# Patient Record
Sex: FEMALE | Race: WHITE | NOT HISPANIC OR LATINO | Employment: OTHER | URBAN - NONMETROPOLITAN AREA
[De-identification: names, ages, dates, MRNs, and addresses within clinical notes are randomized per-mention and may not be internally consistent; named-entity substitution may affect disease eponyms.]

---

## 2017-01-26 ENCOUNTER — APPOINTMENT (OUTPATIENT)
Dept: PREADMISSION TESTING | Facility: HOSPITAL | Age: 77
End: 2017-01-26

## 2017-01-26 VITALS
RESPIRATION RATE: 20 BRPM | HEIGHT: 66 IN | HEART RATE: 64 BPM | TEMPERATURE: 96.8 F | OXYGEN SATURATION: 97 % | WEIGHT: 158.38 LBS | SYSTOLIC BLOOD PRESSURE: 176 MMHG | BODY MASS INDEX: 25.45 KG/M2 | DIASTOLIC BLOOD PRESSURE: 81 MMHG

## 2017-01-26 LAB
ANION GAP SERPL CALCULATED.3IONS-SCNC: 10 MMOL/L (ref 4–13)
BUN BLD-MCNC: 35 MG/DL (ref 5–21)
BUN/CREAT SERPL: 23.6 (ref 7–25)
CALCIUM SPEC-SCNC: 8.9 MG/DL (ref 8.4–10.4)
CHLORIDE SERPL-SCNC: 105 MMOL/L (ref 98–110)
CO2 SERPL-SCNC: 31 MMOL/L (ref 24–31)
CREAT BLD-MCNC: 1.48 MG/DL (ref 0.5–1.4)
DEPRECATED RDW RBC AUTO: 43.5 FL (ref 40–54)
ERYTHROCYTE [DISTWIDTH] IN BLOOD BY AUTOMATED COUNT: 13.2 % (ref 12–15)
GFR SERPL CREATININE-BSD FRML MDRD: 34 ML/MIN/1.73
GLUCOSE BLD-MCNC: 97 MG/DL (ref 70–100)
HCT VFR BLD AUTO: 34.9 % (ref 37–47)
HGB BLD-MCNC: 11.5 G/DL (ref 12–16)
MCH RBC QN AUTO: 29.3 PG (ref 28–32)
MCHC RBC AUTO-ENTMCNC: 33 G/DL (ref 33–36)
MCV RBC AUTO: 88.8 FL (ref 82–98)
PLATELET # BLD AUTO: 164 10*3/MM3 (ref 130–400)
PMV BLD AUTO: 12.3 FL (ref 6–12)
POTASSIUM BLD-SCNC: 4 MMOL/L (ref 3.5–5.3)
RBC # BLD AUTO: 3.93 10*6/MM3 (ref 4.2–5.4)
SODIUM BLD-SCNC: 146 MMOL/L (ref 135–145)
WBC NRBC COR # BLD: 5.27 10*3/MM3 (ref 4.8–10.8)

## 2017-01-26 PROCEDURE — 36415 COLL VENOUS BLD VENIPUNCTURE: CPT

## 2017-01-26 PROCEDURE — 93010 ELECTROCARDIOGRAM REPORT: CPT | Performed by: INTERNAL MEDICINE

## 2017-01-26 PROCEDURE — 85027 COMPLETE CBC AUTOMATED: CPT

## 2017-01-26 PROCEDURE — 93005 ELECTROCARDIOGRAM TRACING: CPT

## 2017-01-26 PROCEDURE — 80048 BASIC METABOLIC PNL TOTAL CA: CPT

## 2017-01-26 RX ORDER — FEBUXOSTAT 40 MG/1
TABLET, FILM COATED ORAL
COMMUNITY
Start: 2016-11-22

## 2017-01-26 RX ORDER — LOSARTAN POTASSIUM 100 MG/1
TABLET ORAL
COMMUNITY
Start: 2016-09-14

## 2017-01-26 RX ORDER — DOXAZOSIN MESYLATE 1 MG/1
TABLET ORAL
COMMUNITY

## 2017-01-26 RX ORDER — AMLODIPINE BESYLATE 5 MG/1
TABLET ORAL
COMMUNITY
Start: 2016-06-06

## 2017-01-26 RX ORDER — ASPIRIN 81 MG/1
TABLET, CHEWABLE ORAL
COMMUNITY
Start: 2016-08-08

## 2017-01-26 RX ORDER — CALCITRIOL 0.25 UG/1
CAPSULE, LIQUID FILLED ORAL
COMMUNITY

## 2017-01-26 RX ORDER — METOPROLOL TARTRATE 100 MG/1
TABLET ORAL
COMMUNITY
Start: 2016-10-31

## 2017-01-26 RX ORDER — ESOMEPRAZOLE MAGNESIUM 40 MG/1
CAPSULE, DELAYED RELEASE ORAL
COMMUNITY
Start: 2016-09-14

## 2017-01-26 RX ORDER — ANTIARTHRITIC COMBINATION NO.2 900 MG
TABLET ORAL
COMMUNITY

## 2017-01-26 RX ORDER — LEVOTHYROXINE SODIUM 0.05 MG/1
TABLET ORAL
COMMUNITY

## 2017-01-26 RX ORDER — DORZOLAMIDE HYDROCHLORIDE AND TIMOLOL MALEATE 20; 5 MG/ML; MG/ML
SOLUTION/ DROPS OPHTHALMIC
COMMUNITY

## 2017-01-26 RX ORDER — ATORVASTATIN CALCIUM 20 MG/1
TABLET, FILM COATED ORAL
COMMUNITY
Start: 2016-11-14

## 2017-02-02 ENCOUNTER — HOSPITAL ENCOUNTER (OUTPATIENT)
Facility: HOSPITAL | Age: 77
Setting detail: HOSPITAL OUTPATIENT SURGERY
Discharge: HOME | End: 2017-02-02

## 2017-02-02 ENCOUNTER — ANESTHESIA (OUTPATIENT)
Dept: PERIOP | Facility: HOSPITAL | Age: 77
End: 2017-02-02

## 2017-02-02 ENCOUNTER — ANESTHESIA EVENT (OUTPATIENT)
Dept: PERIOP | Facility: HOSPITAL | Age: 77
End: 2017-02-02

## 2017-02-02 VITALS
TEMPERATURE: 98 F | HEART RATE: 71 BPM | SYSTOLIC BLOOD PRESSURE: 185 MMHG | RESPIRATION RATE: 18 BRPM | OXYGEN SATURATION: 97 % | DIASTOLIC BLOOD PRESSURE: 66 MMHG

## 2017-02-02 PROCEDURE — 25010000002 DEXAMETHASONE PER 1 MG

## 2017-02-02 PROCEDURE — 25010000002 FENTANYL CITRATE (PF) 100 MCG/2ML SOLUTION: Performed by: NURSE ANESTHETIST, CERTIFIED REGISTERED

## 2017-02-02 PROCEDURE — 25010000002 PROPOFOL 10 MG/ML EMULSION: Performed by: NURSE ANESTHETIST, CERTIFIED REGISTERED

## 2017-02-02 PROCEDURE — 25010000003 CEFAZOLIN PER 500 MG: Performed by: NURSE ANESTHETIST, CERTIFIED REGISTERED

## 2017-02-02 PROCEDURE — L8612 AQUEOUS SHUNT PROSTHESIS: HCPCS

## 2017-02-02 PROCEDURE — 25010000002 SUCCINYLCHOLINE PER 20 MG: Performed by: NURSE ANESTHETIST, CERTIFIED REGISTERED

## 2017-02-02 PROCEDURE — 25010000002 MIDAZOLAM PER 1 MG: Performed by: ANESTHESIOLOGY

## 2017-02-02 PROCEDURE — 25010000002 ONDANSETRON PER 1 MG: Performed by: NURSE ANESTHETIST, CERTIFIED REGISTERED

## 2017-02-02 PROCEDURE — 25010000002 ONDANSETRON PER 1 MG: Performed by: ANESTHESIOLOGY

## 2017-02-02 PROCEDURE — 25010000003 CEFAZOLIN PER 500 MG

## 2017-02-02 DEVICE — IMPLANTABLE DEVICE: Type: IMPLANTABLE DEVICE | Site: ANTERIOR CHAMBER | Status: FUNCTIONAL

## 2017-02-02 RX ORDER — SODIUM CHLORIDE, SODIUM LACTATE, POTASSIUM CHLORIDE, CALCIUM CHLORIDE 600; 310; 30; 20 MG/100ML; MG/100ML; MG/100ML; MG/100ML
100 INJECTION, SOLUTION INTRAVENOUS CONTINUOUS
Status: DISCONTINUED | OUTPATIENT
Start: 2017-02-02 | End: 2017-02-02 | Stop reason: HOSPADM

## 2017-02-02 RX ORDER — FENTANYL CITRATE 50 UG/ML
25 INJECTION, SOLUTION INTRAMUSCULAR; INTRAVENOUS AS NEEDED
Status: DISCONTINUED | OUTPATIENT
Start: 2017-02-02 | End: 2017-02-02 | Stop reason: HOSPADM

## 2017-02-02 RX ORDER — LABETALOL HYDROCHLORIDE 5 MG/ML
5 INJECTION, SOLUTION INTRAVENOUS
Status: DISCONTINUED | OUTPATIENT
Start: 2017-02-02 | End: 2017-02-02 | Stop reason: HOSPADM

## 2017-02-02 RX ORDER — SUCCINYLCHOLINE CHLORIDE 20 MG/ML
INJECTION INTRAMUSCULAR; INTRAVENOUS AS NEEDED
Status: DISCONTINUED | OUTPATIENT
Start: 2017-02-02 | End: 2017-02-02 | Stop reason: SURG

## 2017-02-02 RX ORDER — LIDOCAINE HYDROCHLORIDE 20 MG/ML
INJECTION, SOLUTION INFILTRATION; PERINEURAL AS NEEDED
Status: DISCONTINUED | OUTPATIENT
Start: 2017-02-02 | End: 2017-02-02 | Stop reason: SURG

## 2017-02-02 RX ORDER — MORPHINE SULFATE 2 MG/ML
2 INJECTION, SOLUTION INTRAMUSCULAR; INTRAVENOUS AS NEEDED
Status: DISCONTINUED | OUTPATIENT
Start: 2017-02-02 | End: 2017-02-02 | Stop reason: HOSPADM

## 2017-02-02 RX ORDER — HYDRALAZINE HYDROCHLORIDE 20 MG/ML
5 INJECTION INTRAMUSCULAR; INTRAVENOUS
Status: DISCONTINUED | OUTPATIENT
Start: 2017-02-02 | End: 2017-02-02 | Stop reason: HOSPADM

## 2017-02-02 RX ORDER — ROCURONIUM BROMIDE 10 MG/ML
INJECTION, SOLUTION INTRAVENOUS AS NEEDED
Status: DISCONTINUED | OUTPATIENT
Start: 2017-02-02 | End: 2017-02-02 | Stop reason: SURG

## 2017-02-02 RX ORDER — MIDAZOLAM HYDROCHLORIDE 1 MG/ML
2 INJECTION INTRAMUSCULAR; INTRAVENOUS
Status: DISCONTINUED | OUTPATIENT
Start: 2017-02-02 | End: 2017-02-02 | Stop reason: HOSPADM

## 2017-02-02 RX ORDER — SODIUM CHLORIDE 0.9 % (FLUSH) 0.9 %
1-10 SYRINGE (ML) INJECTION AS NEEDED
Status: DISCONTINUED | OUTPATIENT
Start: 2017-02-02 | End: 2017-02-02 | Stop reason: HOSPADM

## 2017-02-02 RX ORDER — MIDAZOLAM HYDROCHLORIDE 1 MG/ML
1 INJECTION INTRAMUSCULAR; INTRAVENOUS
Status: DISCONTINUED | OUTPATIENT
Start: 2017-02-02 | End: 2017-02-02 | Stop reason: HOSPADM

## 2017-02-02 RX ORDER — BUPIVACAINE HYDROCHLORIDE 7.5 MG/ML
INJECTION, SOLUTION EPIDURAL; RETROBULBAR AS NEEDED
Status: DISCONTINUED | OUTPATIENT
Start: 2017-02-02 | End: 2017-02-02 | Stop reason: HOSPADM

## 2017-02-02 RX ORDER — ONDANSETRON 2 MG/ML
INJECTION INTRAMUSCULAR; INTRAVENOUS AS NEEDED
Status: DISCONTINUED | OUTPATIENT
Start: 2017-02-02 | End: 2017-02-02 | Stop reason: SURG

## 2017-02-02 RX ORDER — FLUMAZENIL 0.1 MG/ML
0.2 INJECTION INTRAVENOUS AS NEEDED
Status: DISCONTINUED | OUTPATIENT
Start: 2017-02-02 | End: 2017-02-02 | Stop reason: HOSPADM

## 2017-02-02 RX ORDER — PILOCARPINE HYDROCHLORIDE 10 MG/ML
1 SOLUTION/ DROPS OPHTHALMIC
Status: COMPLETED | OUTPATIENT
Start: 2017-02-02 | End: 2017-02-02

## 2017-02-02 RX ORDER — IPRATROPIUM BROMIDE AND ALBUTEROL SULFATE 2.5; .5 MG/3ML; MG/3ML
3 SOLUTION RESPIRATORY (INHALATION) ONCE AS NEEDED
Status: DISCONTINUED | OUTPATIENT
Start: 2017-02-02 | End: 2017-02-02 | Stop reason: HOSPADM

## 2017-02-02 RX ORDER — OXYCODONE HYDROCHLORIDE AND ACETAMINOPHEN 5; 325 MG/1; MG/1
1 TABLET ORAL EVERY 4 HOURS PRN
Status: DISCONTINUED | OUTPATIENT
Start: 2017-02-02 | End: 2017-02-02 | Stop reason: HOSPADM

## 2017-02-02 RX ORDER — PILOCARPINE HYDROCHLORIDE 20 MG/ML
SOLUTION/ DROPS OPHTHALMIC AS NEEDED
Status: DISCONTINUED | OUTPATIENT
Start: 2017-02-02 | End: 2017-02-02 | Stop reason: HOSPADM

## 2017-02-02 RX ORDER — CEFAZOLIN SODIUM 1 G/3ML
INJECTION, POWDER, FOR SOLUTION INTRAMUSCULAR; INTRAVENOUS AS NEEDED
Status: DISCONTINUED | OUTPATIENT
Start: 2017-02-02 | End: 2017-02-02 | Stop reason: SURG

## 2017-02-02 RX ORDER — ONDANSETRON 2 MG/ML
4 INJECTION INTRAMUSCULAR; INTRAVENOUS AS NEEDED
Status: DISCONTINUED | OUTPATIENT
Start: 2017-02-02 | End: 2017-02-02 | Stop reason: HOSPADM

## 2017-02-02 RX ORDER — CELECOXIB 200 MG/1
200 CAPSULE ORAL ONCE AS NEEDED
Status: COMPLETED | OUTPATIENT
Start: 2017-02-02 | End: 2017-02-02

## 2017-02-02 RX ORDER — PROPOFOL 10 MG/ML
VIAL (ML) INTRAVENOUS AS NEEDED
Status: DISCONTINUED | OUTPATIENT
Start: 2017-02-02 | End: 2017-02-02 | Stop reason: SURG

## 2017-02-02 RX ORDER — DEXAMETHASONE SODIUM PHOSPHATE 4 MG/ML
INJECTION, SOLUTION INTRA-ARTICULAR; INTRALESIONAL; INTRAMUSCULAR; INTRAVENOUS; SOFT TISSUE AS NEEDED
Status: DISCONTINUED | OUTPATIENT
Start: 2017-02-02 | End: 2017-02-02 | Stop reason: HOSPADM

## 2017-02-02 RX ORDER — NALOXONE HCL 0.4 MG/ML
0.04 VIAL (ML) INJECTION AS NEEDED
Status: DISCONTINUED | OUTPATIENT
Start: 2017-02-02 | End: 2017-02-02 | Stop reason: HOSPADM

## 2017-02-02 RX ORDER — FENTANYL CITRATE 50 UG/ML
INJECTION, SOLUTION INTRAMUSCULAR; INTRAVENOUS AS NEEDED
Status: DISCONTINUED | OUTPATIENT
Start: 2017-02-02 | End: 2017-02-02 | Stop reason: SURG

## 2017-02-02 RX ORDER — MEPERIDINE HYDROCHLORIDE 25 MG/ML
12.5 INJECTION INTRAMUSCULAR; INTRAVENOUS; SUBCUTANEOUS
Status: DISCONTINUED | OUTPATIENT
Start: 2017-02-02 | End: 2017-02-02 | Stop reason: HOSPADM

## 2017-02-02 RX ORDER — PILOCARPINE HYDROCHLORIDE 10 MG/ML
1 SOLUTION/ DROPS OPHTHALMIC
Status: DISCONTINUED | OUTPATIENT
Start: 2017-02-02 | End: 2017-02-02 | Stop reason: HOSPADM

## 2017-02-02 RX ADMIN — ROCURONIUM BROMIDE 5 MG: 10 INJECTION INTRAVENOUS at 08:20

## 2017-02-02 RX ADMIN — CELECOXIB 200 MG: 200 CAPSULE ORAL at 12:02

## 2017-02-02 RX ADMIN — EPHEDRINE SULFATE 10 MG: 50 INJECTION INTRAMUSCULAR; INTRAVENOUS; SUBCUTANEOUS at 08:25

## 2017-02-02 RX ADMIN — PILOCARPINE HYDROCHLORIDE 1 DROP: 10 SOLUTION/ DROPS OPHTHALMIC at 07:32

## 2017-02-02 RX ADMIN — LIDOCAINE HYDROCHLORIDE 0.5 ML: 10 INJECTION, SOLUTION EPIDURAL; INFILTRATION; INTRACAUDAL; PERINEURAL at 07:13

## 2017-02-02 RX ADMIN — LIDOCAINE HYDROCHLORIDE 60 MG: 20 INJECTION, SOLUTION INFILTRATION; PERINEURAL at 08:20

## 2017-02-02 RX ADMIN — SODIUM CHLORIDE, POTASSIUM CHLORIDE, SODIUM LACTATE AND CALCIUM CHLORIDE 100 ML/HR: 600; 310; 30; 20 INJECTION, SOLUTION INTRAVENOUS at 07:13

## 2017-02-02 RX ADMIN — CEFAZOLIN 1 G: 1 INJECTION, POWDER, FOR SOLUTION INTRAVENOUS at 08:18

## 2017-02-02 RX ADMIN — ONDANSETRON HYDROCHLORIDE 4 MG: 2 SOLUTION INTRAMUSCULAR; INTRAVENOUS at 11:55

## 2017-02-02 RX ADMIN — SODIUM CHLORIDE, POTASSIUM CHLORIDE, SODIUM LACTATE AND CALCIUM CHLORIDE: 600; 310; 30; 20 INJECTION, SOLUTION INTRAVENOUS at 09:25

## 2017-02-02 RX ADMIN — SUCCINYLCHOLINE CHLORIDE 80 MG: 20 INJECTION, SOLUTION INTRAMUSCULAR; INTRAVENOUS at 08:20

## 2017-02-02 RX ADMIN — PILOCARPINE HYDROCHLORIDE 1 DROP: 10 SOLUTION/ DROPS OPHTHALMIC at 07:22

## 2017-02-02 RX ADMIN — PILOCARPINE HYDROCHLORIDE 1 DROP: 10 SOLUTION/ DROPS OPHTHALMIC at 07:27

## 2017-02-02 RX ADMIN — ONDANSETRON HYDROCHLORIDE 4 MG: 2 SOLUTION INTRAMUSCULAR; INTRAVENOUS at 09:35

## 2017-02-02 RX ADMIN — PROPOFOL 150 MG: 10 INJECTION, EMULSION INTRAVENOUS at 08:20

## 2017-02-02 RX ADMIN — MIDAZOLAM HYDROCHLORIDE 2 MG: 1 INJECTION, SOLUTION INTRAMUSCULAR; INTRAVENOUS at 07:13

## 2017-02-02 RX ADMIN — EPHEDRINE SULFATE 10 MG: 50 INJECTION INTRAMUSCULAR; INTRAVENOUS; SUBCUTANEOUS at 08:28

## 2017-02-02 RX ADMIN — FENTANYL CITRATE 100 MCG: 50 INJECTION, SOLUTION INTRAMUSCULAR; INTRAVENOUS at 08:20

## 2017-02-03 ENCOUNTER — OFFICE VISIT (OUTPATIENT)
Dept: FAMILY MEDICINE CLINIC | Age: 77
End: 2017-02-03
Payer: COMMERCIAL

## 2017-02-03 ENCOUNTER — HOSPITAL ENCOUNTER (OUTPATIENT)
Dept: GENERAL RADIOLOGY | Age: 77
Discharge: HOME | End: 2017-02-03
Payer: MEDICARE

## 2017-02-03 VITALS
TEMPERATURE: 98.5 F | DIASTOLIC BLOOD PRESSURE: 78 MMHG | HEART RATE: 72 BPM | SYSTOLIC BLOOD PRESSURE: 132 MMHG | HEIGHT: 68 IN | BODY MASS INDEX: 22.58 KG/M2 | RESPIRATION RATE: 16 BRPM | OXYGEN SATURATION: 98 % | WEIGHT: 149 LBS

## 2017-02-03 DIAGNOSIS — R13.10 DYSPHAGIA, UNSPECIFIED TYPE: ICD-10-CM

## 2017-02-03 DIAGNOSIS — R11.2 NAUSEA AND VOMITING, INTRACTABILITY OF VOMITING NOT SPECIFIED, UNSPECIFIED VOMITING TYPE: Primary | ICD-10-CM

## 2017-02-03 DIAGNOSIS — R11.2 NAUSEA AND VOMITING, INTRACTABILITY OF VOMITING NOT SPECIFIED, UNSPECIFIED VOMITING TYPE: ICD-10-CM

## 2017-02-03 DIAGNOSIS — M89.8X1 CHRONIC SCAPULAR PAIN: ICD-10-CM

## 2017-02-03 DIAGNOSIS — G89.29 CHRONIC SCAPULAR PAIN: ICD-10-CM

## 2017-02-03 LAB
ALBUMIN SERPL-MCNC: 4.4 G/DL (ref 3.5–5.2)
ALP BLD-CCNC: 74 U/L (ref 35–104)
ALT SERPL-CCNC: 16 U/L (ref 5–33)
AMYLASE: 170 U/L (ref 28–100)
ANION GAP SERPL CALCULATED.3IONS-SCNC: 17 MMOL/L (ref 7–19)
AST SERPL-CCNC: 22 U/L (ref 5–32)
BILIRUB SERPL-MCNC: 0.9 MG/DL (ref 0.2–1.2)
BUN BLDV-MCNC: 32 MG/DL (ref 8–23)
CALCIUM SERPL-MCNC: 9.6 MG/DL (ref 8.8–10.2)
CHLORIDE BLD-SCNC: 99 MMOL/L (ref 98–111)
CO2: 28 MMOL/L (ref 22–29)
CREAT SERPL-MCNC: 1.9 MG/DL (ref 0.5–0.9)
GFR NON-AFRICAN AMERICAN: 26
GLOBULIN: 3.3 G/DL
GLUCOSE BLD-MCNC: 97 MG/DL (ref 74–109)
HCT VFR BLD CALC: 38 % (ref 37–47)
HEMOGLOBIN: 12.7 G/DL (ref 12–16)
LIPASE: 37 U/L (ref 13–60)
MCH RBC QN AUTO: 30.1 PG (ref 27–31)
MCHC RBC AUTO-ENTMCNC: 33.4 G/DL (ref 33–37)
MCV RBC AUTO: 90 FL (ref 81–99)
PDW BLD-RTO: 13 % (ref 11.5–14.5)
PLATELET # BLD: 194 K/UL (ref 130–400)
PMV BLD AUTO: 12.8 FL (ref 7.4–10.4)
POTASSIUM SERPL-SCNC: 4.1 MMOL/L (ref 3.5–5)
RBC # BLD: 4.22 M/UL (ref 4.2–5.4)
SODIUM BLD-SCNC: 144 MMOL/L (ref 136–145)
TOTAL PROTEIN: 7.7 G/DL (ref 6.6–8.7)
WBC # BLD: 7.3 K/UL (ref 4.8–10.8)

## 2017-02-03 PROCEDURE — 99214 OFFICE O/P EST MOD 30 MIN: CPT | Performed by: FAMILY MEDICINE

## 2017-02-03 PROCEDURE — 76700 US EXAM ABDOM COMPLETE: CPT

## 2017-02-03 RX ORDER — ESOMEPRAZOLE MAGNESIUM 40 MG/1
40 CAPSULE, DELAYED RELEASE ORAL DAILY
Qty: 30 CAPSULE | Refills: 5 | Status: SHIPPED | OUTPATIENT
Start: 2017-02-03 | End: 2018-07-09 | Stop reason: ALTCHOICE

## 2017-02-03 RX ORDER — ONDANSETRON 4 MG/1
4 TABLET, ORALLY DISINTEGRATING ORAL EVERY 8 HOURS PRN
Qty: 30 TABLET | Refills: 1 | Status: SHIPPED | OUTPATIENT
Start: 2017-02-03 | End: 2017-12-06

## 2017-02-03 ASSESSMENT — ENCOUNTER SYMPTOMS
ALLERGIC/IMMUNOLOGIC NEGATIVE: 1
NAUSEA: 1
RESPIRATORY NEGATIVE: 1
VOMITING: 1

## 2017-02-07 DIAGNOSIS — N28.9 RENAL INSUFFICIENCY: ICD-10-CM

## 2017-02-07 DIAGNOSIS — E79.0 ELEVATED URIC ACID IN BLOOD: ICD-10-CM

## 2017-02-07 DIAGNOSIS — R74.8 ELEVATED AMYLASE: Primary | ICD-10-CM

## 2017-02-08 DIAGNOSIS — N28.9 RENAL INSUFFICIENCY: ICD-10-CM

## 2017-02-08 DIAGNOSIS — R74.8 ELEVATED AMYLASE: ICD-10-CM

## 2017-02-08 DIAGNOSIS — E79.0 ELEVATED URIC ACID IN BLOOD: ICD-10-CM

## 2017-02-08 LAB
ALBUMIN SERPL-MCNC: 4 G/DL (ref 3.5–5.2)
ALP BLD-CCNC: 63 U/L (ref 35–104)
ALT SERPL-CCNC: 18 U/L (ref 5–33)
AMYLASE: 124 U/L (ref 28–100)
ANION GAP SERPL CALCULATED.3IONS-SCNC: 13 MMOL/L (ref 7–19)
AST SERPL-CCNC: 21 U/L (ref 5–32)
BILIRUB SERPL-MCNC: 0.7 MG/DL (ref 0.2–1.2)
BUN BLDV-MCNC: 30 MG/DL (ref 8–23)
CALCIUM SERPL-MCNC: 8.7 MG/DL (ref 8.8–10.2)
CHLORIDE BLD-SCNC: 105 MMOL/L (ref 98–111)
CO2: 28 MMOL/L (ref 22–29)
CREAT SERPL-MCNC: 1.6 MG/DL (ref 0.5–0.9)
GFR NON-AFRICAN AMERICAN: 31
GLOBULIN: 2.8 G/DL
GLUCOSE BLD-MCNC: 88 MG/DL (ref 74–109)
POTASSIUM SERPL-SCNC: 4.8 MMOL/L (ref 3.5–5)
SODIUM BLD-SCNC: 146 MMOL/L (ref 136–145)
TOTAL PROTEIN: 6.8 G/DL (ref 6.6–8.7)
URIC ACID, SERUM: 6.1 MG/DL (ref 2.4–5.7)

## 2017-02-14 ENCOUNTER — OFFICE VISIT (OUTPATIENT)
Dept: GASTROENTEROLOGY | Age: 77
End: 2017-02-14
Payer: COMMERCIAL

## 2017-02-14 VITALS
DIASTOLIC BLOOD PRESSURE: 80 MMHG | WEIGHT: 157.8 LBS | OXYGEN SATURATION: 98 % | HEART RATE: 59 BPM | HEIGHT: 66 IN | SYSTOLIC BLOOD PRESSURE: 124 MMHG | BODY MASS INDEX: 25.36 KG/M2

## 2017-02-14 DIAGNOSIS — R11.2 NAUSEA AND VOMITING, INTRACTABILITY OF VOMITING NOT SPECIFIED, UNSPECIFIED VOMITING TYPE: Primary | ICD-10-CM

## 2017-02-14 DIAGNOSIS — R12 HEARTBURN: ICD-10-CM

## 2017-02-14 PROCEDURE — 99214 OFFICE O/P EST MOD 30 MIN: CPT | Performed by: NURSE PRACTITIONER

## 2017-02-14 ASSESSMENT — ENCOUNTER SYMPTOMS
ABDOMINAL PAIN: 0
VOICE CHANGE: 0
EYES NEGATIVE: 1
VOMITING: 1
CHEST TIGHTNESS: 0
BACK PAIN: 0
COUGH: 0
SHORTNESS OF BREATH: 0
NAUSEA: 1
SORE THROAT: 0
DIARRHEA: 0
RECTAL PAIN: 0
ALLERGIC/IMMUNOLOGIC NEGATIVE: 1
BLOOD IN STOOL: 0
CONSTIPATION: 0

## 2017-02-27 ENCOUNTER — ANESTHESIA EVENT (OUTPATIENT)
Dept: OPERATING ROOM | Age: 77
End: 2017-02-27

## 2017-03-07 ENCOUNTER — ANESTHESIA (OUTPATIENT)
Dept: OPERATING ROOM | Age: 77
End: 2017-03-07

## 2017-03-07 ENCOUNTER — HOSPITAL ENCOUNTER (OUTPATIENT)
Age: 77
Setting detail: SPECIMEN
Discharge: HOME | End: 2017-03-07
Payer: MEDICARE

## 2017-03-07 ENCOUNTER — SURGERY (OUTPATIENT)
Age: 77
End: 2017-03-07

## 2017-03-07 ENCOUNTER — HOSPITAL ENCOUNTER (OUTPATIENT)
Age: 77
Setting detail: OUTPATIENT SURGERY
Discharge: HOME | End: 2017-03-07
Attending: INTERNAL MEDICINE | Admitting: INTERNAL MEDICINE
Payer: COMMERCIAL

## 2017-03-07 VITALS
DIASTOLIC BLOOD PRESSURE: 54 MMHG | OXYGEN SATURATION: 100 % | HEART RATE: 60 BPM | HEIGHT: 66 IN | BODY MASS INDEX: 24.11 KG/M2 | TEMPERATURE: 98.4 F | RESPIRATION RATE: 16 BRPM | SYSTOLIC BLOOD PRESSURE: 138 MMHG | WEIGHT: 150 LBS

## 2017-03-07 VITALS — SYSTOLIC BLOOD PRESSURE: 114 MMHG | DIASTOLIC BLOOD PRESSURE: 54 MMHG | OXYGEN SATURATION: 96 %

## 2017-03-07 PROCEDURE — 43239 EGD BIOPSY SINGLE/MULTIPLE: CPT

## 2017-03-07 PROCEDURE — 88305 TISSUE EXAM BY PATHOLOGIST: CPT

## 2017-03-07 PROCEDURE — G8907 PT DOC NO EVENTS ON DISCHARG: HCPCS

## 2017-03-07 PROCEDURE — G8918 PT W/O PREOP ORDER IV AB PRO: HCPCS

## 2017-03-07 PROCEDURE — 43239 EGD BIOPSY SINGLE/MULTIPLE: CPT | Performed by: INTERNAL MEDICINE

## 2017-03-07 RX ORDER — SODIUM CHLORIDE, SODIUM LACTATE, POTASSIUM CHLORIDE, CALCIUM CHLORIDE 600; 310; 30; 20 MG/100ML; MG/100ML; MG/100ML; MG/100ML
INJECTION, SOLUTION INTRAVENOUS CONTINUOUS
Status: DISCONTINUED | OUTPATIENT
Start: 2017-03-07 | End: 2017-03-07 | Stop reason: HOSPADM

## 2017-03-07 RX ORDER — PROPOFOL 10 MG/ML
INJECTION, EMULSION INTRAVENOUS PRN
Status: DISCONTINUED | OUTPATIENT
Start: 2017-03-07 | End: 2017-03-07 | Stop reason: SDUPTHER

## 2017-03-07 RX ORDER — LIDOCAINE HYDROCHLORIDE 10 MG/ML
INJECTION, SOLUTION INFILTRATION; PERINEURAL PRN
Status: DISCONTINUED | OUTPATIENT
Start: 2017-03-07 | End: 2017-03-07 | Stop reason: SDUPTHER

## 2017-03-07 RX ORDER — LIDOCAINE HYDROCHLORIDE 10 MG/ML
1 INJECTION, SOLUTION EPIDURAL; INFILTRATION; INTRACAUDAL; PERINEURAL
Status: DISCONTINUED | OUTPATIENT
Start: 2017-03-07 | End: 2017-03-07 | Stop reason: HOSPADM

## 2017-03-07 RX ADMIN — SODIUM CHLORIDE, SODIUM LACTATE, POTASSIUM CHLORIDE, CALCIUM CHLORIDE: 600; 310; 30; 20 INJECTION, SOLUTION INTRAVENOUS at 10:01

## 2017-03-07 RX ADMIN — LIDOCAINE HYDROCHLORIDE 40 MG: 10 INJECTION, SOLUTION INFILTRATION; PERINEURAL at 10:08

## 2017-03-07 RX ADMIN — PROPOFOL 150 MG: 10 INJECTION, EMULSION INTRAVENOUS at 10:08

## 2017-03-07 ASSESSMENT — PAIN SCALES - GENERAL
PAINLEVEL_OUTOF10: 0
PAINLEVEL_OUTOF10: 0

## 2017-04-26 ENCOUNTER — OFFICE VISIT (OUTPATIENT)
Dept: GASTROENTEROLOGY | Age: 77
End: 2017-04-26
Payer: COMMERCIAL

## 2017-04-26 VITALS
SYSTOLIC BLOOD PRESSURE: 170 MMHG | OXYGEN SATURATION: 97 % | HEIGHT: 66 IN | WEIGHT: 155 LBS | BODY MASS INDEX: 24.91 KG/M2 | DIASTOLIC BLOOD PRESSURE: 72 MMHG | HEART RATE: 61 BPM

## 2017-04-26 DIAGNOSIS — K21.9 CHRONIC GERD: Primary | ICD-10-CM

## 2017-04-26 DIAGNOSIS — R11.2 NAUSEA AND VOMITING, INTRACTABILITY OF VOMITING NOT SPECIFIED, UNSPECIFIED VOMITING TYPE: ICD-10-CM

## 2017-04-26 PROCEDURE — 99213 OFFICE O/P EST LOW 20 MIN: CPT | Performed by: NURSE PRACTITIONER

## 2017-04-26 ASSESSMENT — ENCOUNTER SYMPTOMS
BACK PAIN: 0
EYES NEGATIVE: 1
SHORTNESS OF BREATH: 0
SORE THROAT: 0
VOICE CHANGE: 0
VOMITING: 1
COUGH: 0
ALLERGIC/IMMUNOLOGIC NEGATIVE: 1
RECTAL PAIN: 0
DIARRHEA: 0
ABDOMINAL PAIN: 0
NAUSEA: 1
BLOOD IN STOOL: 0
CONSTIPATION: 0
CHEST TIGHTNESS: 0

## 2017-05-12 RX ORDER — CHLORTHALIDONE 25 MG/1
TABLET ORAL
Qty: 30 TABLET | Refills: 5 | Status: SHIPPED | OUTPATIENT
Start: 2017-05-12 | End: 2019-01-14 | Stop reason: SDUPTHER

## 2017-05-24 ENCOUNTER — TELEPHONE (OUTPATIENT)
Dept: GASTROENTEROLOGY | Age: 77
End: 2017-05-24

## 2017-05-24 ENCOUNTER — TELEPHONE (OUTPATIENT)
Dept: FAMILY MEDICINE CLINIC | Age: 77
End: 2017-05-24

## 2017-06-29 ENCOUNTER — OFFICE VISIT (OUTPATIENT)
Dept: FAMILY MEDICINE CLINIC | Age: 77
End: 2017-06-29
Payer: COMMERCIAL

## 2017-06-29 VITALS
SYSTOLIC BLOOD PRESSURE: 134 MMHG | BODY MASS INDEX: 25.66 KG/M2 | WEIGHT: 159 LBS | OXYGEN SATURATION: 94 % | RESPIRATION RATE: 20 BRPM | HEART RATE: 63 BPM | DIASTOLIC BLOOD PRESSURE: 76 MMHG | TEMPERATURE: 98.3 F

## 2017-06-29 DIAGNOSIS — Z87.39 PERSONAL HISTORY OF RHEUMATOID ARTHRITIS: ICD-10-CM

## 2017-06-29 DIAGNOSIS — N18.3 CKD (CHRONIC KIDNEY DISEASE), STAGE 3 (MODERATE): ICD-10-CM

## 2017-06-29 DIAGNOSIS — M70.22 OLECRANON BURSITIS OF LEFT ELBOW: Primary | ICD-10-CM

## 2017-06-29 DIAGNOSIS — M70.22 OLECRANON BURSITIS OF LEFT ELBOW: ICD-10-CM

## 2017-06-29 DIAGNOSIS — D64.9 ANEMIA, UNSPECIFIED TYPE: Primary | ICD-10-CM

## 2017-06-29 LAB
ANION GAP SERPL CALCULATED.3IONS-SCNC: 14 MMOL/L (ref 7–19)
BASOPHILS ABSOLUTE: 0 K/UL (ref 0–0.2)
BASOPHILS RELATIVE PERCENT: 0.4 % (ref 0–1)
BUN BLDV-MCNC: 36 MG/DL (ref 8–23)
CALCIUM SERPL-MCNC: 9 MG/DL (ref 8.8–10.2)
CHLORIDE BLD-SCNC: 104 MMOL/L (ref 98–111)
CO2: 26 MMOL/L (ref 22–29)
CREAT SERPL-MCNC: 1.4 MG/DL (ref 0.5–0.9)
EOSINOPHILS ABSOLUTE: 0.2 K/UL (ref 0–0.6)
EOSINOPHILS RELATIVE PERCENT: 2.6 % (ref 0–5)
GFR NON-AFRICAN AMERICAN: 37
GLUCOSE BLD-MCNC: 118 MG/DL (ref 74–109)
HCT VFR BLD CALC: 33.5 % (ref 37–47)
HEMOGLOBIN: 10.9 G/DL (ref 12–16)
LYMPHOCYTES ABSOLUTE: 1.1 K/UL (ref 1.1–4.5)
LYMPHOCYTES RELATIVE PERCENT: 19.6 % (ref 20–40)
MCH RBC QN AUTO: 29.6 PG (ref 27–31)
MCHC RBC AUTO-ENTMCNC: 32.5 G/DL (ref 33–37)
MCV RBC AUTO: 91 FL (ref 81–99)
MONOCYTES ABSOLUTE: 0.5 K/UL (ref 0–0.9)
MONOCYTES RELATIVE PERCENT: 8.8 % (ref 0–10)
NEUTROPHILS ABSOLUTE: 3.9 K/UL (ref 1.5–7.5)
NEUTROPHILS RELATIVE PERCENT: 67.9 % (ref 50–65)
PDW BLD-RTO: 13.2 % (ref 11.5–14.5)
PLATELET # BLD: 173 K/UL (ref 130–400)
PMV BLD AUTO: 12.5 FL (ref 9.4–12.3)
POTASSIUM SERPL-SCNC: 4.4 MMOL/L (ref 3.5–5)
RBC # BLD: 3.68 M/UL (ref 4.2–5.4)
RHEUMATOID FACTOR: <10 IU/ML
SEDIMENTATION RATE, ERYTHROCYTE: 8 MM/HR (ref 0–25)
SODIUM BLD-SCNC: 144 MMOL/L (ref 136–145)
WBC # BLD: 5.7 K/UL (ref 4.8–10.8)

## 2017-06-29 PROCEDURE — 99213 OFFICE O/P EST LOW 20 MIN: CPT | Performed by: NURSE PRACTITIONER

## 2017-06-29 RX ORDER — CEPHALEXIN 500 MG/1
500 CAPSULE ORAL 3 TIMES DAILY
Qty: 30 CAPSULE | Refills: 0 | Status: SHIPPED | OUTPATIENT
Start: 2017-06-29 | End: 2017-08-07 | Stop reason: ALTCHOICE

## 2017-06-29 RX ORDER — METHYLPREDNISOLONE 4 MG/1
TABLET ORAL
Qty: 1 KIT | Refills: 0 | Status: SHIPPED | OUTPATIENT
Start: 2017-06-29 | End: 2017-07-05

## 2017-08-07 ENCOUNTER — OFFICE VISIT (OUTPATIENT)
Dept: FAMILY MEDICINE CLINIC | Age: 77
End: 2017-08-07
Payer: COMMERCIAL

## 2017-08-07 VITALS
SYSTOLIC BLOOD PRESSURE: 136 MMHG | WEIGHT: 160 LBS | DIASTOLIC BLOOD PRESSURE: 72 MMHG | OXYGEN SATURATION: 98 % | HEIGHT: 68 IN | HEART RATE: 69 BPM | TEMPERATURE: 97.9 F | RESPIRATION RATE: 16 BRPM | BODY MASS INDEX: 24.25 KG/M2

## 2017-08-07 DIAGNOSIS — F32.A DEPRESSION, UNSPECIFIED DEPRESSION TYPE: ICD-10-CM

## 2017-08-07 DIAGNOSIS — E03.9 HYPOTHYROIDISM, UNSPECIFIED TYPE: ICD-10-CM

## 2017-08-07 DIAGNOSIS — M70.22 OLECRANON BURSITIS OF LEFT ELBOW: ICD-10-CM

## 2017-08-07 DIAGNOSIS — Z23 NEED FOR VACCINATION WITH 13-POLYVALENT PNEUMOCOCCAL CONJUGATE VACCINE: ICD-10-CM

## 2017-08-07 DIAGNOSIS — E03.9 ACQUIRED HYPOTHYROIDISM: Primary | ICD-10-CM

## 2017-08-07 DIAGNOSIS — D64.9 ANEMIA, UNSPECIFIED TYPE: ICD-10-CM

## 2017-08-07 DIAGNOSIS — M70.22 OLECRANON BURSITIS OF LEFT ELBOW: Primary | ICD-10-CM

## 2017-08-07 LAB
ALBUMIN SERPL-MCNC: 3.8 G/DL (ref 3.5–5.2)
ALP BLD-CCNC: 73 U/L (ref 35–104)
ALT SERPL-CCNC: 15 U/L (ref 5–33)
ANION GAP SERPL CALCULATED.3IONS-SCNC: 15 MMOL/L (ref 7–19)
AST SERPL-CCNC: 19 U/L (ref 5–32)
BILIRUB SERPL-MCNC: 0.7 MG/DL (ref 0.2–1.2)
BUN BLDV-MCNC: 32 MG/DL (ref 8–23)
CALCIUM SERPL-MCNC: 9.6 MG/DL (ref 8.8–10.2)
CHLORIDE BLD-SCNC: 106 MMOL/L (ref 98–111)
CO2: 24 MMOL/L (ref 22–29)
CREAT SERPL-MCNC: 1.4 MG/DL (ref 0.5–0.9)
FERRITIN: 61.9 NG/ML (ref 13–150)
FOLATE: >20 NG/ML (ref 4.8–37.3)
GFR NON-AFRICAN AMERICAN: 37
GLUCOSE BLD-MCNC: 122 MG/DL (ref 74–109)
HCT VFR BLD CALC: 32.7 % (ref 37–47)
HEMOGLOBIN: 10.4 G/DL (ref 12–16)
IRON SATURATION: 26 % (ref 14–50)
IRON: 68 UG/DL (ref 37–145)
MCH RBC QN AUTO: 29.2 PG (ref 27–31)
MCHC RBC AUTO-ENTMCNC: 31.8 G/DL (ref 33–37)
MCV RBC AUTO: 91.9 FL (ref 81–99)
PDW BLD-RTO: 12.9 % (ref 11.5–14.5)
PLATELET # BLD: 151 K/UL (ref 130–400)
PMV BLD AUTO: 12.8 FL (ref 9.4–12.3)
POTASSIUM SERPL-SCNC: 4.3 MMOL/L (ref 3.5–5)
RBC # BLD: 3.56 M/UL (ref 4.2–5.4)
RETICULOCYTE ABSOLUTE COUNT: 0.07 M/UL (ref 0.03–0.12)
RETICULOCYTE COUNT PCT: 2.03 % (ref 0.5–1.5)
SODIUM BLD-SCNC: 145 MMOL/L (ref 136–145)
T4 FREE: 1.1 NG/ML (ref 0.9–1.7)
TOTAL IRON BINDING CAPACITY: 263 UG/DL (ref 250–400)
TOTAL PROTEIN: 7 G/DL (ref 6.6–8.7)
TSH SERPL DL<=0.05 MIU/L-ACNC: 6.67 UIU/ML (ref 0.27–4.2)
URIC ACID, SERUM: 5.2 MG/DL (ref 2.4–5.7)
VITAMIN B-12: 408 PG/ML (ref 211–946)
WBC # BLD: 5.1 K/UL (ref 4.8–10.8)

## 2017-08-07 PROCEDURE — 90670 PCV13 VACCINE IM: CPT | Performed by: FAMILY MEDICINE

## 2017-08-07 PROCEDURE — 99214 OFFICE O/P EST MOD 30 MIN: CPT | Performed by: FAMILY MEDICINE

## 2017-08-07 PROCEDURE — G0009 ADMIN PNEUMOCOCCAL VACCINE: HCPCS | Performed by: FAMILY MEDICINE

## 2017-08-07 RX ORDER — LEVOTHYROXINE SODIUM 0.05 MG/1
50 TABLET ORAL DAILY
Qty: 30 TABLET | Refills: 3 | Status: SHIPPED | OUTPATIENT
Start: 2017-08-07 | End: 2018-01-23 | Stop reason: SDUPTHER

## 2017-08-07 ASSESSMENT — ENCOUNTER SYMPTOMS
ALLERGIC/IMMUNOLOGIC NEGATIVE: 1
GASTROINTESTINAL NEGATIVE: 1
EYES NEGATIVE: 1
RESPIRATORY NEGATIVE: 1

## 2017-08-09 DIAGNOSIS — I10 ESSENTIAL HYPERTENSION: ICD-10-CM

## 2017-08-09 RX ORDER — AMLODIPINE BESYLATE 5 MG/1
TABLET ORAL
Qty: 90 TABLET | Refills: 3 | Status: SHIPPED | OUTPATIENT
Start: 2017-08-09 | End: 2018-08-21 | Stop reason: SDUPTHER

## 2017-08-14 ENCOUNTER — OFFICE VISIT (OUTPATIENT)
Dept: PSYCHIATRY | Age: 77
End: 2017-08-14
Payer: COMMERCIAL

## 2017-08-14 DIAGNOSIS — F43.21 ADJUSTMENT DISORDER WITH DEPRESSED MOOD: Primary | ICD-10-CM

## 2017-08-14 PROCEDURE — 90791 PSYCH DIAGNOSTIC EVALUATION: CPT | Performed by: SOCIAL WORKER

## 2017-09-06 ENCOUNTER — OFFICE VISIT (OUTPATIENT)
Dept: PSYCHIATRY | Age: 77
End: 2017-09-06
Payer: COMMERCIAL

## 2017-09-06 ENCOUNTER — OFFICE VISIT (OUTPATIENT)
Dept: FAMILY MEDICINE CLINIC | Age: 77
End: 2017-09-06
Payer: COMMERCIAL

## 2017-09-06 VITALS
TEMPERATURE: 98 F | OXYGEN SATURATION: 99 % | DIASTOLIC BLOOD PRESSURE: 70 MMHG | HEIGHT: 68 IN | HEART RATE: 61 BPM | SYSTOLIC BLOOD PRESSURE: 126 MMHG | WEIGHT: 157 LBS | RESPIRATION RATE: 18 BRPM | BODY MASS INDEX: 23.79 KG/M2

## 2017-09-06 DIAGNOSIS — F48.2 PSEUDOBULBAR AFFECT: ICD-10-CM

## 2017-09-06 DIAGNOSIS — N18.4 CKD (CHRONIC KIDNEY DISEASE), STAGE 4 (SEVERE): ICD-10-CM

## 2017-09-06 DIAGNOSIS — F43.21 ADJUSTMENT DISORDER WITH DEPRESSED MOOD: Primary | ICD-10-CM

## 2017-09-06 DIAGNOSIS — J40 BRONCHITIS: Primary | ICD-10-CM

## 2017-09-06 PROCEDURE — 90832 PSYTX W PT 30 MINUTES: CPT | Performed by: SOCIAL WORKER

## 2017-09-06 PROCEDURE — 96372 THER/PROPH/DIAG INJ SC/IM: CPT | Performed by: FAMILY MEDICINE

## 2017-09-06 PROCEDURE — 99214 OFFICE O/P EST MOD 30 MIN: CPT | Performed by: FAMILY MEDICINE

## 2017-09-06 RX ORDER — AMOXICILLIN 500 MG/1
500 CAPSULE ORAL 2 TIMES DAILY
Qty: 20 CAPSULE | Refills: 0 | Status: SHIPPED | OUTPATIENT
Start: 2017-09-06 | End: 2017-09-16

## 2017-09-06 RX ORDER — TRIAMCINOLONE ACETONIDE 40 MG/ML
40 INJECTION, SUSPENSION INTRA-ARTICULAR; INTRAMUSCULAR ONCE
Status: COMPLETED | OUTPATIENT
Start: 2017-09-06 | End: 2017-09-06

## 2017-09-06 RX ORDER — FEBUXOSTAT 40 MG/1
40 TABLET, FILM COATED ORAL DAILY
Qty: 90 TABLET | Refills: 3 | Status: SHIPPED | OUTPATIENT
Start: 2017-09-06 | End: 2018-10-16 | Stop reason: SDUPTHER

## 2017-09-06 RX ADMIN — TRIAMCINOLONE ACETONIDE 40 MG: 40 INJECTION, SUSPENSION INTRA-ARTICULAR; INTRAMUSCULAR at 11:32

## 2017-09-06 ASSESSMENT — ENCOUNTER SYMPTOMS
ALLERGIC/IMMUNOLOGIC NEGATIVE: 1
COUGH: 1
EYES NEGATIVE: 1
SHORTNESS OF BREATH: 1
GASTROINTESTINAL NEGATIVE: 1

## 2017-10-17 ENCOUNTER — OFFICE VISIT (OUTPATIENT)
Dept: FAMILY MEDICINE CLINIC | Age: 77
End: 2017-10-17
Payer: COMMERCIAL

## 2017-10-17 VITALS
HEIGHT: 68 IN | TEMPERATURE: 97.4 F | SYSTOLIC BLOOD PRESSURE: 130 MMHG | DIASTOLIC BLOOD PRESSURE: 78 MMHG | BODY MASS INDEX: 24.1 KG/M2 | WEIGHT: 159 LBS | RESPIRATION RATE: 16 BRPM | OXYGEN SATURATION: 98 % | HEART RATE: 65 BPM

## 2017-10-17 DIAGNOSIS — K52.9 ACUTE GASTROENTERITIS: ICD-10-CM

## 2017-10-17 DIAGNOSIS — F48.2 PSEUDOBULBAR AFFECT: Primary | ICD-10-CM

## 2017-10-17 LAB
ALBUMIN SERPL-MCNC: 3.8 G/DL (ref 3.5–5.2)
ALP BLD-CCNC: 64 U/L (ref 35–104)
ALT SERPL-CCNC: 23 U/L (ref 5–33)
ANION GAP SERPL CALCULATED.3IONS-SCNC: 14 MMOL/L (ref 7–19)
AST SERPL-CCNC: 23 U/L (ref 5–32)
BILIRUB SERPL-MCNC: 0.4 MG/DL (ref 0.2–1.2)
BUN BLDV-MCNC: 41 MG/DL (ref 8–23)
C DIFFICILE TOXIN, EIA: NORMAL
CALCIUM SERPL-MCNC: 8.8 MG/DL (ref 8.8–10.2)
CHLORIDE BLD-SCNC: 103 MMOL/L (ref 98–111)
CO2: 28 MMOL/L (ref 22–29)
CREAT SERPL-MCNC: 1.8 MG/DL (ref 0.5–0.9)
GFR NON-AFRICAN AMERICAN: 27
GLUCOSE BLD-MCNC: 89 MG/DL (ref 74–109)
HCT VFR BLD CALC: 34.5 % (ref 37–47)
HEMOGLOBIN: 11.1 G/DL (ref 12–16)
MCH RBC QN AUTO: 29.8 PG (ref 27–31)
MCHC RBC AUTO-ENTMCNC: 32.2 G/DL (ref 33–37)
MCV RBC AUTO: 92.5 FL (ref 81–99)
PDW BLD-RTO: 13.3 % (ref 11.5–14.5)
PLATELET # BLD: 167 K/UL (ref 130–400)
PMV BLD AUTO: 12.7 FL (ref 9.4–12.3)
POTASSIUM SERPL-SCNC: 4.7 MMOL/L (ref 3.5–5)
RBC # BLD: 3.73 M/UL (ref 4.2–5.4)
SODIUM BLD-SCNC: 145 MMOL/L (ref 136–145)
TOTAL PROTEIN: 6.8 G/DL (ref 6.6–8.7)
WBC # BLD: 4.7 K/UL (ref 4.8–10.8)

## 2017-10-17 PROCEDURE — 99214 OFFICE O/P EST MOD 30 MIN: CPT | Performed by: FAMILY MEDICINE

## 2017-10-17 RX ORDER — ONDANSETRON 4 MG/1
4 TABLET, FILM COATED ORAL DAILY PRN
Qty: 15 TABLET | Refills: 0 | Status: SHIPPED | OUTPATIENT
Start: 2017-10-17 | End: 2018-07-09 | Stop reason: ALTCHOICE

## 2017-10-17 ASSESSMENT — ENCOUNTER SYMPTOMS
DIARRHEA: 1
NAUSEA: 1
ALLERGIC/IMMUNOLOGIC NEGATIVE: 1
RESPIRATORY NEGATIVE: 1
VOMITING: 1
EYES NEGATIVE: 1

## 2017-10-17 NOTE — PROGRESS NOTES
EOM are normal. Pupils are equal, round, and reactive to light. Neck: Normal range of motion. Neck supple. Cardiovascular: Normal rate, regular rhythm, normal heart sounds and intact distal pulses. Pulmonary/Chest: Effort normal and breath sounds normal.   Abdominal: Soft. Bowel sounds are normal.   Musculoskeletal: Normal range of motion. Neurological: She is alert and oriented to person, place, and time. She has normal reflexes. Gait abnormal.   Mild left side weakness    Skin: Skin is warm and dry. Psychiatric: She has a normal mood and affect. Her behavior is normal. Judgment and thought content normal.   Vitals reviewed. Assessment:       1. Pseudobulbar affect - improving  dextromethorphan-quiNIDine (NUEDEXTA) 20-10 MG CAPS per capsule   2. Acute gastroenteritis  CBC    Comprehensive Metabolic Panel    Clostridium Difficile Toxin/Antigen    Culture Stool    ondansetron (ZOFRAN) 4 MG tablet           Plan: 1. Will increase Nuedexta to twice a day as recommended originally. Follow-up in one month. #2 we'll check for C. diff. We'll do blood work to assess fluid status. We'll use Zofran as needed for nausea. Encouraged fluids  Follow-up one month.

## 2017-10-19 DIAGNOSIS — N28.9 RENAL INSUFFICIENCY: Primary | ICD-10-CM

## 2017-10-20 DIAGNOSIS — N28.9 RENAL INSUFFICIENCY: ICD-10-CM

## 2017-10-20 LAB
ANION GAP SERPL CALCULATED.3IONS-SCNC: 12 MMOL/L (ref 7–19)
BUN BLDV-MCNC: 33 MG/DL (ref 8–23)
CALCIUM SERPL-MCNC: 8.8 MG/DL (ref 8.8–10.2)
CHLORIDE BLD-SCNC: 104 MMOL/L (ref 98–111)
CO2: 28 MMOL/L (ref 22–29)
CREAT SERPL-MCNC: 1.7 MG/DL (ref 0.5–0.9)
CULTURE, STOOL: NORMAL
E COLI SHIGA TOXIN ASSAY: NORMAL
GFR NON-AFRICAN AMERICAN: 29
GLUCOSE BLD-MCNC: 129 MG/DL (ref 74–109)
POTASSIUM SERPL-SCNC: 4.9 MMOL/L (ref 3.5–5)
SODIUM BLD-SCNC: 144 MMOL/L (ref 136–145)

## 2017-10-25 DIAGNOSIS — N28.9 RENAL INSUFFICIENCY: Primary | ICD-10-CM

## 2017-10-30 DIAGNOSIS — N28.9 RENAL INSUFFICIENCY: ICD-10-CM

## 2017-10-30 LAB
ANION GAP SERPL CALCULATED.3IONS-SCNC: 14 MMOL/L (ref 7–19)
BUN BLDV-MCNC: 43 MG/DL (ref 8–23)
CALCIUM SERPL-MCNC: 9.2 MG/DL (ref 8.8–10.2)
CHLORIDE BLD-SCNC: 102 MMOL/L (ref 98–111)
CO2: 27 MMOL/L (ref 22–29)
CREAT SERPL-MCNC: 1.6 MG/DL (ref 0.5–0.9)
GFR NON-AFRICAN AMERICAN: 31
GLUCOSE BLD-MCNC: 91 MG/DL (ref 74–109)
POTASSIUM SERPL-SCNC: 4.3 MMOL/L (ref 3.5–5)
SODIUM BLD-SCNC: 143 MMOL/L (ref 136–145)

## 2017-11-24 DIAGNOSIS — E78.5 HYPERLIPIDEMIA, UNSPECIFIED HYPERLIPIDEMIA TYPE: ICD-10-CM

## 2017-11-24 DIAGNOSIS — I10 ESSENTIAL HYPERTENSION: ICD-10-CM

## 2017-11-27 RX ORDER — ATORVASTATIN CALCIUM 20 MG/1
TABLET, FILM COATED ORAL
Qty: 90 TABLET | Refills: 3 | Status: SHIPPED | OUTPATIENT
Start: 2017-11-27 | End: 2019-01-15 | Stop reason: SDUPTHER

## 2017-11-27 RX ORDER — LOSARTAN POTASSIUM 100 MG/1
TABLET ORAL
Qty: 90 TABLET | Refills: 3 | Status: SHIPPED | OUTPATIENT
Start: 2017-11-27 | End: 2019-01-15 | Stop reason: SDUPTHER

## 2017-12-06 ENCOUNTER — OFFICE VISIT (OUTPATIENT)
Dept: FAMILY MEDICINE CLINIC | Age: 77
End: 2017-12-06
Payer: COMMERCIAL

## 2017-12-06 VITALS
WEIGHT: 160 LBS | HEART RATE: 90 BPM | HEIGHT: 68 IN | DIASTOLIC BLOOD PRESSURE: 88 MMHG | RESPIRATION RATE: 20 BRPM | TEMPERATURE: 100.3 F | OXYGEN SATURATION: 92 % | SYSTOLIC BLOOD PRESSURE: 138 MMHG | BODY MASS INDEX: 24.25 KG/M2

## 2017-12-06 DIAGNOSIS — R05.9 COUGH: ICD-10-CM

## 2017-12-06 DIAGNOSIS — J34.89 POSTERIOR RHINORRHEA: ICD-10-CM

## 2017-12-06 DIAGNOSIS — J18.9 PNEUMONIA SYMPTOMS: ICD-10-CM

## 2017-12-06 DIAGNOSIS — R50.9 FEVER, UNSPECIFIED FEVER CAUSE: ICD-10-CM

## 2017-12-06 DIAGNOSIS — B34.9 ACUTE VIRAL SYNDROME: Primary | ICD-10-CM

## 2017-12-06 DIAGNOSIS — R11.0 NAUSEA: ICD-10-CM

## 2017-12-06 LAB
INFLUENZA A ANTIBODY: NORMAL
INFLUENZA B ANTIBODY: NORMAL

## 2017-12-06 PROCEDURE — 87804 INFLUENZA ASSAY W/OPTIC: CPT | Performed by: FAMILY MEDICINE

## 2017-12-06 PROCEDURE — 99213 OFFICE O/P EST LOW 20 MIN: CPT | Performed by: FAMILY MEDICINE

## 2017-12-06 RX ORDER — BROMPHENIRAMINE MALEATE, PSEUDOEPHEDRINE HYDROCHLORIDE, AND DEXTROMETHORPHAN HYDROBROMIDE 2; 30; 10 MG/5ML; MG/5ML; MG/5ML
5 SYRUP ORAL 4 TIMES DAILY PRN
Qty: 200 ML | Refills: 0 | COMMUNITY
Start: 2017-12-06 | End: 2018-07-09 | Stop reason: ALTCHOICE

## 2017-12-06 ASSESSMENT — ENCOUNTER SYMPTOMS
ABDOMINAL PAIN: 0
DIARRHEA: 0
NAUSEA: 1
CONSTIPATION: 0
RHINORRHEA: 1
SHORTNESS OF BREATH: 0
SORE THROAT: 0
COUGH: 1
VOMITING: 0
EYE PAIN: 0
EYE DISCHARGE: 0
WHEEZING: 0
BACK PAIN: 0

## 2017-12-06 NOTE — PROGRESS NOTES
Rosa Tyler is a 78 y.o. female who presents today for   Chief Complaint   Patient presents with    Cough     has been going on for 2 to 3 days    Congestion    Fever    Headache     sore all over    Nausea       HPI   Patient reports that she has been having a cough and muscle aches/soreness that has been ongoing for the past couple of days and is reporting a fever and nausea and presents because she wants to make sure she doesn't have the flu. She denies taking any medicine to help her symptoms. She denies any aggravating or alleviating factors other than the Zofran she already has. She reports that she has not been using her Flonase. Review of Systems   Constitutional: Positive for fatigue and fever. Negative for activity change and appetite change. HENT: Positive for congestion and rhinorrhea. Negative for sore throat. Eyes: Negative for pain and discharge. Respiratory: Positive for cough. Negative for shortness of breath and wheezing. Cardiovascular: Negative for chest pain and palpitations. Gastrointestinal: Positive for nausea. Negative for abdominal pain, constipation, diarrhea and vomiting. Endocrine: Negative for cold intolerance and heat intolerance. Genitourinary: Negative for dysuria and hematuria. Musculoskeletal: Positive for myalgias. Negative for back pain, gait problem and neck pain. Skin: Negative for rash and wound. Neurological: Positive for weakness. Negative for syncope. Hematological: Negative for adenopathy. Does not bruise/bleed easily. Psychiatric/Behavioral: Negative for dysphoric mood and sleep disturbance. The patient is not nervous/anxious.         Past Medical History:   Diagnosis Date    Blindness 2014    left eye due to sroke in 2013- peter     Chronic GERD 4/26/2017    Chronic kidney disease     dr Mary Alice Coe pain     dr Juanito Lamb     Headache     Hypertension     Hypothyroidism     Osteoarthritis     BILATERAL FEET    Osteoporosis daily       No current facility-administered medications for this visit. Allergies   Allergen Reactions    Phenergan [Promethazine] Anxiety       Past Surgical History:   Procedure Laterality Date    BLADDER REPAIR  2011    bladder fell down. now feels like her Sphinctor muscle is very tight.  COLONOSCOPY  2005    CRANIAL LESION RESECTION  1981    blood clot    EYE SURGERY Left 02/02/2017    HIP SURGERY Right 2011    REPAIR, [address]    INNER EAR SURGERY Right 1968    EARDRUM REPAIR SURGERY, [facility] [address] [facility]    OTHER SURGICAL HISTORY      TUBES TIED AT AGE 38    [place] EGD TRANSORAL BIOPSY SINGLE/MULTIPLE N/A 3/7/2017    Dr CHEL Delgado-Gastritis   [facility] GASTROINTESTINAL ENDOSCOPY  03/07/2017       Social History   Substance Use Topics    Smoking status: Former Smoker     Quit date: 5/20/1960    Smokeless tobacco: Never Used    Alcohol use No       Family History   Problem Relation Age of Onset    Diabetes Mother     Stroke Mother     Diabetes Sister     Stroke Sister     Diabetes Brother     Stroke Brother     Colon Cancer Neg Hx     Colon Polyps Neg Hx     Esophageal Cancer Neg Hx     Liver Cancer Neg Hx     Liver Disease Neg Hx     Stomach Cancer Neg Hx     Rectal Cancer Neg Hx        /88 (Site: Right Arm, Position: Sitting, Cuff Size: Large Adult)   Pulse 90   Temp 100.3 °F (37.9 °C) (Temporal)   Resp 20   Ht 5' 8\" (1.727 m)   Wt 160 lb (72.6 kg)   SpO2 92%   BMI 24.33 kg/m²     Physical Exam   Constitutional: She is oriented to person, place, and time. She appears well-developed and well-nourished. Appears unwell. HENT:   Head: Normocephalic and atraumatic. Right Ear: Hearing, tympanic membrane, external ear and ear canal normal.   Left Ear: Hearing, tympanic membrane, external ear and ear canal normal.   Mouth/Throat: No oropharyngeal exudate.    Posterior drainage   Eyes: Conjunctivae are normal. Pupils are equal, prior meds, diet, and exercise plans as instructed. Patient agrees to follow up as instructed and sooner if needed. Patient agrees to go to ER if condition becomes emergent. Return if symptoms worsen or fail to improve, for next scheduled follow up with PCP.

## 2017-12-07 ENCOUNTER — HOSPITAL ENCOUNTER (OUTPATIENT)
Dept: GENERAL RADIOLOGY | Age: 77
Discharge: HOME | End: 2017-12-07
Payer: MEDICARE

## 2017-12-07 DIAGNOSIS — J18.9 PNEUMONIA SYMPTOMS: ICD-10-CM

## 2017-12-07 DIAGNOSIS — R05.9 COUGH: ICD-10-CM

## 2017-12-07 DIAGNOSIS — R50.9 FEVER, UNSPECIFIED FEVER CAUSE: ICD-10-CM

## 2017-12-07 PROCEDURE — 71020 XR CHEST STANDARD TWO VW: CPT

## 2017-12-13 ENCOUNTER — CARE COORDINATION (OUTPATIENT)
Dept: CARE COORDINATION | Age: 77
End: 2017-12-13

## 2017-12-13 ENCOUNTER — OFFICE VISIT (OUTPATIENT)
Dept: FAMILY MEDICINE CLINIC | Age: 77
End: 2017-12-13
Payer: COMMERCIAL

## 2017-12-13 VITALS
TEMPERATURE: 97.9 F | DIASTOLIC BLOOD PRESSURE: 82 MMHG | HEART RATE: 75 BPM | WEIGHT: 160 LBS | BODY MASS INDEX: 24.33 KG/M2 | OXYGEN SATURATION: 97 % | SYSTOLIC BLOOD PRESSURE: 122 MMHG

## 2017-12-13 DIAGNOSIS — J44.1 COPD WITH ACUTE EXACERBATION (HCC): Primary | ICD-10-CM

## 2017-12-13 DIAGNOSIS — J44.1 COPD WITH ACUTE EXACERBATION (HCC): ICD-10-CM

## 2017-12-13 LAB
ALBUMIN SERPL-MCNC: 3.9 G/DL (ref 3.5–5.2)
ALP BLD-CCNC: 59 U/L (ref 35–104)
ALT SERPL-CCNC: 18 U/L (ref 5–33)
ANION GAP SERPL CALCULATED.3IONS-SCNC: 14 MMOL/L (ref 7–19)
AST SERPL-CCNC: 22 U/L (ref 5–32)
BILIRUB SERPL-MCNC: 0.5 MG/DL (ref 0.2–1.2)
BUN BLDV-MCNC: 47 MG/DL (ref 8–23)
CALCIUM SERPL-MCNC: 8.6 MG/DL (ref 8.8–10.2)
CHLORIDE BLD-SCNC: 100 MMOL/L (ref 98–111)
CO2: 27 MMOL/L (ref 22–29)
CREAT SERPL-MCNC: 1.8 MG/DL (ref 0.5–0.9)
GFR NON-AFRICAN AMERICAN: 27
GLUCOSE BLD-MCNC: 167 MG/DL (ref 74–109)
HCT VFR BLD CALC: 34.7 % (ref 37–47)
HEMOGLOBIN: 11.1 G/DL (ref 12–16)
MCH RBC QN AUTO: 29.2 PG (ref 27–31)
MCHC RBC AUTO-ENTMCNC: 32 G/DL (ref 33–37)
MCV RBC AUTO: 91.3 FL (ref 81–99)
PDW BLD-RTO: 13 % (ref 11.5–14.5)
PLATELET # BLD: 109 K/UL (ref 130–400)
PMV BLD AUTO: 12.9 FL (ref 9.4–12.3)
POTASSIUM SERPL-SCNC: 4.3 MMOL/L (ref 3.5–5)
RBC # BLD: 3.8 M/UL (ref 4.2–5.4)
SODIUM BLD-SCNC: 141 MMOL/L (ref 136–145)
TOTAL PROTEIN: 6.8 G/DL (ref 6.6–8.7)
WBC # BLD: 4.3 K/UL (ref 4.8–10.8)

## 2017-12-13 PROCEDURE — 94640 AIRWAY INHALATION TREATMENT: CPT | Performed by: FAMILY MEDICINE

## 2017-12-13 PROCEDURE — 99214 OFFICE O/P EST MOD 30 MIN: CPT | Performed by: FAMILY MEDICINE

## 2017-12-13 PROCEDURE — 96372 THER/PROPH/DIAG INJ SC/IM: CPT | Performed by: FAMILY MEDICINE

## 2017-12-13 RX ORDER — ALBUTEROL SULFATE 2.5 MG/3ML
2.5 SOLUTION RESPIRATORY (INHALATION) EVERY 6 HOURS PRN
Qty: 120 EACH | Refills: 3 | Status: ON HOLD | OUTPATIENT
Start: 2017-12-13 | End: 2021-01-01 | Stop reason: HOSPADM

## 2017-12-13 RX ORDER — DEXAMETHASONE SODIUM PHOSPHATE 4 MG/ML
4 INJECTION, SOLUTION INTRA-ARTICULAR; INTRALESIONAL; INTRAMUSCULAR; INTRAVENOUS; SOFT TISSUE ONCE
Status: COMPLETED | OUTPATIENT
Start: 2017-12-13 | End: 2017-12-13

## 2017-12-13 RX ORDER — METHYLPREDNISOLONE 4 MG/1
TABLET ORAL
Qty: 1 KIT | Refills: 0 | Status: SHIPPED | OUTPATIENT
Start: 2017-12-13 | End: 2017-12-19

## 2017-12-13 RX ORDER — CEFDINIR 300 MG/1
300 CAPSULE ORAL 2 TIMES DAILY
Qty: 20 CAPSULE | Refills: 0 | Status: SHIPPED | OUTPATIENT
Start: 2017-12-13 | End: 2017-12-23

## 2017-12-13 RX ORDER — TRIAMCINOLONE ACETONIDE 40 MG/ML
40 INJECTION, SUSPENSION INTRA-ARTICULAR; INTRAMUSCULAR ONCE
Status: COMPLETED | OUTPATIENT
Start: 2017-12-13 | End: 2017-12-13

## 2017-12-13 RX ADMIN — TRIAMCINOLONE ACETONIDE 40 MG: 40 INJECTION, SUSPENSION INTRA-ARTICULAR; INTRAMUSCULAR at 15:12

## 2017-12-13 RX ADMIN — DEXAMETHASONE SODIUM PHOSPHATE 4 MG: 4 INJECTION, SOLUTION INTRA-ARTICULAR; INTRALESIONAL; INTRAMUSCULAR; INTRAVENOUS; SOFT TISSUE at 15:13

## 2017-12-13 ASSESSMENT — ENCOUNTER SYMPTOMS
COUGH: 1
EYES NEGATIVE: 1
NAUSEA: 1
VOMITING: 1
ALLERGIC/IMMUNOLOGIC NEGATIVE: 1
SHORTNESS OF BREATH: 1

## 2017-12-13 NOTE — PROGRESS NOTES
Subjective:      Patient ID: Dat Barrios is a 78 y.o. female. Chief Complaint   Patient presents with    Cough     congestion, has been hard to cough up anything    Anorexia     loss of appetite and vomiting what she does eat       Patient here for evaluation of a cough  Patient have a one week history of cough congestion and shortness of breath. She has some chills without fevers. She was seen by one of my partners recently. She was diagnosed with a viral illness and given decongestants. She still not improve she feel like she is getting worse. She can hear herself wheezing. She used to be a smoker. She never been officially diagnosed with COPD. Review of Systems   Constitutional: Positive for appetite change. HENT: Positive for congestion. Eyes: Negative. Respiratory: Positive for cough and shortness of breath. Cardiovascular: Negative. Gastrointestinal: Positive for nausea and vomiting. Endocrine: Negative. Genitourinary: Negative. Musculoskeletal: Negative. Skin: Negative. Allergic/Immunologic: Negative. Neurological: Negative. Hematological: Negative. Psychiatric/Behavioral: Negative. Objective:   Physical Exam   Constitutional: She is oriented to person, place, and time. She appears well-developed and well-nourished. HENT:   Head: Normocephalic and atraumatic. Right Ear: External ear normal.   Left Ear: External ear normal.   Nose: Nose normal.   Mouth/Throat: Oropharynx is clear and moist.   Eyes: Conjunctivae and EOM are normal. Pupils are equal, round, and reactive to light. Neck: Normal range of motion. Neck supple. Cardiovascular: Normal rate, regular rhythm, normal heart sounds and intact distal pulses. Pulmonary/Chest: Effort normal and breath sounds normal.   Abdominal: Soft. Bowel sounds are normal.   Musculoskeletal: Normal range of motion. Neurological: She is alert and oriented to person, place, and time. She has normal reflexes. Gait abnormal.   Mild left side weakness    Skin: Skin is warm and dry. Psychiatric: She has a normal mood and affect. Her behavior is normal. Judgment and thought content normal.   Vitals reviewed. Assessment:       1. COPD with acute exacerbation (HCC)  GA AEROSOL INHALATION TREATMENT    Nebulizers (AIRIAL COMPACT MINI NEBULIZER) MISC    albuterol (PROVENTIL) (2.5 MG/3ML) 0.083% nebulizer solution    triamcinolone acetonide (KENALOG-40) injection 40 mg    dexamethasone (DECADRON) injection 4 mg    cefTRIAXone (ROCEPHIN) 1,000 mg in lidocaine 1 % 2.86 mL IM Injection    methylPREDNISolone (MEDROL DOSEPACK) 4 MG tablet    cefdinir (OMNICEF) 300 MG capsule    CBC    Comprehensive Metabolic Panel             Plan:    1. Chest x-ray was done recently that was normal limits we will do some blood work. Steroids and antibiotics and albuterol. Follow-up next week.

## 2017-12-20 ENCOUNTER — OFFICE VISIT (OUTPATIENT)
Dept: FAMILY MEDICINE CLINIC | Age: 77
End: 2017-12-20
Payer: COMMERCIAL

## 2017-12-20 VITALS
SYSTOLIC BLOOD PRESSURE: 136 MMHG | BODY MASS INDEX: 23.64 KG/M2 | TEMPERATURE: 97.9 F | OXYGEN SATURATION: 97 % | DIASTOLIC BLOOD PRESSURE: 74 MMHG | WEIGHT: 155.5 LBS | HEART RATE: 69 BPM

## 2017-12-20 DIAGNOSIS — J40 BRONCHITIS: Primary | ICD-10-CM

## 2017-12-20 PROCEDURE — 99213 OFFICE O/P EST LOW 20 MIN: CPT | Performed by: FAMILY MEDICINE

## 2017-12-20 ASSESSMENT — ENCOUNTER SYMPTOMS
ALLERGIC/IMMUNOLOGIC NEGATIVE: 1
COUGH: 1
GASTROINTESTINAL NEGATIVE: 1
EYES NEGATIVE: 1

## 2017-12-20 NOTE — PROGRESS NOTES
Subjective:      Patient ID: Kina Martínez is a 78 y.o. female. Chief Complaint   Patient presents with    Follow-up     one week follow up, has gotten a little better but not completely       Patient here for follow-up of bronchitis. She was seen last week. At that time she was having cough congestion shortness of air. She was getting worse. She was started on antibiotics and steroids. She was also started inhaler. Medication have been effective. She feel much better. She still has some cough but is much improved. She said that some other medication is causing her to have indigestion. She should be out of her medication by tomorrow. Denies any other new symptoms. Review of Systems   Constitutional: Negative. HENT: Negative. Eyes: Negative. Respiratory: Positive for cough. Cardiovascular: Negative. Gastrointestinal: Negative. Endocrine: Negative. Genitourinary: Negative. Musculoskeletal: Negative. Skin: Negative. Allergic/Immunologic: Negative. Neurological: Negative. Hematological: Negative. Psychiatric/Behavioral: Negative. Objective:   Physical Exam   Constitutional: She is oriented to person, place, and time. She appears well-developed and well-nourished. HENT:   Head: Normocephalic and atraumatic. Right Ear: External ear normal.   Left Ear: External ear normal.   Nose: Nose normal.   Mouth/Throat: Oropharynx is clear and moist.   Eyes: Conjunctivae and EOM are normal. Pupils are equal, round, and reactive to light. Neck: Normal range of motion. Neck supple. Cardiovascular: Normal rate, regular rhythm, normal heart sounds and intact distal pulses. Pulmonary/Chest: Effort normal and breath sounds normal.   Abdominal: Soft. Bowel sounds are normal.   Musculoskeletal: Normal range of motion. Neurological: She is alert and oriented to person, place, and time. She has normal reflexes.  Gait abnormal.   Mild left side weakness    Skin: Skin is warm

## 2018-01-23 DIAGNOSIS — E03.9 ACQUIRED HYPOTHYROIDISM: ICD-10-CM

## 2018-01-23 RX ORDER — LEVOTHYROXINE SODIUM 0.05 MG/1
TABLET ORAL
Qty: 30 TABLET | Refills: 5 | Status: SHIPPED | OUTPATIENT
Start: 2018-01-23 | End: 2019-01-15 | Stop reason: SDUPTHER

## 2018-02-05 RX ORDER — METOPROLOL TARTRATE 100 MG/1
TABLET ORAL
Qty: 180 TABLET | Refills: 3 | Status: SHIPPED | OUTPATIENT
Start: 2018-02-05 | End: 2019-01-15 | Stop reason: SDUPTHER

## 2018-02-22 RX ORDER — OMEPRAZOLE 40 MG/1
CAPSULE, DELAYED RELEASE ORAL
Qty: 30 CAPSULE | Refills: 5 | Status: SHIPPED | OUTPATIENT
Start: 2018-02-22 | End: 2019-01-14 | Stop reason: SDUPTHER

## 2018-04-09 ENCOUNTER — TELEPHONE (OUTPATIENT)
Dept: FAMILY MEDICINE CLINIC | Age: 78
End: 2018-04-09

## 2018-04-09 ENCOUNTER — NURSE ONLY (OUTPATIENT)
Dept: FAMILY MEDICINE CLINIC | Age: 78
End: 2018-04-09
Payer: COMMERCIAL

## 2018-04-09 DIAGNOSIS — M15.9 PRIMARY OSTEOARTHRITIS INVOLVING MULTIPLE JOINTS: Primary | ICD-10-CM

## 2018-04-09 PROCEDURE — 96372 THER/PROPH/DIAG INJ SC/IM: CPT | Performed by: FAMILY MEDICINE

## 2018-04-09 RX ORDER — TRIAMCINOLONE ACETONIDE 40 MG/ML
40 INJECTION, SUSPENSION INTRA-ARTICULAR; INTRAMUSCULAR ONCE
Status: COMPLETED | OUTPATIENT
Start: 2018-04-09 | End: 2018-04-09

## 2018-04-09 RX ORDER — DEXAMETHASONE SODIUM PHOSPHATE 10 MG/ML
4 INJECTION INTRAMUSCULAR; INTRAVENOUS ONCE
Status: COMPLETED | OUTPATIENT
Start: 2018-04-09 | End: 2018-04-09

## 2018-04-09 RX ADMIN — TRIAMCINOLONE ACETONIDE 40 MG: 40 INJECTION, SUSPENSION INTRA-ARTICULAR; INTRAMUSCULAR at 14:02

## 2018-04-09 RX ADMIN — DEXAMETHASONE SODIUM PHOSPHATE 4 MG: 10 INJECTION INTRAMUSCULAR; INTRAVENOUS at 14:02

## 2018-06-05 ENCOUNTER — OFFICE VISIT (OUTPATIENT)
Dept: FAMILY MEDICINE CLINIC | Age: 78
End: 2018-06-05
Payer: COMMERCIAL

## 2018-06-05 ENCOUNTER — HOSPITAL ENCOUNTER (OUTPATIENT)
Dept: GENERAL RADIOLOGY | Age: 78
Discharge: HOME | End: 2018-06-05
Payer: MEDICARE

## 2018-06-05 VITALS
TEMPERATURE: 98.4 F | HEIGHT: 68 IN | SYSTOLIC BLOOD PRESSURE: 148 MMHG | OXYGEN SATURATION: 98 % | HEART RATE: 66 BPM | DIASTOLIC BLOOD PRESSURE: 70 MMHG | RESPIRATION RATE: 16 BRPM | BODY MASS INDEX: 23.95 KG/M2 | WEIGHT: 158 LBS

## 2018-06-05 DIAGNOSIS — M25.551 PAIN OF RIGHT HIP JOINT: ICD-10-CM
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
## 2018-06-05 PROCEDURE — 73502 X-RAY EXAM HIP UNI 2-3 VIEWS: CPT

## 2018-06-05 PROCEDURE — 99214 OFFICE O/P EST MOD 30 MIN: CPT | Performed by: FAMILY MEDICINE

## 2018-06-05 ASSESSMENT — ENCOUNTER SYMPTOMS
GASTROINTESTINAL NEGATIVE: 1
RESPIRATORY NEGATIVE: 1
EYES NEGATIVE: 1
ALLERGIC/IMMUNOLOGIC NEGATIVE: 1

## 2018-06-05 ASSESSMENT — PATIENT HEALTH QUESTIONNAIRE - PHQ9
1. LITTLE INTEREST OR PLEASURE IN DOING THINGS: 0
SUM OF ALL RESPONSES TO PHQ QUESTIONS 1-9: 0
2. FEELING DOWN, DEPRESSED OR HOPELESS: 0
SUM OF ALL RESPONSES TO PHQ9 QUESTIONS 1 & 2: 0

## 2018-06-14 ENCOUNTER — HOSPITAL ENCOUNTER (OUTPATIENT)
Dept: GENERAL RADIOLOGY | Age: 78
Discharge: HOME | End: 2018-06-14
Payer: MEDICARE

## 2018-06-14 ENCOUNTER — OFFICE VISIT (OUTPATIENT)
Dept: FAMILY MEDICINE CLINIC | Age: 78
End: 2018-06-14
Payer: COMMERCIAL

## 2018-06-14 VITALS
OXYGEN SATURATION: 96 % | WEIGHT: 153 LBS | SYSTOLIC BLOOD PRESSURE: 116 MMHG | TEMPERATURE: 98.3 F | BODY MASS INDEX: 23.26 KG/M2 | DIASTOLIC BLOOD PRESSURE: 70 MMHG | RESPIRATION RATE: 20 BRPM | HEART RATE: 70 BPM

## 2018-06-14 DIAGNOSIS — J20.9 ACUTE BRONCHITIS, UNSPECIFIED ORGANISM: Primary | ICD-10-CM

## 2018-06-14 DIAGNOSIS — J20.9 ACUTE BRONCHITIS, UNSPECIFIED ORGANISM: ICD-10-CM

## 2018-06-14 DIAGNOSIS — Z91.81 AT HIGH RISK FOR FALLS: ICD-10-CM

## 2018-06-14 PROCEDURE — 99214 OFFICE O/P EST MOD 30 MIN: CPT | Performed by: NURSE PRACTITIONER

## 2018-06-14 PROCEDURE — 71046 X-RAY EXAM CHEST 2 VIEWS: CPT

## 2018-06-14 RX ORDER — METHYLPREDNISOLONE 4 MG/1
TABLET ORAL
Qty: 1 KIT | Refills: 0 | Status: SHIPPED | OUTPATIENT
Start: 2018-06-14 | End: 2018-06-20

## 2018-06-14 RX ORDER — CEFDINIR 300 MG/1
300 CAPSULE ORAL 2 TIMES DAILY
Qty: 20 CAPSULE | Refills: 0 | Status: SHIPPED | OUTPATIENT
Start: 2018-06-14 | End: 2018-06-24

## 2018-06-14 ASSESSMENT — ENCOUNTER SYMPTOMS
SHORTNESS OF BREATH: 1
WHEEZING: 1
COUGH: 1

## 2018-07-09 ENCOUNTER — OFFICE VISIT (OUTPATIENT)
Dept: FAMILY MEDICINE CLINIC | Age: 78
End: 2018-07-09
Payer: COMMERCIAL

## 2018-07-09 VITALS
WEIGHT: 158 LBS | TEMPERATURE: 97.6 F | SYSTOLIC BLOOD PRESSURE: 120 MMHG | OXYGEN SATURATION: 96 % | BODY MASS INDEX: 24.02 KG/M2 | DIASTOLIC BLOOD PRESSURE: 76 MMHG | HEART RATE: 78 BPM

## 2018-07-09 DIAGNOSIS — D50.9 IRON DEFICIENCY ANEMIA, UNSPECIFIED IRON DEFICIENCY ANEMIA TYPE: ICD-10-CM

## 2018-07-09 DIAGNOSIS — R05.9 COUGH: ICD-10-CM

## 2018-07-09 DIAGNOSIS — M79.605 LEFT LEG PAIN: Primary | ICD-10-CM

## 2018-07-09 LAB
ALBUMIN SERPL-MCNC: 4 G/DL (ref 3.5–5.2)
ALP BLD-CCNC: 68 U/L (ref 35–104)
ALT SERPL-CCNC: 16 U/L (ref 5–33)
ANION GAP SERPL CALCULATED.3IONS-SCNC: 15 MMOL/L (ref 7–19)
AST SERPL-CCNC: 21 U/L (ref 5–32)
BILIRUB SERPL-MCNC: 0.6 MG/DL (ref 0.2–1.2)
BUN BLDV-MCNC: 45 MG/DL (ref 8–23)
CALCIUM SERPL-MCNC: 9.1 MG/DL (ref 8.8–10.2)
CHLORIDE BLD-SCNC: 104 MMOL/L (ref 98–111)
CO2: 25 MMOL/L (ref 22–29)
CREAT SERPL-MCNC: 2 MG/DL (ref 0.5–0.9)
GFR NON-AFRICAN AMERICAN: 24
GLUCOSE BLD-MCNC: 146 MG/DL (ref 74–109)
HCT VFR BLD CALC: 32.8 % (ref 37–47)
HEMOGLOBIN: 10.6 G/DL (ref 12–16)
IRON SATURATION: 25 % (ref 14–50)
IRON: 67 UG/DL (ref 37–145)
MCH RBC QN AUTO: 29.4 PG (ref 27–31)
MCHC RBC AUTO-ENTMCNC: 32.3 G/DL (ref 33–37)
MCV RBC AUTO: 91.1 FL (ref 81–99)
PDW BLD-RTO: 13.3 % (ref 11.5–14.5)
PLATELET # BLD: 166 K/UL (ref 130–400)
PMV BLD AUTO: 12.3 FL (ref 9.4–12.3)
POTASSIUM SERPL-SCNC: 3.9 MMOL/L (ref 3.5–5)
RBC # BLD: 3.6 M/UL (ref 4.2–5.4)
SODIUM BLD-SCNC: 144 MMOL/L (ref 136–145)
TOTAL IRON BINDING CAPACITY: 271 UG/DL (ref 250–400)
TOTAL PROTEIN: 6.8 G/DL (ref 6.6–8.7)
WBC # BLD: 5.7 K/UL (ref 4.8–10.8)

## 2018-07-09 PROCEDURE — 99214 OFFICE O/P EST MOD 30 MIN: CPT | Performed by: FAMILY MEDICINE

## 2018-07-09 ASSESSMENT — ENCOUNTER SYMPTOMS
COUGH: 1
GASTROINTESTINAL NEGATIVE: 1
ALLERGIC/IMMUNOLOGIC NEGATIVE: 1
EYES NEGATIVE: 1

## 2018-07-09 NOTE — PROGRESS NOTES
SUBJECTIVE:    Patient ID: Lyndsey Guy is a 78 y.o. female. HPI:   Patient here for follow-up of multiple medical problems  Left leg pain  Patient complaining of bilateral leg pain. Pain worse with increased activity. No pain in the knee or hip. She have problems with her feet. She walks trying to protect his feet. She also have chronic low back pain. Pain is mild to moderate. She been having this problem for the last couple of months since to be getting worse not recent past.  Iron deficiency  Patient have iron deficiency. She takes iron supplementation therapy. She wonder she she need to continue to do that. She denies any black stools or bloody stools. Cough  Patient history of smoking. She had no fever no chills. She have a recurrent cough. Albuterol help with the problem. She have a chest x-ray done 2 weeks ago. Her cough improved while on steroids. Doesn't happen very often. She wonders until she should be doing.          Past Medical History:   Diagnosis Date    Blindness 2014    left eye due to sroke in 2013- peter     Chronic GERD 4/26/2017    Chronic kidney disease     dr Kale Aguilera pain     dr Michelle Bailey Headache     Hypertension     Hypothyroidism     Osteoarthritis     BILATERAL FEET    Osteoporosis     refuse treatment     Stroke [facility]) 2013    eventually lost left eye sight due to this- dr Holly Luque Stroke [facility])       Current Outpatient Prescriptions   Medication Sig Dispense Refill    B Complex-C (SUPER B COMPLEX PO) Take by mouth      CALCIUM PO Take by mouth      omeprazole (PRILOSEC) 40 MG delayed release capsule TAKE ONE CAPSULE BY MOUTH DAILY -SWALLOW WHOLE. DO NOT CHEW OR CRUSH. 30 capsule 5    metoprolol (LOPRESSOR) 100 MG tablet TAKE 1 TABLET TWICE A  tablet 3    levothyroxine (SYNTHROID) 50 MCG tablet TAKE ONE TABLET BY MOUTH DAILY 30 tablet 5    Nebulizers (AIRIAL COMPACT MINI NEBULIZER) MISC As directed 1 each 0    albuterol (PROVENTIL) (2.5 MG/3ML) 0.083% nebulizer solution Take 3 mLs by nebulization every 6 hours as needed for Wheezing 120 each 3    losartan (COZAAR) 100 MG tablet TAKE 1 TABLET DAILY 90 tablet 3    atorvastatin (LIPITOR) 20 MG tablet TAKE 1 TABLET NIGHTLY 90 tablet 3    febuxostat (ULORIC) 40 MG TABS tablet Take 1 tablet by mouth daily 90 tablet 3    amLODIPine (NORVASC) 5 MG tablet TAKE 1 TABLET DAILY 90 tablet 3    Misc Natural Products (GLUCOSAMINE CHOND COMPLEX/MSM PO) Take by mouth      chlorthalidone (HYGROTON) 25 MG tablet TAKE ONE TABLET BY MOUTH DAILY 30 tablet 5    aspirin 81 MG chewable tablet Take 1 tablet by mouth daily 30 tablet 0    Multiple Vitamins-Minerals (WOMENS 50+ MULTI VITAMIN/MIN PO) Take by mouth      Probiotic Product (PROBIOTIC DAILY PO) Take by mouth      Biotin 5000 MCG TABS Take by mouth daily       No current facility-administered medications for this visit. Allergies   Allergen Reactions    Phenergan [Promethazine] Anxiety       Review of Systems   Constitutional: Negative. HENT: Negative. Eyes: Negative. Respiratory: Positive for cough. Cardiovascular: Negative. Gastrointestinal: Negative. Endocrine: Negative. Genitourinary: Negative. Musculoskeletal: Positive for myalgias. Skin: Negative. Allergic/Immunologic: Negative. Neurological: Negative. Hematological: Negative. Psychiatric/Behavioral: Negative. OBJECTIVE:    Physical Exam   Constitutional: She is oriented to person, place, and time. She appears well-developed and well-nourished. HENT:   Head: Normocephalic and atraumatic. Right Ear: External ear normal.   Left Ear: External ear normal.   Nose: Nose normal.   Mouth/Throat: Oropharynx is clear and moist.   Eyes: Conjunctivae and EOM are normal. Pupils are equal, round, and reactive to light. Neck: Normal range of motion. Neck supple. Cardiovascular: Normal rate, regular rhythm, normal heart sounds and intact distal pulses.

## 2018-07-17 LAB
ANION GAP SERPL CALCULATED.3IONS-SCNC: 17 MMOL/L (ref 7–19)
BASOPHILS ABSOLUTE: 0 K/UL (ref 0–0.2)
BASOPHILS RELATIVE PERCENT: 0.4 % (ref 0–1)
BUN BLDV-MCNC: 52 MG/DL (ref 8–23)
CALCIUM SERPL-MCNC: 9.4 MG/DL (ref 8.8–10.2)
CHLORIDE BLD-SCNC: 103 MMOL/L (ref 98–111)
CO2: 25 MMOL/L (ref 22–29)
CREAT SERPL-MCNC: 1.6 MG/DL (ref 0.5–0.9)
CREATININE URINE: 92.6 MG/DL (ref 4.2–622)
EOSINOPHILS ABSOLUTE: 0.1 K/UL (ref 0–0.6)
EOSINOPHILS RELATIVE PERCENT: 2.1 % (ref 0–5)
FERRITIN: 77.2 NG/ML (ref 13–150)
GFR NON-AFRICAN AMERICAN: 31
GLUCOSE BLD-MCNC: 96 MG/DL (ref 74–109)
HCT VFR BLD CALC: 32.9 % (ref 37–47)
HEMOGLOBIN: 10.5 G/DL (ref 12–16)
IRON SATURATION: 23 % (ref 14–50)
IRON: 65 UG/DL (ref 37–145)
LYMPHOCYTES ABSOLUTE: 1.1 K/UL (ref 1.1–4.5)
LYMPHOCYTES RELATIVE PERCENT: 19.4 % (ref 20–40)
MCH RBC QN AUTO: 29.4 PG (ref 27–31)
MCHC RBC AUTO-ENTMCNC: 31.9 G/DL (ref 33–37)
MCV RBC AUTO: 92.2 FL (ref 81–99)
MICROALBUMIN UR-MCNC: 18.6 MG/DL (ref 0–19)
MICROALBUMIN/CREAT UR-RTO: 200.9 MG/G
MONOCYTES ABSOLUTE: 0.5 K/UL (ref 0–0.9)
MONOCYTES RELATIVE PERCENT: 8.3 % (ref 0–10)
NEUTROPHILS ABSOLUTE: 3.9 K/UL (ref 1.5–7.5)
NEUTROPHILS RELATIVE PERCENT: 68.9 % (ref 50–65)
PARATHYROID HORMONE INTACT: 134.2 PG/ML (ref 15–65)
PDW BLD-RTO: 13.2 % (ref 11.5–14.5)
PLATELET # BLD: 172 K/UL (ref 130–400)
PMV BLD AUTO: 12.2 FL (ref 9.4–12.3)
POTASSIUM SERPL-SCNC: 4.8 MMOL/L (ref 3.5–5)
RBC # BLD: 3.57 M/UL (ref 4.2–5.4)
SODIUM BLD-SCNC: 145 MMOL/L (ref 136–145)
TOTAL IRON BINDING CAPACITY: 282 UG/DL (ref 250–400)
WBC # BLD: 5.7 K/UL (ref 4.8–10.8)

## 2018-07-30 ENCOUNTER — OFFICE VISIT (OUTPATIENT)
Dept: FAMILY MEDICINE CLINIC | Age: 78
End: 2018-07-30
Payer: COMMERCIAL

## 2018-07-30 VITALS
RESPIRATION RATE: 16 BRPM | DIASTOLIC BLOOD PRESSURE: 78 MMHG | BODY MASS INDEX: 23.95 KG/M2 | HEIGHT: 68 IN | HEART RATE: 64 BPM | SYSTOLIC BLOOD PRESSURE: 138 MMHG | OXYGEN SATURATION: 98 % | WEIGHT: 158 LBS | TEMPERATURE: 98.1 F

## 2018-07-30 DIAGNOSIS — R68.84 PAIN IN LOWER JAW: Primary | ICD-10-CM

## 2018-07-30 PROCEDURE — 99213 OFFICE O/P EST LOW 20 MIN: CPT | Performed by: FAMILY MEDICINE

## 2018-07-30 PROCEDURE — 96372 THER/PROPH/DIAG INJ SC/IM: CPT | Performed by: FAMILY MEDICINE

## 2018-07-30 RX ORDER — TRIAMCINOLONE ACETONIDE 40 MG/ML
40 INJECTION, SUSPENSION INTRA-ARTICULAR; INTRAMUSCULAR ONCE
Status: COMPLETED | OUTPATIENT
Start: 2018-07-30 | End: 2018-07-30

## 2018-07-30 RX ORDER — CYCLOBENZAPRINE HCL 5 MG
5 TABLET ORAL NIGHTLY PRN
Qty: 14 TABLET | Refills: 0 | Status: SHIPPED | OUTPATIENT
Start: 2018-07-30 | End: 2018-10-08 | Stop reason: SDUPTHER

## 2018-07-30 RX ADMIN — TRIAMCINOLONE ACETONIDE 40 MG: 40 INJECTION, SUSPENSION INTRA-ARTICULAR; INTRAMUSCULAR at 15:35

## 2018-07-30 NOTE — PROGRESS NOTES
SUBJECTIVE:    Patient ID: Gwendolyn Hunt is a 78 y.o. female. HPI:   Patient complaining of right jaw pain. Patient 61-year-old female. Chief complaint of right ear pain and jaw pain. She said that she have a hard time talking and she went because of the pain. He's actually seems to be getting better. No injuries. No fevers no chills. No nausea no vomiting no diarrhea.          Past Medical History:   Diagnosis Date    Blindness 2014    left eye due to sroke in 2013- peter     Chronic GERD 4/26/2017    Chronic kidney disease     dr Remi Lee pain     dr Palacios Form Headache     Hypertension     Hypothyroidism     Osteoarthritis     BILATERAL FEET    Osteoporosis     refuse treatment     Stroke [facility]) 2013    eventually lost left eye sight due to this- dr Hardik Landry Stroke [facility])       Current Outpatient Prescriptions   Medication Sig Dispense Refill    cyclobenzaprine (FLEXERIL) 5 MG tablet Take 1 tablet by mouth nightly as needed for Muscle spasms 14 tablet 0    B Complex-C (SUPER B COMPLEX PO) Take by mouth      CALCIUM PO Take by mouth      omeprazole (PRILOSEC) 40 MG delayed release capsule TAKE ONE CAPSULE BY MOUTH DAILY -SWALLOW WHOLE. DO NOT CHEW OR CRUSH. 30 capsule 5    metoprolol (LOPRESSOR) 100 MG tablet TAKE 1 TABLET TWICE A  tablet 3    levothyroxine (SYNTHROID) 50 MCG tablet TAKE ONE TABLET BY MOUTH DAILY 30 tablet 5    Nebulizers (AIRIAL COMPACT MINI NEBULIZER) MISC As directed 1 each 0    albuterol (PROVENTIL) (2.5 MG/3ML) 0.083% nebulizer solution Take 3 mLs by nebulization every 6 hours as needed for Wheezing 120 each 3    losartan (COZAAR) 100 MG tablet TAKE 1 TABLET DAILY 90 tablet 3    atorvastatin (LIPITOR) 20 MG tablet TAKE 1 TABLET NIGHTLY 90 tablet 3    febuxostat (ULORIC) 40 MG TABS tablet Take 1 tablet by mouth daily 90 tablet 3    amLODIPine (NORVASC) 5 MG tablet TAKE 1 TABLET DAILY 90 tablet 3    Misc Natural Products (GLUCOSAMINE CHOND COMPLEX/MSM PO) Take by mouth      chlorthalidone (HYGROTON) 25 MG tablet TAKE ONE TABLET BY MOUTH DAILY 30 tablet 5    aspirin 81 MG chewable tablet Take 1 tablet by mouth daily 30 tablet 0    Multiple Vitamins-Minerals (WOMENS 50+ MULTI VITAMIN/MIN PO) Take by mouth      Probiotic Product (PROBIOTIC DAILY PO) Take by mouth      Biotin 5000 MCG TABS Take by mouth daily       Current Facility-Administered Medications   Medication Dose Route Frequency Provider Last Rate Last Dose    triamcinolone acetonide (KENALOG-40) injection 40 mg  40 mg Intramuscular Once Fidelina Coleman MD          Allergies   Allergen Reactions    Phenergan [Promethazine] Anxiety       Review of Systems   Constitutional: Negative. HENT: Negative. Right ear pain and jaw pain   Eyes: Negative. Respiratory: Negative. Cardiovascular: Negative. Gastrointestinal: Negative. Endocrine: Negative. Genitourinary: Negative. Musculoskeletal: Negative. Skin: Negative. Allergic/Immunologic: Negative. Neurological: Negative. Hematological: Negative. Psychiatric/Behavioral: Negative. OBJECTIVE:    Physical Exam   Constitutional: She is oriented to person, place, and time. She appears well-developed and well-nourished. HENT:   Head: Normocephalic and atraumatic. Right Ear: External ear normal.   Left Ear: External ear normal.   Nose: Nose normal.   Mouth/Throat: Oropharynx is clear and moist.   Pain in the right jaw with opening. Eyes: Conjunctivae and EOM are normal. Pupils are equal, round, and reactive to light. Neck: Normal range of motion. Neck supple. Cardiovascular: Normal rate, regular rhythm, normal heart sounds and intact distal pulses. Pulmonary/Chest: Effort normal and breath sounds normal.   Abdominal: Soft. Bowel sounds are normal.   Musculoskeletal: Normal range of motion. Neurological: She is alert and oriented to person, place, and time. She has normal reflexes.    Skin: Skin is warm and dry.   Psychiatric: She has a normal mood and affect. Her behavior is normal. Judgment and thought content normal.   Vitals reviewed. BP (!) 158/80   Pulse 64   Temp 98.1 °F (36.7 °C) (Oral)   Resp 16   Ht 5' 8\" (1.727 m)   Wt 158 lb (71.7 kg)   SpO2 98%   BMI 24.02 kg/m²      ASSESSMENT:     Diagnosis Orders   1. Pain in lower jaw -TMJ versus osteoarthritis. triamcinolone acetonide (KENALOG-40) injection 40 mg    cyclobenzaprine (FLEXERIL) 5 MG tablet        PLAN:    1. Steroid shot. Trial of Flexeril at bedtime. ENT when necessary. Recheck blood pressure.  Follow-up as needed

## 2018-07-30 NOTE — PROGRESS NOTES
After obtaining consent, and per orders of Dr. Manny Peacock, injection of kenalog given in Right upper quad. gluteus by Nitish Carranza. Patient instructed to remain in clinic for 20 minutes afterwards, and to report any adverse reaction to me immediately.

## 2018-08-21 DIAGNOSIS — I10 ESSENTIAL HYPERTENSION: ICD-10-CM

## 2018-08-21 RX ORDER — AMLODIPINE BESYLATE 5 MG/1
TABLET ORAL
Qty: 90 TABLET | Refills: 3 | Status: SHIPPED | OUTPATIENT
Start: 2018-08-21 | End: 2019-12-12 | Stop reason: SDUPTHER

## 2018-10-03 ENCOUNTER — NURSE ONLY (OUTPATIENT)
Dept: FAMILY MEDICINE CLINIC | Age: 78
End: 2018-10-03
Payer: COMMERCIAL

## 2018-10-03 DIAGNOSIS — M25.552 PAIN OF LEFT HIP JOINT: Primary | ICD-10-CM

## 2018-10-03 PROCEDURE — 96372 THER/PROPH/DIAG INJ SC/IM: CPT | Performed by: FAMILY MEDICINE

## 2018-10-03 RX ORDER — TRIAMCINOLONE ACETONIDE 40 MG/ML
40 INJECTION, SUSPENSION INTRA-ARTICULAR; INTRAMUSCULAR ONCE
Status: COMPLETED | OUTPATIENT
Start: 2018-10-03 | End: 2018-10-03

## 2018-10-03 RX ADMIN — TRIAMCINOLONE ACETONIDE 40 MG: 40 INJECTION, SUSPENSION INTRA-ARTICULAR; INTRAMUSCULAR at 16:28

## 2018-10-08 DIAGNOSIS — R68.84 PAIN IN LOWER JAW: ICD-10-CM

## 2018-10-08 RX ORDER — CYCLOBENZAPRINE HCL 5 MG
TABLET ORAL
Qty: 14 TABLET | Refills: 0 | Status: SHIPPED | OUTPATIENT
Start: 2018-10-08 | End: 2018-10-16 | Stop reason: SDUPTHER

## 2018-10-16 ENCOUNTER — OFFICE VISIT (OUTPATIENT)
Dept: FAMILY MEDICINE CLINIC | Age: 78
End: 2018-10-16
Payer: COMMERCIAL

## 2018-10-16 VITALS
RESPIRATION RATE: 16 BRPM | DIASTOLIC BLOOD PRESSURE: 78 MMHG | SYSTOLIC BLOOD PRESSURE: 130 MMHG | OXYGEN SATURATION: 98 % | HEART RATE: 60 BPM | WEIGHT: 158 LBS | HEIGHT: 66 IN | BODY MASS INDEX: 25.39 KG/M2 | TEMPERATURE: 98.1 F

## 2018-10-16 DIAGNOSIS — N18.4 STAGE 4 CHRONIC KIDNEY DISEASE (HCC): ICD-10-CM

## 2018-10-16 DIAGNOSIS — Z23 NEED FOR INFLUENZA VACCINATION: ICD-10-CM

## 2018-10-16 DIAGNOSIS — M62.838 MUSCLE SPASM: ICD-10-CM

## 2018-10-16 DIAGNOSIS — M62.838 MUSCLE SPASM: Primary | ICD-10-CM

## 2018-10-16 DIAGNOSIS — R68.84 PAIN IN LOWER JAW: ICD-10-CM

## 2018-10-16 LAB
ALBUMIN SERPL-MCNC: 4.1 G/DL (ref 3.5–5.2)
ALP BLD-CCNC: 76 U/L (ref 35–104)
ALT SERPL-CCNC: 15 U/L (ref 5–33)
ANION GAP SERPL CALCULATED.3IONS-SCNC: 14 MMOL/L (ref 7–19)
AST SERPL-CCNC: 20 U/L (ref 5–32)
BILIRUB SERPL-MCNC: 0.5 MG/DL (ref 0.2–1.2)
BUN BLDV-MCNC: 46 MG/DL (ref 8–23)
CALCIUM SERPL-MCNC: 9.4 MG/DL (ref 8.8–10.2)
CHLORIDE BLD-SCNC: 104 MMOL/L (ref 98–111)
CO2: 27 MMOL/L (ref 22–29)
CREAT SERPL-MCNC: 1.8 MG/DL (ref 0.5–0.9)
GFR NON-AFRICAN AMERICAN: 27
GLUCOSE BLD-MCNC: 94 MG/DL (ref 74–109)
POTASSIUM SERPL-SCNC: 4.8 MMOL/L (ref 3.5–5)
SODIUM BLD-SCNC: 145 MMOL/L (ref 136–145)
TOTAL PROTEIN: 7.1 G/DL (ref 6.6–8.7)

## 2018-10-16 PROCEDURE — 99213 OFFICE O/P EST LOW 20 MIN: CPT | Performed by: FAMILY MEDICINE

## 2018-10-16 PROCEDURE — G0008 ADMIN INFLUENZA VIRUS VAC: HCPCS | Performed by: FAMILY MEDICINE

## 2018-10-16 PROCEDURE — 90662 IIV NO PRSV INCREASED AG IM: CPT | Performed by: FAMILY MEDICINE

## 2018-10-16 RX ORDER — CYCLOBENZAPRINE HCL 5 MG
5 TABLET ORAL 2 TIMES DAILY PRN
Qty: 60 TABLET | Refills: 3 | Status: SHIPPED | OUTPATIENT
Start: 2018-10-16 | End: 2019-12-12 | Stop reason: SDUPTHER

## 2018-10-16 RX ORDER — FEBUXOSTAT 40 MG/1
TABLET ORAL
Qty: 90 TABLET | Refills: 3 | Status: SHIPPED | OUTPATIENT
Start: 2018-10-16 | End: 2020-05-08

## 2018-10-16 ASSESSMENT — ENCOUNTER SYMPTOMS
RESPIRATORY NEGATIVE: 1
EYES NEGATIVE: 1
ALLERGIC/IMMUNOLOGIC NEGATIVE: 1
GASTROINTESTINAL NEGATIVE: 1

## 2018-10-16 NOTE — PROGRESS NOTES
DAILY 30 tablet 5    aspirin 81 MG chewable tablet Take 1 tablet by mouth daily 30 tablet 0    Multiple Vitamins-Minerals (WOMENS 50+ MULTI VITAMIN/MIN PO) Take by mouth      Biotin 5000 MCG TABS Take by mouth daily      omeprazole (PRILOSEC) 40 MG delayed release capsule TAKE ONE CAPSULE BY MOUTH DAILY -SWALLOW WHOLE. DO NOT CHEW OR CRUSH. 30 capsule 5    Nebulizers (AIRIAL COMPACT MINI NEBULIZER) MISC As directed 1 each 0    Misc Natural Products (GLUCOSAMINE CHOND COMPLEX/MSM PO) Take by mouth      Probiotic Product (PROBIOTIC DAILY PO) Take by mouth       No current facility-administered medications for this visit. Allergies   Allergen Reactions    Phenergan [Promethazine] Anxiety       Review of Systems   Constitutional: Negative. HENT: Negative. Eyes: Negative. Respiratory: Negative. Cardiovascular: Negative. Gastrointestinal: Negative. Endocrine: Negative. Genitourinary: Negative. Musculoskeletal: Positive for myalgias. Skin: Negative. Allergic/Immunologic: Negative. Neurological: Negative. Hematological: Negative. Psychiatric/Behavioral: Negative. OBJECTIVE:    Physical Exam   Constitutional: She is oriented to person, place, and time. She appears well-developed and well-nourished. HENT:   Head: Normocephalic and atraumatic. Right Ear: External ear normal.   Left Ear: External ear normal.   Nose: Nose normal.   Mouth/Throat: Oropharynx is clear and moist.   Eyes: Pupils are equal, round, and reactive to light. Conjunctivae and EOM are normal.   Neck: Normal range of motion. Neck supple. Cardiovascular: Normal rate, regular rhythm, normal heart sounds and intact distal pulses. Pulmonary/Chest: Effort normal and breath sounds normal.   Abdominal: Soft. Bowel sounds are normal.   Musculoskeletal: Normal range of motion. Neurological: She is alert and oriented to person, place, and time. She has normal reflexes.  Gait abnormal.   Mild left side weakness    Skin: Skin is warm and dry. Psychiatric: She has a normal mood and affect. Her behavior is normal. Judgment and thought content normal.   Vitals reviewed. /78   Pulse 60   Temp 98.1 °F (36.7 °C) (Oral)   Resp 16   Ht 5' 6\" (1.676 m)   Wt 158 lb (71.7 kg)   SpO2 98%   BMI 25.50 kg/m²      ASSESSMENT:     Diagnosis Orders   1. Muscle spasm -on going  cyclobenzaprine (FLEXERIL) 5 MG tablet    Comprehensive Metabolic Panel   2. Pain in lower jaw -More than likely TMJ responding to Flexeril  cyclobenzaprine (FLEXERIL) 5 MG tablet   3. Need for influenza vaccination  INFLUENZA, HIGH DOSE, 65 YRS +, IM, PF, PREFILL SYR, 0.5ML (FLUZONE HD)        PLAN:    1. Refill muscle relaxer. Check electrolytes. 2. Refill Flexeril. Use only as needed. Patient is aware that Flexeril can increase fall risk   3.  Influenza vaccine  Follow-up in 3 months

## 2018-12-13 ENCOUNTER — OFFICE VISIT (OUTPATIENT)
Dept: FAMILY MEDICINE CLINIC | Age: 78
End: 2018-12-13
Payer: COMMERCIAL

## 2018-12-13 ENCOUNTER — HOSPITAL ENCOUNTER (OUTPATIENT)
Dept: GENERAL RADIOLOGY | Age: 78
Discharge: HOME | End: 2018-12-13
Payer: COMMERCIAL

## 2018-12-13 VITALS
DIASTOLIC BLOOD PRESSURE: 80 MMHG | HEART RATE: 101 BPM | OXYGEN SATURATION: 97 % | WEIGHT: 158 LBS | SYSTOLIC BLOOD PRESSURE: 138 MMHG | BODY MASS INDEX: 25.5 KG/M2 | TEMPERATURE: 97.5 F

## 2018-12-13 DIAGNOSIS — M79.605 PAIN OF LEFT LOWER EXTREMITY: Primary | ICD-10-CM

## 2018-12-13 DIAGNOSIS — M79.605 PAIN OF LEFT LOWER EXTREMITY: ICD-10-CM

## 2018-12-13 PROCEDURE — 73502 X-RAY EXAM HIP UNI 2-3 VIEWS: CPT

## 2018-12-13 PROCEDURE — 99214 OFFICE O/P EST MOD 30 MIN: CPT | Performed by: FAMILY MEDICINE

## 2018-12-13 PROCEDURE — 73552 X-RAY EXAM OF FEMUR 2/>: CPT

## 2018-12-13 RX ORDER — GABAPENTIN 100 MG/1
100 CAPSULE ORAL 3 TIMES DAILY
Qty: 90 CAPSULE | Refills: 5 | Status: SHIPPED | OUTPATIENT
Start: 2018-12-13 | End: 2018-12-13 | Stop reason: CLARIF

## 2018-12-13 RX ORDER — TRAMADOL HYDROCHLORIDE 50 MG/1
50 TABLET ORAL EVERY 6 HOURS PRN
Qty: 28 TABLET | Refills: 0 | Status: SHIPPED | OUTPATIENT
Start: 2018-12-13 | End: 2019-01-15

## 2018-12-13 ASSESSMENT — ENCOUNTER SYMPTOMS
ALLERGIC/IMMUNOLOGIC NEGATIVE: 1
RESPIRATORY NEGATIVE: 1
GASTROINTESTINAL NEGATIVE: 1
EYES NEGATIVE: 1

## 2018-12-14 ENCOUNTER — TELEPHONE (OUTPATIENT)
Dept: NEUROLOGY | Age: 78
End: 2018-12-14

## 2018-12-18 ENCOUNTER — OFFICE VISIT (OUTPATIENT)
Dept: FAMILY MEDICINE CLINIC | Age: 78
End: 2018-12-18
Payer: COMMERCIAL

## 2018-12-18 ENCOUNTER — HOSPITAL ENCOUNTER (OUTPATIENT)
Dept: GENERAL RADIOLOGY | Age: 78
Discharge: HOME | End: 2018-12-18
Payer: COMMERCIAL

## 2018-12-18 VITALS
DIASTOLIC BLOOD PRESSURE: 78 MMHG | TEMPERATURE: 98.7 F | SYSTOLIC BLOOD PRESSURE: 122 MMHG | WEIGHT: 158 LBS | BODY MASS INDEX: 25.5 KG/M2 | HEART RATE: 71 BPM | OXYGEN SATURATION: 97 %

## 2018-12-18 DIAGNOSIS — J40 BRONCHITIS: Primary | ICD-10-CM

## 2018-12-18 DIAGNOSIS — J40 BRONCHITIS: ICD-10-CM

## 2018-12-18 LAB
ALBUMIN SERPL-MCNC: 4 G/DL (ref 3.5–5.2)
ALP BLD-CCNC: 73 U/L (ref 35–104)
ALT SERPL-CCNC: 16 U/L (ref 5–33)
ANION GAP SERPL CALCULATED.3IONS-SCNC: 14 MMOL/L (ref 7–19)
AST SERPL-CCNC: 20 U/L (ref 5–32)
BILIRUB SERPL-MCNC: 0.6 MG/DL (ref 0.2–1.2)
BUN BLDV-MCNC: 39 MG/DL (ref 8–23)
CALCIUM SERPL-MCNC: 9 MG/DL (ref 8.8–10.2)
CHLORIDE BLD-SCNC: 100 MMOL/L (ref 98–111)
CO2: 27 MMOL/L (ref 22–29)
CREAT SERPL-MCNC: 1.9 MG/DL (ref 0.5–0.9)
GFR NON-AFRICAN AMERICAN: 26
GLUCOSE BLD-MCNC: 91 MG/DL (ref 74–109)
HCT VFR BLD CALC: 35.4 % (ref 37–47)
HEMOGLOBIN: 11.1 G/DL (ref 12–16)
MCH RBC QN AUTO: 29.6 PG (ref 27–31)
MCHC RBC AUTO-ENTMCNC: 31.4 G/DL (ref 33–37)
MCV RBC AUTO: 94.4 FL (ref 81–99)
PDW BLD-RTO: 13.1 % (ref 11.5–14.5)
PLATELET # BLD: 164 K/UL (ref 130–400)
PMV BLD AUTO: 12.7 FL (ref 9.4–12.3)
POTASSIUM SERPL-SCNC: 4.6 MMOL/L (ref 3.5–5)
RBC # BLD: 3.75 M/UL (ref 4.2–5.4)
SODIUM BLD-SCNC: 141 MMOL/L (ref 136–145)
TOTAL PROTEIN: 7.1 G/DL (ref 6.6–8.7)
WBC # BLD: 6.1 K/UL (ref 4.8–10.8)

## 2018-12-18 PROCEDURE — 96372 THER/PROPH/DIAG INJ SC/IM: CPT | Performed by: FAMILY MEDICINE

## 2018-12-18 PROCEDURE — 71046 X-RAY EXAM CHEST 2 VIEWS: CPT

## 2018-12-18 PROCEDURE — 99214 OFFICE O/P EST MOD 30 MIN: CPT | Performed by: FAMILY MEDICINE

## 2018-12-18 RX ORDER — TRIAMCINOLONE ACETONIDE 40 MG/ML
40 INJECTION, SUSPENSION INTRA-ARTICULAR; INTRAMUSCULAR ONCE
Status: COMPLETED | OUTPATIENT
Start: 2018-12-18 | End: 2018-12-18

## 2018-12-18 RX ORDER — DEXAMETHASONE SODIUM PHOSPHATE 4 MG/ML
4 INJECTION, SOLUTION INTRA-ARTICULAR; INTRALESIONAL; INTRAMUSCULAR; INTRAVENOUS; SOFT TISSUE ONCE
Status: COMPLETED | OUTPATIENT
Start: 2018-12-18 | End: 2018-12-18

## 2018-12-18 RX ORDER — PREDNISONE 20 MG/1
20 TABLET ORAL DAILY
Qty: 5 TABLET | Refills: 0 | Status: SHIPPED | OUTPATIENT
Start: 2018-12-18 | End: 2018-12-23

## 2018-12-18 RX ORDER — CEFDINIR 300 MG/1
300 CAPSULE ORAL DAILY
Qty: 10 CAPSULE | Refills: 0 | Status: SHIPPED | OUTPATIENT
Start: 2018-12-18 | End: 2018-12-28

## 2018-12-18 RX ADMIN — DEXAMETHASONE SODIUM PHOSPHATE 4 MG: 4 INJECTION, SOLUTION INTRA-ARTICULAR; INTRALESIONAL; INTRAMUSCULAR; INTRAVENOUS; SOFT TISSUE at 10:51

## 2018-12-18 RX ADMIN — TRIAMCINOLONE ACETONIDE 40 MG: 40 INJECTION, SUSPENSION INTRA-ARTICULAR; INTRAMUSCULAR at 10:52

## 2018-12-18 ASSESSMENT — ENCOUNTER SYMPTOMS
ALLERGIC/IMMUNOLOGIC NEGATIVE: 1
GASTROINTESTINAL NEGATIVE: 1
COUGH: 1
WHEEZING: 1
EYES NEGATIVE: 1

## 2019-01-10 ENCOUNTER — TELEPHONE (OUTPATIENT)
Dept: FAMILY MEDICINE CLINIC | Age: 79
End: 2019-01-10

## 2019-01-10 ENCOUNTER — OFFICE VISIT (OUTPATIENT)
Dept: NEUROLOGY | Age: 79
End: 2019-01-10
Payer: COMMERCIAL

## 2019-01-10 VITALS
BODY MASS INDEX: 24.48 KG/M2 | HEIGHT: 67 IN | HEART RATE: 70 BPM | WEIGHT: 156 LBS | DIASTOLIC BLOOD PRESSURE: 93 MMHG | SYSTOLIC BLOOD PRESSURE: 176 MMHG | OXYGEN SATURATION: 98 %

## 2019-01-10 DIAGNOSIS — R09.89 DECREASED DORSALIS PEDIS PULSE: ICD-10-CM

## 2019-01-10 DIAGNOSIS — M79.605 LEFT LEG PAIN: Primary | ICD-10-CM

## 2019-01-10 PROCEDURE — 99213 OFFICE O/P EST LOW 20 MIN: CPT | Performed by: PHYSICIAN ASSISTANT

## 2019-01-14 RX ORDER — OMEPRAZOLE 40 MG/1
CAPSULE, DELAYED RELEASE ORAL
Qty: 90 CAPSULE | Refills: 3 | Status: SHIPPED | OUTPATIENT
Start: 2019-01-14 | End: 2020-05-08

## 2019-01-14 RX ORDER — CHLORTHALIDONE 25 MG/1
TABLET ORAL
Qty: 90 TABLET | Refills: 3 | Status: ON HOLD | OUTPATIENT
Start: 2019-01-14 | End: 2021-01-01 | Stop reason: HOSPADM

## 2019-01-15 ENCOUNTER — OFFICE VISIT (OUTPATIENT)
Dept: FAMILY MEDICINE CLINIC | Age: 79
End: 2019-01-15
Payer: COMMERCIAL

## 2019-01-15 VITALS
OXYGEN SATURATION: 93 % | SYSTOLIC BLOOD PRESSURE: 132 MMHG | BODY MASS INDEX: 24.43 KG/M2 | TEMPERATURE: 97.7 F | WEIGHT: 156 LBS | DIASTOLIC BLOOD PRESSURE: 78 MMHG | HEART RATE: 63 BPM

## 2019-01-15 DIAGNOSIS — E78.5 HYPERLIPIDEMIA, UNSPECIFIED HYPERLIPIDEMIA TYPE: ICD-10-CM

## 2019-01-15 DIAGNOSIS — I10 ESSENTIAL HYPERTENSION: ICD-10-CM

## 2019-01-15 DIAGNOSIS — Z23 NEED FOR TETANUS BOOSTER: ICD-10-CM

## 2019-01-15 DIAGNOSIS — S41.111A LACERATION OF RIGHT UPPER EXTREMITY, INITIAL ENCOUNTER: Primary | ICD-10-CM

## 2019-01-15 DIAGNOSIS — E03.9 ACQUIRED HYPOTHYROIDISM: ICD-10-CM

## 2019-01-15 LAB
ALBUMIN SERPL-MCNC: 4 G/DL (ref 3.5–5.2)
ALP BLD-CCNC: 72 U/L (ref 35–104)
ALT SERPL-CCNC: 14 U/L (ref 5–33)
ANION GAP SERPL CALCULATED.3IONS-SCNC: 16 MMOL/L (ref 7–19)
AST SERPL-CCNC: 18 U/L (ref 5–32)
BILIRUB SERPL-MCNC: 0.5 MG/DL (ref 0.2–1.2)
BUN BLDV-MCNC: 44 MG/DL (ref 8–23)
CALCIUM SERPL-MCNC: 9.1 MG/DL (ref 8.8–10.2)
CHLORIDE BLD-SCNC: 105 MMOL/L (ref 98–111)
CO2: 25 MMOL/L (ref 22–29)
CREAT SERPL-MCNC: 2 MG/DL (ref 0.5–0.9)
GFR NON-AFRICAN AMERICAN: 24
GLUCOSE BLD-MCNC: 136 MG/DL (ref 74–109)
HCT VFR BLD CALC: 36.5 % (ref 37–47)
HEMOGLOBIN: 11.3 G/DL (ref 12–16)
MCH RBC QN AUTO: 29.6 PG (ref 27–31)
MCHC RBC AUTO-ENTMCNC: 31 G/DL (ref 33–37)
MCV RBC AUTO: 95.5 FL (ref 81–99)
PDW BLD-RTO: 13.2 % (ref 11.5–14.5)
PLATELET # BLD: 176 K/UL (ref 130–400)
PMV BLD AUTO: 12.4 FL (ref 9.4–12.3)
POTASSIUM SERPL-SCNC: 4.6 MMOL/L (ref 3.5–5)
RBC # BLD: 3.82 M/UL (ref 4.2–5.4)
SODIUM BLD-SCNC: 146 MMOL/L (ref 136–145)
TOTAL PROTEIN: 6.7 G/DL (ref 6.6–8.7)
TSH SERPL DL<=0.05 MIU/L-ACNC: 4.72 UIU/ML (ref 0.27–4.2)
WBC # BLD: 7.2 K/UL (ref 4.8–10.8)

## 2019-01-15 PROCEDURE — 90714 TD VACC NO PRESV 7 YRS+ IM: CPT | Performed by: FAMILY MEDICINE

## 2019-01-15 PROCEDURE — 99214 OFFICE O/P EST MOD 30 MIN: CPT | Performed by: FAMILY MEDICINE

## 2019-01-15 PROCEDURE — 90471 IMMUNIZATION ADMIN: CPT | Performed by: FAMILY MEDICINE

## 2019-01-15 RX ORDER — LEVOTHYROXINE SODIUM 0.05 MG/1
TABLET ORAL
Qty: 30 TABLET | Refills: 5 | Status: SHIPPED | OUTPATIENT
Start: 2019-01-15 | End: 2019-01-18 | Stop reason: DRUGHIGH

## 2019-01-15 RX ORDER — METOPROLOL TARTRATE 100 MG/1
TABLET ORAL
Qty: 180 TABLET | Refills: 3 | Status: SHIPPED | OUTPATIENT
Start: 2019-01-15 | End: 2020-05-08

## 2019-01-15 RX ORDER — LOSARTAN POTASSIUM 100 MG/1
TABLET ORAL
Qty: 90 TABLET | Refills: 3 | Status: SHIPPED | OUTPATIENT
Start: 2019-01-15 | End: 2020-07-16

## 2019-01-15 RX ORDER — ATORVASTATIN CALCIUM 20 MG/1
TABLET, FILM COATED ORAL
Qty: 90 TABLET | Refills: 3 | Status: SHIPPED | OUTPATIENT
Start: 2019-01-15 | End: 2020-05-08

## 2019-01-15 ASSESSMENT — ENCOUNTER SYMPTOMS
RESPIRATORY NEGATIVE: 1
ALLERGIC/IMMUNOLOGIC NEGATIVE: 1
GASTROINTESTINAL NEGATIVE: 1
EYES NEGATIVE: 1

## 2019-01-18 DIAGNOSIS — E03.9 ACQUIRED HYPOTHYROIDISM: Primary | ICD-10-CM

## 2019-01-18 RX ORDER — LEVOTHYROXINE SODIUM 0.07 MG/1
75 TABLET ORAL DAILY
Qty: 90 TABLET | Refills: 0 | Status: SHIPPED | OUTPATIENT
Start: 2019-01-18 | End: 2019-04-18 | Stop reason: SDUPTHER

## 2019-01-22 LAB
ALBUMIN SERPL-MCNC: 4.2 G/DL (ref 3.5–5.2)
ALP BLD-CCNC: 69 U/L (ref 35–104)
ALT SERPL-CCNC: 13 U/L (ref 5–33)
ANION GAP SERPL CALCULATED.3IONS-SCNC: 20 MMOL/L (ref 7–19)
AST SERPL-CCNC: 18 U/L (ref 5–32)
BASOPHILS ABSOLUTE: 0 K/UL (ref 0–0.2)
BASOPHILS RELATIVE PERCENT: 0.4 % (ref 0–1)
BILIRUB SERPL-MCNC: 0.6 MG/DL (ref 0.2–1.2)
BUN BLDV-MCNC: 38 MG/DL (ref 8–23)
CALCIUM SERPL-MCNC: 9.7 MG/DL (ref 8.8–10.2)
CHLORIDE BLD-SCNC: 108 MMOL/L (ref 98–111)
CO2: 22 MMOL/L (ref 22–29)
CREAT SERPL-MCNC: 1.8 MG/DL (ref 0.5–0.9)
CREATININE URINE: 229.2 MG/DL (ref 4.2–622)
EOSINOPHILS ABSOLUTE: 0.1 K/UL (ref 0–0.6)
EOSINOPHILS RELATIVE PERCENT: 1.9 % (ref 0–5)
GFR NON-AFRICAN AMERICAN: 27
GLUCOSE BLD-MCNC: 108 MG/DL (ref 74–109)
HCT VFR BLD CALC: 37.8 % (ref 37–47)
HEMOGLOBIN: 12.1 G/DL (ref 12–16)
LYMPHOCYTES ABSOLUTE: 1.1 K/UL (ref 1.1–4.5)
LYMPHOCYTES RELATIVE PERCENT: 16.1 % (ref 20–40)
MCH RBC QN AUTO: 30.3 PG (ref 27–31)
MCHC RBC AUTO-ENTMCNC: 32 G/DL (ref 33–37)
MCV RBC AUTO: 94.7 FL (ref 81–99)
MICROALBUMIN UR-MCNC: 67.5 MG/DL (ref 0–19)
MICROALBUMIN/CREAT UR-RTO: 294.5 MG/G
MONOCYTES ABSOLUTE: 0.5 K/UL (ref 0–0.9)
MONOCYTES RELATIVE PERCENT: 6.7 % (ref 0–10)
NEUTROPHILS ABSOLUTE: 5.2 K/UL (ref 1.5–7.5)
NEUTROPHILS RELATIVE PERCENT: 74.2 % (ref 50–65)
PARATHYROID HORMONE INTACT: 128.2 PG/ML (ref 15–65)
PDW BLD-RTO: 12.8 % (ref 11.5–14.5)
PLATELET # BLD: 170 K/UL (ref 130–400)
PMV BLD AUTO: 12.1 FL (ref 9.4–12.3)
POTASSIUM SERPL-SCNC: 4.3 MMOL/L (ref 3.5–5)
RBC # BLD: 3.99 M/UL (ref 4.2–5.4)
SODIUM BLD-SCNC: 150 MMOL/L (ref 136–145)
TOTAL PROTEIN: 7.3 G/DL (ref 6.6–8.7)
WBC # BLD: 7 K/UL (ref 4.8–10.8)

## 2019-01-23 ENCOUNTER — HOSPITAL ENCOUNTER (OUTPATIENT)
Dept: VASCULAR LAB | Age: 79
Discharge: HOME | End: 2019-01-23
Payer: MEDICARE

## 2019-01-23 ENCOUNTER — HOSPITAL ENCOUNTER (OUTPATIENT)
Dept: NEUROLOGY | Age: 79
Discharge: HOME | End: 2019-01-23
Payer: MEDICARE

## 2019-01-23 DIAGNOSIS — M79.605 LEFT LEG PAIN: ICD-10-CM

## 2019-01-23 DIAGNOSIS — R09.89 DECREASED DORSALIS PEDIS PULSE: ICD-10-CM

## 2019-01-23 PROCEDURE — 93971 EXTREMITY STUDY: CPT

## 2019-01-23 PROCEDURE — 95886 MUSC TEST DONE W/N TEST COMP: CPT | Performed by: PSYCHIATRY & NEUROLOGY

## 2019-01-23 PROCEDURE — 95909 NRV CNDJ TST 5-6 STUDIES: CPT | Performed by: PSYCHIATRY & NEUROLOGY

## 2019-01-23 PROCEDURE — 95909 NRV CNDJ TST 5-6 STUDIES: CPT

## 2019-01-23 PROCEDURE — 95886 MUSC TEST DONE W/N TEST COMP: CPT

## 2019-01-31 ENCOUNTER — TELEPHONE (OUTPATIENT)
Dept: NEUROLOGY | Age: 79
End: 2019-01-31

## 2019-01-31 DIAGNOSIS — M79.605 LEFT LEG PAIN: Primary | ICD-10-CM

## 2019-01-31 DIAGNOSIS — I73.9 PVD (PERIPHERAL VASCULAR DISEASE) (HCC): ICD-10-CM

## 2019-02-06 ENCOUNTER — HOSPITAL ENCOUNTER (OUTPATIENT)
Dept: VASCULAR LAB | Age: 79
Discharge: HOME | End: 2019-02-06
Payer: COMMERCIAL

## 2019-02-06 DIAGNOSIS — I73.9 PVD (PERIPHERAL VASCULAR DISEASE) (HCC): ICD-10-CM

## 2019-02-06 PROCEDURE — 93923 UPR/LXTR ART STDY 3+ LVLS: CPT

## 2019-02-11 ENCOUNTER — TELEPHONE (OUTPATIENT)
Dept: NEUROLOGY | Age: 79
End: 2019-02-11

## 2019-02-18 ENCOUNTER — OFFICE VISIT (OUTPATIENT)
Dept: NEUROLOGY | Age: 79
End: 2019-02-18
Payer: COMMERCIAL

## 2019-02-18 VITALS
OXYGEN SATURATION: 96 % | BODY MASS INDEX: 25.07 KG/M2 | HEIGHT: 66 IN | HEART RATE: 89 BPM | WEIGHT: 156 LBS | DIASTOLIC BLOOD PRESSURE: 90 MMHG | SYSTOLIC BLOOD PRESSURE: 208 MMHG

## 2019-02-18 DIAGNOSIS — G60.9 IDIOPATHIC PERIPHERAL NEUROPATHY: ICD-10-CM

## 2019-02-18 DIAGNOSIS — M79.605 PAIN IN BOTH LOWER EXTREMITIES: Primary | ICD-10-CM

## 2019-02-18 DIAGNOSIS — G25.81 RESTLESS LEGS SYNDROME: ICD-10-CM

## 2019-02-18 DIAGNOSIS — M79.604 PAIN IN BOTH LOWER EXTREMITIES: Primary | ICD-10-CM

## 2019-02-18 PROCEDURE — 99213 OFFICE O/P EST LOW 20 MIN: CPT | Performed by: PSYCHIATRY & NEUROLOGY

## 2019-02-18 RX ORDER — GABAPENTIN 100 MG/1
100 CAPSULE ORAL 3 TIMES DAILY
Qty: 90 CAPSULE | Refills: 5 | Status: ON HOLD | OUTPATIENT
Start: 2019-02-18 | End: 2019-04-27 | Stop reason: SDUPTHER

## 2019-03-11 ENCOUNTER — NURSE ONLY (OUTPATIENT)
Dept: FAMILY MEDICINE CLINIC | Age: 79
End: 2019-03-11
Payer: COMMERCIAL

## 2019-03-11 DIAGNOSIS — M19.90 ARTHRITIS: Primary | ICD-10-CM

## 2019-03-11 PROCEDURE — 96372 THER/PROPH/DIAG INJ SC/IM: CPT | Performed by: FAMILY MEDICINE

## 2019-03-11 RX ORDER — DEXAMETHASONE SODIUM PHOSPHATE 4 MG/ML
4 INJECTION, SOLUTION INTRA-ARTICULAR; INTRALESIONAL; INTRAMUSCULAR; INTRAVENOUS; SOFT TISSUE ONCE
Status: COMPLETED | OUTPATIENT
Start: 2019-03-11 | End: 2019-03-11

## 2019-03-11 RX ORDER — TRIAMCINOLONE ACETONIDE 40 MG/ML
40 INJECTION, SUSPENSION INTRA-ARTICULAR; INTRAMUSCULAR ONCE
Status: COMPLETED | OUTPATIENT
Start: 2019-03-11 | End: 2019-03-11

## 2019-03-11 RX ADMIN — DEXAMETHASONE SODIUM PHOSPHATE 4 MG: 4 INJECTION, SOLUTION INTRA-ARTICULAR; INTRALESIONAL; INTRAMUSCULAR; INTRAVENOUS; SOFT TISSUE at 15:45

## 2019-03-11 RX ADMIN — TRIAMCINOLONE ACETONIDE 40 MG: 40 INJECTION, SUSPENSION INTRA-ARTICULAR; INTRAMUSCULAR at 15:46

## 2019-04-18 DIAGNOSIS — E03.9 ACQUIRED HYPOTHYROIDISM: ICD-10-CM

## 2019-04-18 RX ORDER — LEVOTHYROXINE SODIUM 0.07 MG/1
75 TABLET ORAL DAILY
Qty: 90 TABLET | Refills: 0 | Status: SHIPPED | OUTPATIENT
Start: 2019-04-18 | End: 2019-10-07 | Stop reason: DRUGHIGH

## 2019-04-24 ENCOUNTER — APPOINTMENT (OUTPATIENT)
Dept: GENERAL RADIOLOGY | Age: 79
DRG: 482 | End: 2019-04-24
Payer: MEDICARE

## 2019-04-24 ENCOUNTER — HOSPITAL ENCOUNTER (INPATIENT)
Age: 79
LOS: 3 days | Discharge: NURSING, REHAB OR LONG-TERM CARE | DRG: 482 | End: 2019-04-27
Attending: INTERNAL MEDICINE | Admitting: INTERNAL MEDICINE
Payer: MEDICARE

## 2019-04-24 DIAGNOSIS — S72.142A CLOSED FRACTURE OF FEMUR, INTERTROCHANTERIC, LEFT, INITIAL ENCOUNTER (HCC): Primary | ICD-10-CM

## 2019-04-24 DIAGNOSIS — G25.81 RESTLESS LEGS SYNDROME: ICD-10-CM

## 2019-04-24 LAB
ABO/RH: NORMAL
ALBUMIN SERPL-MCNC: 3.8 G/DL (ref 3.5–5.2)
ALP BLD-CCNC: 70 U/L (ref 35–104)
ALT SERPL-CCNC: 17 U/L (ref 5–33)
ANION GAP SERPL CALCULATED.3IONS-SCNC: 15 MMOL/L (ref 7–19)
ANTIBODY SCREEN: NORMAL
APTT: 26.6 SEC (ref 26–36.2)
AST SERPL-CCNC: 21 U/L (ref 5–32)
BACTERIA: NEGATIVE /HPF
BASOPHILS ABSOLUTE: 0 K/UL (ref 0–0.2)
BASOPHILS RELATIVE PERCENT: 0.5 % (ref 0–1)
BILIRUB SERPL-MCNC: 0.6 MG/DL (ref 0.2–1.2)
BILIRUBIN URINE: NEGATIVE
BLOOD, URINE: NEGATIVE
BUN BLDV-MCNC: 39 MG/DL (ref 8–23)
CALCIUM SERPL-MCNC: 8.7 MG/DL (ref 8.8–10.2)
CHLORIDE BLD-SCNC: 105 MMOL/L (ref 98–111)
CLARITY: CLEAR
CO2: 21 MMOL/L (ref 22–29)
COLOR: YELLOW
CREAT SERPL-MCNC: 1.6 MG/DL (ref 0.5–0.9)
EOSINOPHILS ABSOLUTE: 0.1 K/UL (ref 0–0.6)
EOSINOPHILS RELATIVE PERCENT: 1.4 % (ref 0–5)
EPITHELIAL CELLS, UA: 1 /HPF (ref 0–5)
GFR NON-AFRICAN AMERICAN: 31
GLUCOSE BLD-MCNC: 94 MG/DL (ref 74–109)
GLUCOSE URINE: NEGATIVE MG/DL
HCT VFR BLD CALC: 33.7 % (ref 37–47)
HEMOGLOBIN: 10.8 G/DL (ref 12–16)
HYALINE CASTS: 1 /HPF (ref 0–8)
INR BLD: 1.05 (ref 0.88–1.18)
KETONES, URINE: NEGATIVE MG/DL
LEUKOCYTE ESTERASE, URINE: NEGATIVE
LYMPHOCYTES ABSOLUTE: 0.9 K/UL (ref 1.1–4.5)
LYMPHOCYTES RELATIVE PERCENT: 16.3 % (ref 20–40)
MCH RBC QN AUTO: 29.8 PG (ref 27–31)
MCHC RBC AUTO-ENTMCNC: 32 G/DL (ref 33–37)
MCV RBC AUTO: 93.1 FL (ref 81–99)
MONOCYTES ABSOLUTE: 0.4 K/UL (ref 0–0.9)
MONOCYTES RELATIVE PERCENT: 6.6 % (ref 0–10)
NEUTROPHILS ABSOLUTE: 4.3 K/UL (ref 1.5–7.5)
NEUTROPHILS RELATIVE PERCENT: 74 % (ref 50–65)
NITRITE, URINE: NEGATIVE
PDW BLD-RTO: 12.8 % (ref 11.5–14.5)
PH UA: 6.5 (ref 5–8)
PLATELET # BLD: 175 K/UL (ref 130–400)
PMV BLD AUTO: 12.2 FL (ref 9.4–12.3)
POTASSIUM SERPL-SCNC: 4.4 MMOL/L (ref 3.5–5)
PROTEIN UA: 30 MG/DL
PROTHROMBIN TIME: 13.1 SEC (ref 12–14.6)
RBC # BLD: 3.62 M/UL (ref 4.2–5.4)
RBC UA: 0 /HPF (ref 0–4)
SODIUM BLD-SCNC: 141 MMOL/L (ref 136–145)
SPECIFIC GRAVITY UA: 1.01 (ref 1–1.03)
TOTAL PROTEIN: 6.9 G/DL (ref 6.6–8.7)
URINE REFLEX TO CULTURE: ABNORMAL
UROBILINOGEN, URINE: 0.2 E.U./DL
WBC # BLD: 5.8 K/UL (ref 4.8–10.8)
WBC UA: 0 /HPF (ref 0–5)

## 2019-04-24 PROCEDURE — 6360000002 HC RX W HCPCS: Performed by: NURSE PRACTITIONER

## 2019-04-24 PROCEDURE — 96375 TX/PRO/DX INJ NEW DRUG ADDON: CPT

## 2019-04-24 PROCEDURE — 85610 PROTHROMBIN TIME: CPT

## 2019-04-24 PROCEDURE — 99285 EMERGENCY DEPT VISIT HI MDM: CPT | Performed by: NURSE PRACTITIONER

## 2019-04-24 PROCEDURE — 85025 COMPLETE CBC W/AUTO DIFF WBC: CPT

## 2019-04-24 PROCEDURE — 36415 COLL VENOUS BLD VENIPUNCTURE: CPT

## 2019-04-24 PROCEDURE — 80053 COMPREHEN METABOLIC PANEL: CPT

## 2019-04-24 PROCEDURE — 81001 URINALYSIS AUTO W/SCOPE: CPT

## 2019-04-24 PROCEDURE — 86850 RBC ANTIBODY SCREEN: CPT

## 2019-04-24 PROCEDURE — 99285 EMERGENCY DEPT VISIT HI MDM: CPT

## 2019-04-24 PROCEDURE — 2580000003 HC RX 258: Performed by: INTERNAL MEDICINE

## 2019-04-24 PROCEDURE — 99222 1ST HOSP IP/OBS MODERATE 55: CPT | Performed by: INTERNAL MEDICINE

## 2019-04-24 PROCEDURE — 96376 TX/PRO/DX INJ SAME DRUG ADON: CPT

## 2019-04-24 PROCEDURE — 6360000002 HC RX W HCPCS: Performed by: INTERNAL MEDICINE

## 2019-04-24 PROCEDURE — 73502 X-RAY EXAM HIP UNI 2-3 VIEWS: CPT

## 2019-04-24 PROCEDURE — 93005 ELECTROCARDIOGRAM TRACING: CPT

## 2019-04-24 PROCEDURE — 96374 THER/PROPH/DIAG INJ IV PUSH: CPT

## 2019-04-24 PROCEDURE — 85730 THROMBOPLASTIN TIME PARTIAL: CPT

## 2019-04-24 PROCEDURE — 1210000000 HC MED SURG R&B

## 2019-04-24 PROCEDURE — 86901 BLOOD TYPING SEROLOGIC RH(D): CPT

## 2019-04-24 PROCEDURE — 71045 X-RAY EXAM CHEST 1 VIEW: CPT

## 2019-04-24 PROCEDURE — 86900 BLOOD TYPING SEROLOGIC ABO: CPT

## 2019-04-24 RX ORDER — ONDANSETRON 2 MG/ML
4 INJECTION INTRAMUSCULAR; INTRAVENOUS EVERY 6 HOURS PRN
Status: DISCONTINUED | OUTPATIENT
Start: 2019-04-24 | End: 2019-04-27 | Stop reason: HOSPADM

## 2019-04-24 RX ORDER — SODIUM CHLORIDE 9 MG/ML
INJECTION, SOLUTION INTRAVENOUS CONTINUOUS
Status: DISCONTINUED | OUTPATIENT
Start: 2019-04-24 | End: 2019-04-27

## 2019-04-24 RX ORDER — SENNA PLUS 8.6 MG/1
1 TABLET ORAL DAILY PRN
Status: DISCONTINUED | OUTPATIENT
Start: 2019-04-24 | End: 2019-04-27 | Stop reason: HOSPADM

## 2019-04-24 RX ORDER — ONDANSETRON 2 MG/ML
4 INJECTION INTRAMUSCULAR; INTRAVENOUS ONCE
Status: COMPLETED | OUTPATIENT
Start: 2019-04-24 | End: 2019-04-24

## 2019-04-24 RX ORDER — ACETAMINOPHEN 325 MG/1
650 TABLET ORAL EVERY 8 HOURS PRN
Status: DISCONTINUED | OUTPATIENT
Start: 2019-04-24 | End: 2019-04-27 | Stop reason: HOSPADM

## 2019-04-24 RX ORDER — MORPHINE SULFATE 4 MG/ML
4 INJECTION, SOLUTION INTRAMUSCULAR; INTRAVENOUS ONCE
Status: COMPLETED | OUTPATIENT
Start: 2019-04-24 | End: 2019-04-24

## 2019-04-24 RX ORDER — FENTANYL CITRATE 50 UG/ML
25 INJECTION, SOLUTION INTRAMUSCULAR; INTRAVENOUS EVERY 4 HOURS PRN
Status: DISPENSED | OUTPATIENT
Start: 2019-04-24 | End: 2019-04-25

## 2019-04-24 RX ADMIN — ONDANSETRON 4 MG: 2 INJECTION INTRAMUSCULAR; INTRAVENOUS at 19:40

## 2019-04-24 RX ADMIN — MORPHINE SULFATE 4 MG: 4 INJECTION INTRAVENOUS at 19:40

## 2019-04-24 RX ADMIN — MORPHINE SULFATE 4 MG: 4 INJECTION INTRAVENOUS at 21:00

## 2019-04-24 RX ADMIN — FENTANYL CITRATE 25 MCG: 50 INJECTION, SOLUTION INTRAMUSCULAR; INTRAVENOUS at 22:57

## 2019-04-24 RX ADMIN — SODIUM CHLORIDE: 9 INJECTION, SOLUTION INTRAVENOUS at 22:51

## 2019-04-24 ASSESSMENT — PAIN SCALES - GENERAL
PAINLEVEL_OUTOF10: 8
PAINLEVEL_OUTOF10: 7
PAINLEVEL_OUTOF10: 8
PAINLEVEL_OUTOF10: 0

## 2019-04-24 ASSESSMENT — ENCOUNTER SYMPTOMS
SINUS PRESSURE: 0
COUGH: 0
VOMITING: 0
NAUSEA: 0
DIARRHEA: 0
SORE THROAT: 0
CONSTIPATION: 0
SHORTNESS OF BREATH: 0
TROUBLE SWALLOWING: 0
RHINORRHEA: 0
ABDOMINAL PAIN: 0

## 2019-04-25 ENCOUNTER — ANESTHESIA EVENT (OUTPATIENT)
Dept: OPERATING ROOM | Age: 79
DRG: 482 | End: 2019-04-25
Payer: MEDICARE

## 2019-04-25 ENCOUNTER — ANESTHESIA (OUTPATIENT)
Dept: OPERATING ROOM | Age: 79
DRG: 482 | End: 2019-04-25
Payer: MEDICARE

## 2019-04-25 ENCOUNTER — APPOINTMENT (OUTPATIENT)
Dept: GENERAL RADIOLOGY | Age: 79
DRG: 482 | End: 2019-04-25
Payer: MEDICARE

## 2019-04-25 VITALS
OXYGEN SATURATION: 97 % | DIASTOLIC BLOOD PRESSURE: 70 MMHG | RESPIRATION RATE: 30 BRPM | SYSTOLIC BLOOD PRESSURE: 160 MMHG | TEMPERATURE: 98 F

## 2019-04-25 LAB
ANION GAP SERPL CALCULATED.3IONS-SCNC: 10 MMOL/L (ref 7–19)
BASOPHILS ABSOLUTE: 0 K/UL (ref 0–0.2)
BASOPHILS RELATIVE PERCENT: 0.2 % (ref 0–1)
BUN BLDV-MCNC: 35 MG/DL (ref 8–23)
CALCIUM SERPL-MCNC: 8.9 MG/DL (ref 8.8–10.2)
CHLORIDE BLD-SCNC: 108 MMOL/L (ref 98–111)
CO2: 26 MMOL/L (ref 22–29)
CREAT SERPL-MCNC: 1.5 MG/DL (ref 0.5–0.9)
EKG P AXIS: 70 DEGREES
EKG P-R INTERVAL: 184 MS
EKG Q-T INTERVAL: 408 MS
EKG QRS DURATION: 84 MS
EKG QTC CALCULATION (BAZETT): 434 MS
EKG T AXIS: 69 DEGREES
EOSINOPHILS ABSOLUTE: 0 K/UL (ref 0–0.6)
EOSINOPHILS RELATIVE PERCENT: 0.5 % (ref 0–5)
GFR NON-AFRICAN AMERICAN: 34
GLUCOSE BLD-MCNC: 146 MG/DL (ref 74–109)
HCT VFR BLD CALC: 32.4 % (ref 37–47)
HEMOGLOBIN: 10.2 G/DL (ref 12–16)
LYMPHOCYTES ABSOLUTE: 0.7 K/UL (ref 1.1–4.5)
LYMPHOCYTES RELATIVE PERCENT: 7.8 % (ref 20–40)
MCH RBC QN AUTO: 29.7 PG (ref 27–31)
MCHC RBC AUTO-ENTMCNC: 31.5 G/DL (ref 33–37)
MCV RBC AUTO: 94.5 FL (ref 81–99)
MONOCYTES ABSOLUTE: 0.6 K/UL (ref 0–0.9)
MONOCYTES RELATIVE PERCENT: 6.7 % (ref 0–10)
NEUTROPHILS ABSOLUTE: 7.4 K/UL (ref 1.5–7.5)
NEUTROPHILS RELATIVE PERCENT: 84 % (ref 50–65)
PDW BLD-RTO: 12.4 % (ref 11.5–14.5)
PLATELET # BLD: 144 K/UL (ref 130–400)
PMV BLD AUTO: 12.1 FL (ref 9.4–12.3)
POTASSIUM REFLEX MAGNESIUM: 5 MMOL/L (ref 3.5–5)
RBC # BLD: 3.43 M/UL (ref 4.2–5.4)
SODIUM BLD-SCNC: 144 MMOL/L (ref 136–145)
WBC # BLD: 8.8 K/UL (ref 4.8–10.8)

## 2019-04-25 PROCEDURE — 6370000000 HC RX 637 (ALT 250 FOR IP): Performed by: ORTHOPAEDIC SURGERY

## 2019-04-25 PROCEDURE — 6370000000 HC RX 637 (ALT 250 FOR IP): Performed by: INTERNAL MEDICINE

## 2019-04-25 PROCEDURE — 2720000010 HC SURG SUPPLY STERILE: Performed by: ORTHOPAEDIC SURGERY

## 2019-04-25 PROCEDURE — 7100000000 HC PACU RECOVERY - FIRST 15 MIN: Performed by: ORTHOPAEDIC SURGERY

## 2019-04-25 PROCEDURE — 6360000002 HC RX W HCPCS: Performed by: ORTHOPAEDIC SURGERY

## 2019-04-25 PROCEDURE — 1210000000 HC MED SURG R&B

## 2019-04-25 PROCEDURE — 2580000003 HC RX 258: Performed by: ORTHOPAEDIC SURGERY

## 2019-04-25 PROCEDURE — 2500000003 HC RX 250 WO HCPCS: Performed by: NURSE ANESTHETIST, CERTIFIED REGISTERED

## 2019-04-25 PROCEDURE — 3600000014 HC SURGERY LEVEL 4 ADDTL 15MIN: Performed by: ORTHOPAEDIC SURGERY

## 2019-04-25 PROCEDURE — 3700000000 HC ANESTHESIA ATTENDED CARE: Performed by: ORTHOPAEDIC SURGERY

## 2019-04-25 PROCEDURE — 3209999900 FLUORO FOR SURGICAL PROCEDURES

## 2019-04-25 PROCEDURE — 3600000004 HC SURGERY LEVEL 4 BASE: Performed by: ORTHOPAEDIC SURGERY

## 2019-04-25 PROCEDURE — 2780000010 HC IMPLANT OTHER: Performed by: ORTHOPAEDIC SURGERY

## 2019-04-25 PROCEDURE — 80048 BASIC METABOLIC PNL TOTAL CA: CPT

## 2019-04-25 PROCEDURE — 99232 SBSQ HOSP IP/OBS MODERATE 35: CPT | Performed by: HOSPITALIST

## 2019-04-25 PROCEDURE — 7100000001 HC PACU RECOVERY - ADDTL 15 MIN: Performed by: ORTHOPAEDIC SURGERY

## 2019-04-25 PROCEDURE — C1713 ANCHOR/SCREW BN/BN,TIS/BN: HCPCS | Performed by: ORTHOPAEDIC SURGERY

## 2019-04-25 PROCEDURE — 2709999900 HC NON-CHARGEABLE SUPPLY: Performed by: ORTHOPAEDIC SURGERY

## 2019-04-25 PROCEDURE — 73502 X-RAY EXAM HIP UNI 2-3 VIEWS: CPT

## 2019-04-25 PROCEDURE — 2580000003 HC RX 258: Performed by: NURSE ANESTHETIST, CERTIFIED REGISTERED

## 2019-04-25 PROCEDURE — 0QS704Z REPOSITION LEFT UPPER FEMUR WITH INTERNAL FIXATION DEVICE, OPEN APPROACH: ICD-10-PCS | Performed by: ORTHOPAEDIC SURGERY

## 2019-04-25 PROCEDURE — 3700000001 HC ADD 15 MINUTES (ANESTHESIA): Performed by: ORTHOPAEDIC SURGERY

## 2019-04-25 PROCEDURE — 36415 COLL VENOUS BLD VENIPUNCTURE: CPT

## 2019-04-25 PROCEDURE — 2500000003 HC RX 250 WO HCPCS: Performed by: INTERNAL MEDICINE

## 2019-04-25 PROCEDURE — C1769 GUIDE WIRE: HCPCS | Performed by: ORTHOPAEDIC SURGERY

## 2019-04-25 PROCEDURE — 6360000002 HC RX W HCPCS: Performed by: NURSE ANESTHETIST, CERTIFIED REGISTERED

## 2019-04-25 PROCEDURE — 85025 COMPLETE CBC W/AUTO DIFF WBC: CPT

## 2019-04-25 DEVICE — SCREW BNE L36MM DIA5MM TIB LT GRN TI ST CANN LOK FULL THRD: Type: IMPLANTABLE DEVICE | Site: FEMUR | Status: FUNCTIONAL

## 2019-04-25 DEVICE — IMPLANTABLE DEVICE: Type: IMPLANTABLE DEVICE | Site: FEMUR | Status: FUNCTIONAL

## 2019-04-25 RX ORDER — OXYCODONE HYDROCHLORIDE AND ACETAMINOPHEN 5; 325 MG/1; MG/1
2 TABLET ORAL EVERY 4 HOURS PRN
Status: DISCONTINUED | OUTPATIENT
Start: 2019-04-25 | End: 2019-04-27 | Stop reason: HOSPADM

## 2019-04-25 RX ORDER — HYDRALAZINE HYDROCHLORIDE 20 MG/ML
5 INJECTION INTRAMUSCULAR; INTRAVENOUS EVERY 10 MIN PRN
Status: DISCONTINUED | OUTPATIENT
Start: 2019-04-25 | End: 2019-04-25 | Stop reason: HOSPADM

## 2019-04-25 RX ORDER — MORPHINE SULFATE 2 MG/ML
2 INJECTION, SOLUTION INTRAMUSCULAR; INTRAVENOUS EVERY 5 MIN PRN
Status: DISCONTINUED | OUTPATIENT
Start: 2019-04-25 | End: 2019-04-25 | Stop reason: HOSPADM

## 2019-04-25 RX ORDER — GLYCOPYRROLATE 0.2 MG/ML
INJECTION INTRAMUSCULAR; INTRAVENOUS PRN
Status: DISCONTINUED | OUTPATIENT
Start: 2019-04-25 | End: 2019-04-25 | Stop reason: SDUPTHER

## 2019-04-25 RX ORDER — DIPHENHYDRAMINE HYDROCHLORIDE 50 MG/ML
12.5 INJECTION INTRAMUSCULAR; INTRAVENOUS
Status: DISCONTINUED | OUTPATIENT
Start: 2019-04-25 | End: 2019-04-25 | Stop reason: HOSPADM

## 2019-04-25 RX ORDER — POLYETHYLENE GLYCOL 3350 17 G/17G
17 POWDER, FOR SOLUTION ORAL DAILY PRN
Status: DISCONTINUED | OUTPATIENT
Start: 2019-04-25 | End: 2019-04-27 | Stop reason: HOSPADM

## 2019-04-25 RX ORDER — OXYCODONE HYDROCHLORIDE AND ACETAMINOPHEN 5; 325 MG/1; MG/1
1 TABLET ORAL EVERY 4 HOURS PRN
Status: DISCONTINUED | OUTPATIENT
Start: 2019-04-25 | End: 2019-04-27 | Stop reason: HOSPADM

## 2019-04-25 RX ORDER — METOCLOPRAMIDE HYDROCHLORIDE 5 MG/ML
10 INJECTION INTRAMUSCULAR; INTRAVENOUS
Status: DISCONTINUED | OUTPATIENT
Start: 2019-04-25 | End: 2019-04-25 | Stop reason: HOSPADM

## 2019-04-25 RX ORDER — CYCLOBENZAPRINE HCL 5 MG
5 TABLET ORAL 2 TIMES DAILY PRN
Status: DISCONTINUED | OUTPATIENT
Start: 2019-04-25 | End: 2019-04-27 | Stop reason: HOSPADM

## 2019-04-25 RX ORDER — SODIUM CHLORIDE 0.9 % (FLUSH) 0.9 %
10 SYRINGE (ML) INJECTION PRN
Status: DISCONTINUED | OUTPATIENT
Start: 2019-04-25 | End: 2019-04-27 | Stop reason: HOSPADM

## 2019-04-25 RX ORDER — ONDANSETRON 2 MG/ML
INJECTION INTRAMUSCULAR; INTRAVENOUS PRN
Status: DISCONTINUED | OUTPATIENT
Start: 2019-04-25 | End: 2019-04-25 | Stop reason: SDUPTHER

## 2019-04-25 RX ORDER — AMLODIPINE BESYLATE 5 MG/1
5 TABLET ORAL DAILY
Status: DISCONTINUED | OUTPATIENT
Start: 2019-04-25 | End: 2019-04-25

## 2019-04-25 RX ORDER — PANTOPRAZOLE SODIUM 40 MG/1
40 TABLET, DELAYED RELEASE ORAL
Status: DISCONTINUED | OUTPATIENT
Start: 2019-04-25 | End: 2019-04-27 | Stop reason: HOSPADM

## 2019-04-25 RX ORDER — SODIUM CHLORIDE, SODIUM LACTATE, POTASSIUM CHLORIDE, CALCIUM CHLORIDE 600; 310; 30; 20 MG/100ML; MG/100ML; MG/100ML; MG/100ML
INJECTION, SOLUTION INTRAVENOUS CONTINUOUS
Status: DISCONTINUED | OUTPATIENT
Start: 2019-04-25 | End: 2019-04-25

## 2019-04-25 RX ORDER — ENALAPRILAT 2.5 MG/2ML
1.25 INJECTION INTRAVENOUS
Status: DISCONTINUED | OUTPATIENT
Start: 2019-04-25 | End: 2019-04-25 | Stop reason: HOSPADM

## 2019-04-25 RX ORDER — ATORVASTATIN CALCIUM 20 MG/1
20 TABLET, FILM COATED ORAL NIGHTLY
Status: DISCONTINUED | OUTPATIENT
Start: 2019-04-25 | End: 2019-04-27 | Stop reason: HOSPADM

## 2019-04-25 RX ORDER — PROPOFOL 10 MG/ML
INJECTION, EMULSION INTRAVENOUS PRN
Status: DISCONTINUED | OUTPATIENT
Start: 2019-04-25 | End: 2019-04-25 | Stop reason: SDUPTHER

## 2019-04-25 RX ORDER — KETAMINE HYDROCHLORIDE 100 MG/ML
INJECTION, SOLUTION INTRAMUSCULAR; INTRAVENOUS PRN
Status: DISCONTINUED | OUTPATIENT
Start: 2019-04-25 | End: 2019-04-25 | Stop reason: SDUPTHER

## 2019-04-25 RX ORDER — DOCUSATE SODIUM 100 MG/1
100 CAPSULE, LIQUID FILLED ORAL 2 TIMES DAILY
Status: DISCONTINUED | OUTPATIENT
Start: 2019-04-25 | End: 2019-04-27 | Stop reason: HOSPADM

## 2019-04-25 RX ORDER — SODIUM CHLORIDE 0.9 % (FLUSH) 0.9 %
10 SYRINGE (ML) INJECTION EVERY 12 HOURS SCHEDULED
Status: DISCONTINUED | OUTPATIENT
Start: 2019-04-25 | End: 2019-04-27 | Stop reason: HOSPADM

## 2019-04-25 RX ORDER — LABETALOL HYDROCHLORIDE 5 MG/ML
5 INJECTION, SOLUTION INTRAVENOUS EVERY 10 MIN PRN
Status: DISCONTINUED | OUTPATIENT
Start: 2019-04-25 | End: 2019-04-25 | Stop reason: HOSPADM

## 2019-04-25 RX ORDER — LOSARTAN POTASSIUM 100 MG/1
100 TABLET ORAL DAILY
Status: DISCONTINUED | OUTPATIENT
Start: 2019-04-25 | End: 2019-04-27 | Stop reason: HOSPADM

## 2019-04-25 RX ORDER — MEPERIDINE HYDROCHLORIDE 50 MG/ML
12.5 INJECTION INTRAMUSCULAR; INTRAVENOUS; SUBCUTANEOUS EVERY 5 MIN PRN
Status: DISCONTINUED | OUTPATIENT
Start: 2019-04-25 | End: 2019-04-25 | Stop reason: HOSPADM

## 2019-04-25 RX ORDER — ALBUTEROL SULFATE 2.5 MG/3ML
2.5 SOLUTION RESPIRATORY (INHALATION) EVERY 6 HOURS PRN
Status: DISCONTINUED | OUTPATIENT
Start: 2019-04-25 | End: 2019-04-27 | Stop reason: HOSPADM

## 2019-04-25 RX ORDER — BISACODYL 10 MG
10 SUPPOSITORY, RECTAL RECTAL DAILY PRN
Status: DISCONTINUED | OUTPATIENT
Start: 2019-04-25 | End: 2019-04-27 | Stop reason: HOSPADM

## 2019-04-25 RX ORDER — ROCURONIUM BROMIDE 10 MG/ML
INJECTION, SOLUTION INTRAVENOUS PRN
Status: DISCONTINUED | OUTPATIENT
Start: 2019-04-25 | End: 2019-04-25 | Stop reason: SDUPTHER

## 2019-04-25 RX ORDER — DEXAMETHASONE SODIUM PHOSPHATE 10 MG/ML
INJECTION INTRAMUSCULAR; INTRAVENOUS PRN
Status: DISCONTINUED | OUTPATIENT
Start: 2019-04-25 | End: 2019-04-25 | Stop reason: SDUPTHER

## 2019-04-25 RX ORDER — EPHEDRINE SULFATE 50 MG/ML
INJECTION, SOLUTION INTRAVENOUS PRN
Status: DISCONTINUED | OUTPATIENT
Start: 2019-04-25 | End: 2019-04-25 | Stop reason: SDUPTHER

## 2019-04-25 RX ORDER — MORPHINE SULFATE 2 MG/ML
4 INJECTION, SOLUTION INTRAMUSCULAR; INTRAVENOUS EVERY 5 MIN PRN
Status: DISCONTINUED | OUTPATIENT
Start: 2019-04-25 | End: 2019-04-25 | Stop reason: HOSPADM

## 2019-04-25 RX ORDER — AMLODIPINE BESYLATE 10 MG/1
10 TABLET ORAL DAILY
Status: DISCONTINUED | OUTPATIENT
Start: 2019-04-25 | End: 2019-04-27 | Stop reason: HOSPADM

## 2019-04-25 RX ORDER — LEVOTHYROXINE SODIUM 0.07 MG/1
75 TABLET ORAL DAILY
Status: DISCONTINUED | OUTPATIENT
Start: 2019-04-25 | End: 2019-04-27 | Stop reason: HOSPADM

## 2019-04-25 RX ORDER — SODIUM CHLORIDE, SODIUM LACTATE, POTASSIUM CHLORIDE, CALCIUM CHLORIDE 600; 310; 30; 20 MG/100ML; MG/100ML; MG/100ML; MG/100ML
INJECTION, SOLUTION INTRAVENOUS CONTINUOUS PRN
Status: DISCONTINUED | OUTPATIENT
Start: 2019-04-25 | End: 2019-04-25 | Stop reason: SDUPTHER

## 2019-04-25 RX ORDER — METOPROLOL TARTRATE 5 MG/5ML
2.5 INJECTION INTRAVENOUS EVERY 6 HOURS
Status: DISCONTINUED | OUTPATIENT
Start: 2019-04-25 | End: 2019-04-25

## 2019-04-25 RX ORDER — FENTANYL CITRATE 50 UG/ML
INJECTION, SOLUTION INTRAMUSCULAR; INTRAVENOUS PRN
Status: DISCONTINUED | OUTPATIENT
Start: 2019-04-25 | End: 2019-04-25 | Stop reason: SDUPTHER

## 2019-04-25 RX ORDER — LIDOCAINE HYDROCHLORIDE 10 MG/ML
INJECTION, SOLUTION EPIDURAL; INFILTRATION; INTRACAUDAL; PERINEURAL PRN
Status: DISCONTINUED | OUTPATIENT
Start: 2019-04-25 | End: 2019-04-25 | Stop reason: SDUPTHER

## 2019-04-25 RX ORDER — MORPHINE SULFATE 2 MG/ML
2 INJECTION, SOLUTION INTRAMUSCULAR; INTRAVENOUS
Status: DISCONTINUED | OUTPATIENT
Start: 2019-04-25 | End: 2019-04-27 | Stop reason: HOSPADM

## 2019-04-25 RX ORDER — HYDRALAZINE HYDROCHLORIDE 20 MG/ML
5 INJECTION INTRAMUSCULAR; INTRAVENOUS EVERY 4 HOURS PRN
Status: DISCONTINUED | OUTPATIENT
Start: 2019-04-25 | End: 2019-04-27 | Stop reason: HOSPADM

## 2019-04-25 RX ORDER — METOPROLOL TARTRATE 50 MG/1
100 TABLET, FILM COATED ORAL 2 TIMES DAILY
Status: DISCONTINUED | OUTPATIENT
Start: 2019-04-25 | End: 2019-04-27 | Stop reason: HOSPADM

## 2019-04-25 RX ORDER — GABAPENTIN 100 MG/1
100 CAPSULE ORAL 3 TIMES DAILY
Status: DISCONTINUED | OUTPATIENT
Start: 2019-04-25 | End: 2019-04-27 | Stop reason: HOSPADM

## 2019-04-25 RX ADMIN — ROCURONIUM BROMIDE 40 MG: 10 INJECTION INTRAVENOUS at 12:21

## 2019-04-25 RX ADMIN — DEXAMETHASONE SODIUM PHOSPHATE 10 MG: 10 INJECTION INTRAMUSCULAR; INTRAVENOUS at 12:26

## 2019-04-25 RX ADMIN — MORPHINE SULFATE 2 MG: 2 INJECTION, SOLUTION INTRAMUSCULAR; INTRAVENOUS at 14:37

## 2019-04-25 RX ADMIN — METOPROLOL TARTRATE 100 MG: 50 TABLET ORAL at 20:35

## 2019-04-25 RX ADMIN — METOPROLOL TARTRATE 2.5 MG: 1 INJECTION, SOLUTION INTRAVENOUS at 07:44

## 2019-04-25 RX ADMIN — Medication 2 G: at 20:34

## 2019-04-25 RX ADMIN — WATER 1 G: 1 INJECTION INTRAMUSCULAR; INTRAVENOUS; SUBCUTANEOUS at 12:32

## 2019-04-25 RX ADMIN — ONDANSETRON HYDROCHLORIDE 4 MG: 2 INJECTION, SOLUTION INTRAMUSCULAR; INTRAVENOUS at 12:57

## 2019-04-25 RX ADMIN — ATORVASTATIN CALCIUM 20 MG: 20 TABLET, FILM COATED ORAL at 20:35

## 2019-04-25 RX ADMIN — METOPROLOL TARTRATE 100 MG: 50 TABLET ORAL at 10:27

## 2019-04-25 RX ADMIN — PROPOFOL 100 MG: 10 INJECTION, EMULSION INTRAVENOUS at 12:20

## 2019-04-25 RX ADMIN — CYCLOBENZAPRINE HYDROCHLORIDE 5 MG: 5 TABLET, FILM COATED ORAL at 16:17

## 2019-04-25 RX ADMIN — OXYCODONE HYDROCHLORIDE AND ACETAMINOPHEN 2 TABLET: 5; 325 TABLET ORAL at 16:17

## 2019-04-25 RX ADMIN — NEOSTIGMINE METHYLSULFATE 4 MG: 1 INJECTION, SOLUTION INTRAMUSCULAR; INTRAVENOUS; SUBCUTANEOUS at 13:06

## 2019-04-25 RX ADMIN — APIXABAN 2.5 MG: 2.5 TABLET, FILM COATED ORAL at 20:34

## 2019-04-25 RX ADMIN — EPHEDRINE SULFATE 10 MG: 50 INJECTION, SOLUTION INTRAMUSCULAR; INTRAVENOUS; SUBCUTANEOUS at 12:44

## 2019-04-25 RX ADMIN — EPHEDRINE SULFATE 10 MG: 50 INJECTION, SOLUTION INTRAMUSCULAR; INTRAVENOUS; SUBCUTANEOUS at 12:37

## 2019-04-25 RX ADMIN — Medication 30 MG: at 12:25

## 2019-04-25 RX ADMIN — GLYCOPYRROLATE 0.6 MG: 0.2 INJECTION INTRAMUSCULAR; INTRAVENOUS at 13:06

## 2019-04-25 RX ADMIN — FENTANYL CITRATE 100 MCG: 50 INJECTION INTRAMUSCULAR; INTRAVENOUS at 12:20

## 2019-04-25 RX ADMIN — SODIUM CHLORIDE, POTASSIUM CHLORIDE, SODIUM LACTATE AND CALCIUM CHLORIDE: 600; 310; 30; 20 INJECTION, SOLUTION INTRAVENOUS at 11:34

## 2019-04-25 RX ADMIN — LIDOCAINE HYDROCHLORIDE 50 MG: 10 INJECTION, SOLUTION EPIDURAL; INFILTRATION; INTRACAUDAL; PERINEURAL at 12:20

## 2019-04-25 RX ADMIN — DOCUSATE SODIUM 100 MG: 100 CAPSULE, LIQUID FILLED ORAL at 20:35

## 2019-04-25 RX ADMIN — SODIUM CHLORIDE, SODIUM LACTATE, POTASSIUM CHLORIDE, AND CALCIUM CHLORIDE: 600; 310; 30; 20 INJECTION, SOLUTION INTRAVENOUS at 12:14

## 2019-04-25 RX ADMIN — GABAPENTIN 100 MG: 100 CAPSULE ORAL at 20:35

## 2019-04-25 ASSESSMENT — PAIN SCALES - GENERAL
PAINLEVEL_OUTOF10: 10
PAINLEVEL_OUTOF10: 0
PAINLEVEL_OUTOF10: 10

## 2019-04-25 ASSESSMENT — LIFESTYLE VARIABLES: SMOKING_STATUS: 0

## 2019-04-25 NOTE — CONSULTS
Orthopaedic Inpatient Consultation    NAME:  Tania Mckeon   : 1940  MRN: [id number]    2019  7:04 PM    Requesting Physician: Eula Diaz:  left hip pain    HISTORY OF PRESENT ILLNESS:   The patient is a 70 y.o. female who was visiting a family member in the hospital and experienced a mechanical fall onto her left side resulting in an intertrochanteric left femur fracture. Pain is located in the left hip and rated a 3/5, constant, dull, worse with movement, better with rest and medication. There are no associated symptoms. 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 now feels like her Sphinctor muscle is very tight.  COLONOSCOPY      CRANIAL LESION RESECTION      blood clot    EYE SURGERY Left 2017    HIP SURGERY Right     REPAIR, [address]    INNER EAR SURGERY Right     EARDRUM REPAIR SURGERY, [facility] [address] [place] [facility]    OTHER SURGICAL HISTORY      TUBES TIED AT AGE 38    [place] EGD TRANSORAL BIOPSY SINGLE/MULTIPLE N/A 3/7/2017    Dr CHEL Delgado-Gastritis   Olinda Jenkins GASTROINTESTINAL ENDOSCOPY  2017       Current Medications:   Prior to Admission medications    Medication Sig Start Date End Date Taking?  Authorizing Provider   levothyroxine (SYNTHROID) 75 MCG tablet Take 1 tablet by mouth daily 19  Yes Kristal Lazar MD   metoprolol (LOPRESSOR) 100 MG tablet TAKE 1 TABLET TWICE A DAY 1/15/19  Yes Kristal Lazar MD   losartan (COZAAR) 100 MG tablet TAKE 1 TABLET DAILY 1/15/19  Yes Jose Lo MD   atorvastatin (LIPITOR) 20 MG tablet TAKE 1 TABLET NIGHTLY 1/15/19  Yes Jose Lo MD   ULORIC 40 MG TABS tablet TAKE 1 TABLET DAILY 10/16/18  Yes Jose Lo MD   amLODIPine (NORVASC) 5 MG tablet TAKE 1 TABLET DAILY 8/21/18  Yes Jose Lo MD   B Complex-C (SUPER B COMPLEX PO) Take by mouth   Yes Historical Provider, MD   CALCIUM PO Take by mouth   Yes Historical Provider, MD   aspirin 81 MG chewable tablet Take 1 tablet by mouth daily 8/8/16  Yes APRYL Chowdhury   Multiple Vitamins-Minerals (WOMENS 50+ [address] VITAMIN/MIN PO) Take by mouth   Yes Historical Provider, MD   gabapentin (NEURONTIN) 100 MG capsule Take 1 capsule by mouth 3 times daily for 180 days. Intended supply: 30 days. 2/18/19 8/17/19  Missy Flores MD   chlorthalidone (HYGROTON) 25 MG tablet TAKE ONE TABLET BY MOUTH DAILY  Patient taking differently: daily as needed TAKE ONE TABLET BY MOUTH DAILY 1/14/19   Jose Lo MD   omeprazole (PRILOSEC) 40 MG delayed release capsule TAKE ONE CAPSULE BY MOUTH DAILY -SWALLOW WHOLE. DO NOT CHEW OR CRUSH. 1/14/19   Jose Lo MD   cyclobenzaprine (FLEXERIL) 5 MG tablet Take 1 tablet by mouth 2 times daily as needed for Muscle spasms 10/16/18   Jose Lo MD   Nebulizers Clabe Kalyn COMPACT MINI NEBULIZER) [facility] As directed 12/13/17   Jose Lo MD   albuterol (PROVENTIL) (2.5 MG/3ML) 0.083% nebulizer solution Take 3 mLs by nebulization every 6 hours as needed for Wheezing 12/13/17   Jose Lo MD   Misc Natural Products ([place] COMPLEX/MSM PO) Take by mouth    Historical Provider, MD   Probiotic Product (PROBIOTIC DAILY PO) Take by mouth    Historical Provider, MD   Biotin 5000 MCG TABS Take by mouth daily as needed     Historical Provider, MD       Allergies:  Phenergan [promethazine]    Social History:   Social History     Socioeconomic History    Marital status:       Spouse name: Not on file    Number of children: Not on file    Years of education: Not on file    Highest education level: Not on file   Occupational History    Not on file   Social Needs    Financial resource strain: Not on file    Food insecurity:     Worry: Not on file     Inability: Not on file    Transportation needs:     Medical: Not on file     Non-medical: Not on file   Tobacco Use    Smoking status: Former Smoker     Packs/day: 1.00     Years: 12.00     Pack years: 12.00     Last attempt to quit: 1960     Years since quittin.9    Smokeless tobacco: Never Used   Substance and Sexual Activity    Alcohol use: No    Drug use: No    Sexual activity: Not on file   Lifestyle    Physical activity:     Days per week: Not on file     Minutes per session: Not on file    Stress: Not on file   Relationships    Social connections:     Talks on phone: Not on file     Gets together: Not on file     Attends Orthodox service: Not on file     Active member of club or organization: Not on file     Attends meetings of clubs or organizations: Not on file     Relationship status: Not on file    Intimate partner violence:     Fear of current or ex partner: Not on file     Emotionally abused: Not on file     Physically abused: Not on file     Forced sexual activity: Not on file   Other Topics Concern    Not on file   Social History Narrative    Not on file       Family History:   Family History   Problem Relation Age of Onset    Diabetes Mother     Stroke Mother     Diabetes Sister     Stroke Sister     Diabetes Brother     Stroke Brother     Colon Cancer Neg Hx     Colon Polyps Neg Hx     Esophageal Cancer Neg Hx     Liver Cancer Neg Hx     Liver Disease Neg Hx     Stomach Cancer Neg Hx     Rectal Cancer Neg Hx        REVIEW OF SYSTEMS:  14 point review of systems has been reviewed from the patient's emergency room visit, reviewed with the patient on today's date with no new changes.     PHYSICAL EXAM:      Physical Examination:  Vitals:   Vitals:    04/24/19 2132 04/24/19 2203 04/25/19 0313 04/25/19 0635   BP: (!) 184/81 (!) 176/76 (!) 178/86 (!) 162/78   Pulse: 72 77 84 79   Resp: 16 14 14 16   Temp: 97.2 °F (36.2 °C) 99.8 °F (37.7 °C) 99.2 °F (37.3 °C) 98.2 °F (36.8 °C)   TempSrc:  Temporal Temporal Temporal   SpO2: 95% 90% 100% 98%   Weight:  152 lb 3.2 oz (69 kg)     Height:  5' 6\" (1.676 m)       General:  Appears stated age, no distress. Orientation:  Alert and oriented to time, place, and person. Mood and Affect:  Cooperative and pleasant. Gait:  Resting comfortably in bed. Cardiovascular:  Symmetric 1-2 plus pulses in upper and lower extremities. Lymph:  No cervical or inguinal lymphadenopathy noted. Sensation:  Grossly intact to light touch. DTR:  Normal, no pathologic reflexes. Coordination/balance:  Normal    Musculoskeletal:  Right upper extremity exam:  There is no tenderness to palpation about the shoulder, elbow, wrist or hand. Unrestricted full function motion is present. Stability is normal with provocative tests, 5/5 strength, and skin is normal.      Left upper extremity exam:  There is no tenderness to palpation about the shoulder, elbow, wrist or hand. Unrestricted full function motion is present. Stability is normal with provocative tests, 5/5 strength, and skin is normal.     Right lower extremity exam:  There is no tenderness to palpation about the hip, knee, ankle or foot. Unrestricted full function motion is present. Stability is normal with provocative tests, 5/5 strength, and skin is normal.     Left lower extremity exam:  There is no tenderness to palpation about the knee, ankle or foot, but there is obvious pain with palpation about the left hip. There is pain with any attempted active or passive range of motion to the left hip or with supine log roll. The overlying skin is intact and the surrounding muscle compartments are soft and compressible.     DATA:    CBC with Differential: Lab Results   Component Value Date    WBC 8.8 04/25/2019    RBC 3.43 04/25/2019    HGB 10.2 04/25/2019    HCT 32.4 04/25/2019     04/25/2019    MCV 94.5 04/25/2019    MCH 29.7 04/25/2019    MCHC 31.5 04/25/2019    RDW 12.4 04/25/2019    LYMPHOPCT 7.8 04/25/2019    MONOPCT 6.7 04/25/2019    BASOPCT 0.2 04/25/2019    MONOSABS 0.60 04/25/2019    LYMPHSABS 0.7 04/25/2019    EOSABS 0.00 04/25/2019    BASOSABS 0.00 04/25/2019     CMP:    Lab Results   Component Value Date     04/25/2019    K 5.0 04/25/2019     04/25/2019    CO2 26 04/25/2019    BUN 35 04/25/2019    CREATININE 1.5 04/25/2019    LABGLOM 34 04/25/2019    GLUCOSE 146 04/25/2019    PROT 6.9 04/24/2019    CALCIUM 8.9 04/25/2019    BILITOT 0.6 04/24/2019    ALKPHOS 70 04/24/2019    AST 21 04/24/2019    ALT 17 04/24/2019     BMP:    Lab Results   Component Value Date     04/25/2019    K 5.0 04/25/2019     04/25/2019    CO2 26 04/25/2019    BUN 35 04/25/2019    CREATININE 1.5 04/25/2019    CALCIUM 8.9 04/25/2019    LABGLOM 34 04/25/2019    GLUCOSE 146 04/25/2019         Radiology: I have reviewed the radiology images listed below and agree with the findings of the interpreting radiologist(s). Xr Chest Portable    Result Date: 4/24/2019  EXAMINATION: XR CHEST PORTABLE 4/24/2019 8:23 PM HISTORY: Fall with left hip pain COMPARISON: 12/18/2018 FINDINGS: Heart appears normal in size. Aorta is mildly tortuous. The lungs appear clear without focal consolidation or effusion. No pneumothorax is appreciated. The pulmonary vasculature appears grossly normal. There has been prior granulomatous exposure. An old right-sided rib fracture is suspected. No evidence of acute cardiopulmonary process.  Signed by Dr Minerva Mckeon on 4/24/2019 8:27 PM    Xr Hip 2-3 Vw W Pelvis Left    Result Date: 4/24/2019  EXAMINATION: XR HIP 2-3 VW W PELVIS LEFT 4/24/2019 8:20 PM HISTORY: Left hip pain after fall COMPARISON: 12/13/2018 FINDINGS: The pelvic ring appears intact. The patient appears osteopenic. Femoral heads appear appropriately located in the acetabula bilaterally. There is an acute intertrochanteric fracture on the left. Crosstable lateral is essentially nondiagnostic. Degenerative changes are seen in the lower lumbar spine. Acute intertrochanteric fracture of the left hip.  Signed by Dr Bruce Trujillo on 4/24/2019 8:22 PM    --------------------------------------------------------------------------------------------------------------------    Assessment: Left intertrochanteric femur fracture     Plan:  1) to OR for trochanteric entry femoral nailing of the left femur - we discussed risks, benefits, and alternatives and patient wishes to proceed  2) Admit postop for pain control, PT/OT  3) nothing by mouth presently and toe-touch weightbearing ×6 weeks postoperatively     Electronically signed by Trinidad Mckee MD on 4/25/2019 at 7:51 AM

## 2019-04-25 NOTE — ED PROVIDER NOTES
[address] [facility] EMERGENCY DEPT  eMERGENCY dEPARTMENT eNCOUnter      Pt Name: Lamar Grant  MRN: [id number]  [place] 1940  Date of evaluation: 4/24/2019  Provider: APRYL Virgen    CHIEF COMPLAINT       Chief Complaint   Patient presents with    Fall    Hip Pain         HISTORY OF PRESENT ILLNESS   (Location/Symptom, Timing/Onset,Context/Setting, Quality, Duration, Modifying Factors, Severity)  Note limiting factors. Lamar Grant is a 70 y.o. female who presents to the emergency department with complaints of left hip pain s/p fall. She was here visiting family and tripped and fell. She denies any other injury. No head injury or neck pain. She is on no blood thinners. Pain is with any movements. She states she couldn't get up. HPI    NursingNotes were reviewed. REVIEW OF SYSTEMS    (2-9 systems for level 4, 10 or more for level 5)     Review of Systems   Constitutional: Negative for activity change, appetite change, fatigue, fever and unexpected weight change. HENT: Negative for congestion, hearing loss, rhinorrhea, sinus pressure, sore throat and trouble swallowing. Eyes: Negative for visual disturbance. Respiratory: Negative for cough and shortness of breath. Cardiovascular: Negative for chest pain, palpitations and leg swelling. Gastrointestinal: Negative for abdominal pain, constipation, diarrhea, nausea and vomiting. Endocrine: Negative for cold intolerance and heat intolerance. Genitourinary: Negative for flank pain, menstrual problem, pelvic pain, urgency and vaginal discharge. Musculoskeletal: Negative for arthralgias. Left hip pain   Skin: Negative for rash. Neurological: Negative for headaches. Psychiatric/Behavioral: Negative for dysphoric mood and sleep disturbance. The patient is not nervous/anxious. A complete review of systems was performed and is negative except as noted above in the HPI.        PAST MEDICAL HISTORY     Past Medical History: Diagnosis Date    Blindness 2014    left eye due to sroke in 2013- peter     Chronic GERD 4/26/2017    Chronic kidney disease     dr Diandra Yo pain     dr Ospina Yue Headache     Hypertension     Hypothyroidism     Osteoarthritis     BILATERAL FEET    Osteoporosis     refuse treatment     Stroke [facility]) 2013    eventually lost left eye sight due to this- dr Jauregui Given       Past Surgical History:   Procedure Laterality Date    BLADDER REPAIR  2011    bladder fell down. now feels like her Sphinctor muscle is very tight.  COLONOSCOPY  2005    CRANIAL LESION RESECTION  1981    blood clot    EYE SURGERY Left 02/02/2017    HIP SURGERY Right 2011    REPAIR, [address]    INNER EAR SURGERY Right 1968    [address]ARDRUM [facility] SURGERY, [address] [facility] [place] [facility]    OTHER SURGICAL HISTORY      TUBES TIED AT AGE 38    [place] EGD TRANSORAL BIOPSY SINGLE/MULTIPLE N/A 3/7/2017    Dr Taran Delgado-[address] GASTROINTESTINAL ENDOSCOPY  03/07/2017         CURRENT MEDICATIONS       Previous Medications    ALBUTEROL (PROVENTIL) (2.5 MG/3ML) 0.083% NEBULIZER SOLUTION    Take 3 mLs by nebulization every 6 hours as needed for Wheezing    AMLODIPINE (NORVASC) 5 MG TABLET    TAKE 1 TABLET DAILY    ASPIRIN 81 MG CHEWABLE TABLET    Take 1 tablet by mouth daily    ATORVASTATIN (LIPITOR) 20 MG TABLET    TAKE 1 TABLET NIGHTLY    B COMPLEX-C (SUPER B COMPLEX PO)    Take by mouth    BIOTIN 5000 MCG TABS    Take by mouth daily as needed     CALCIUM PO    Take by mouth    CHLORTHALIDONE (HYGROTON) 25 MG TABLET    TAKE ONE TABLET BY MOUTH DAILY    CYCLOBENZAPRINE (FLEXERIL) 5 MG TABLET    Take 1 tablet by mouth 2 times daily as needed for Muscle spasms    GABAPENTIN (NEURONTIN) 100 MG CAPSULE    Take 1 capsule by mouth 3 times daily for 180 days. Intended supply: 30 days.     LEVOTHYROXINE (SYNTHROID) 75 MCG TABLET    Take 1 tablet by mouth daily    LOSARTAN (COZAAR) 100 MG TABLET    TAKE 1 TABLET DAILY    METOPROLOL (LOPRESSOR) 100 MG TABLET    TAKE 1 TABLET TWICE A DAY    MISC NATURAL PRODUCTS (GLUCOSAMINE CHOND COMPLEX/MSM PO)    Take by mouth    MULTIPLE VITAMINS-MINERALS (WOMENS 50+ MULTI VITAMIN/MIN PO)    Take by mouth    NEBULIZERS ([place] COMPACT MINI NEBULIZER) MISC    As directed    OMEPRAZOLE (PRILOSEC) 40 MG DELAYED RELEASE CAPSULE    TAKE ONE CAPSULE BY MOUTH DAILY -SWALLOW WHOLE. DO NOT CHEW OR CRUSH. PROBIOTIC PRODUCT (PROBIOTIC DAILY PO)    Take by mouth    ULORIC 40 MG TABS TABLET    TAKE 1 TABLET DAILY       ALLERGIES     Phenergan [promethazine]    FAMILY HISTORY       Family History   Problem Relation Age of Onset    Diabetes Mother     Stroke Mother     Diabetes Sister     Stroke Sister     Diabetes Brother     Stroke Brother     Colon Cancer Neg Hx     Colon Polyps Neg Hx     Esophageal Cancer Neg Hx     Liver Cancer Neg Hx     Liver Disease Neg Hx     Stomach Cancer Neg Hx     Rectal Cancer Neg Hx           SOCIAL HISTORY       Social History     Socioeconomic History    Marital status:       Spouse name: None    Number of children: None    Years of education: None    Highest education level: None   Occupational History    None   Social Needs    Financial resource strain: None    Food insecurity:     Worry: None     Inability: None    Transportation needs:     Medical: None     Non-medical: None   Tobacco Use    Smoking status: Former Smoker     Packs/day: 1.00     Years: 12.00     Pack years: 12.00     Last attempt to quit: 1960     Years since quittin.9    Smokeless tobacco: Never Used   Substance and Sexual Activity    Alcohol use: No    Drug use: No    Sexual activity: None   Lifestyle    Physical activity:     Days per week: None     Minutes per session: None    Stress: None   Relationships    Social connections:     Talks on phone: None     Gets together: None     Attends Worship service: None     Active member of club or organization: None     Attends meetings of clubs or organizations: None     Relationship status: None    Intimate partner violence:     Fear of current or ex partner: None     Emotionally abused: None     Physically abused: None     Forced sexual activity: None   Other Topics Concern    None   Social History Narrative    None       SCREENINGS    [place] Coma Scale  Eye Opening: Spontaneous  Best Verbal Response: Oriented  Best Motor Response: Obeys commands  [place] Coma Scale Score: 15        PHYSICAL EXAM    (up to 7 for level 4, 8 or more for level 5)     ED Triage Vitals [04/24/19 1807]   BP Temp Temp Source Pulse Resp SpO2 Height Weight   (!) 189/96 97.2 °F (36.2 °C) Temporal 72 18 96 % 5' 6\" (1.676 m) 153 lb (69.4 kg)       Physical Exam   Constitutional: She is oriented to person, place, and time. She appears well-developed and well-nourished. HENT:   Head: Normocephalic and atraumatic. Neck: Normal range of motion. Neck supple. Cardiovascular: Normal rate, regular rhythm, normal heart sounds and intact distal pulses. Pulmonary/Chest: Effort normal and breath sounds normal.   Abdominal: Soft. Bowel sounds are normal. She exhibits no distension and no mass. There is no tenderness. There is no guarding. Musculoskeletal:        Left hip: She exhibits decreased range of motion, tenderness, bony tenderness and deformity (left foot externally rotated). Neurological: She is alert and oriented to person, place, and time. DIAGNOSTIC RESULTS     EKG: All EKG's are interpreted by the Emergency Department Physician who either signs or Co-signs this chart in the absence of a cardiologist.    2041 - sinus rhythm without ectopy, ST or T wave changes.  Rate is 75    RADIOLOGY:   Non-plain film images such as CT, Ultrasound and MRI are read by the radiologist. Plainradiographic images are visualized and preliminarily interpreted by the emergency physician with the below mis-transcribed.)    Purnima Almaraz, APRN (electronically signed)  [address] [facility], APRN  04/24/19 2122

## 2019-04-25 NOTE — PROGRESS NOTES
HOSPITAL MEDICINE  - PROGRESS NOTE    Admit Date: 4/24/2019         CC:  Left hip pain    Subjective: pain controlled     Objective: no distress    Diet: Dietary Nutrition Supplements: Standard High Calorie Oral Supplement  DIET CARB CONTROL;  Pain is:Mild  Nausea:None  Bowel Movement/Flatus no    Data:   Scheduled Meds: Reviewed  Current Facility-Administered Medications   Medication Dose Route Frequency Provider Last Rate Last Dose    atorvastatin (LIPITOR) tablet 20 mg  20 mg Oral Nightly Jace Vazquez MD        levothyroxine (SYNTHROID) tablet 75 mcg  75 mcg Oral Daily Jace Vazquez MD   Stopped at 04/25/19 0746    losartan (COZAAR) tablet 100 mg  100 mg Oral Daily Jace Vazquez MD   Stopped at 04/25/19 0746    metoprolol tartrate (LOPRESSOR) tablet 100 mg  100 mg Oral BID Jace Vazquez MD   100 mg at 04/25/19 1027    pantoprazole (PROTONIX) tablet 40 mg  40 mg Oral QAM AC Jace Vazquez MD        amLODIPine (NORVASC) tablet 10 mg  10 mg Oral Daily Jace Vazquez MD   Stopped at 04/25/19 6101    gabapentin (NEURONTIN) capsule 100 mg  100 mg Oral TID Jace Vazquez MD   Stopped at 04/25/19 1608    albuterol (PROVENTIL) nebulizer solution 2.5 mg  2.5 mg Nebulization Q6H PRN Jace Vazquez MD        cyclobenzaprine (FLEXERIL) tablet 5 mg  5 mg Oral BID PRN Jace Vazquez MD   5 mg at 04/25/19 1617    sodium chloride flush 0.9 % injection 10 mL  10 mL Intravenous 2 times per day Jace Vazquez MD        sodium chloride flush 0.9 % injection 10 mL  10 mL Intravenous PRN Jace Vazquez MD        docusate sodium (COLACE) capsule 100 mg  100 mg Oral BID Jace Vazquez MD        ceFAZolin (ANCEF) 2 g in 0.9% sodium chloride 50 mL IVPB  2 g Intravenous Q8H Jace Vazquez MD        polyethylene glycol [facility]) packet 17 g  17 g Oral Daily PRN Jace Vazquez MD        bisacodyl (DULCOLAX) suppository 10 mg  10 mg Rectal Daily PRN Jace Vazquez MD        apixaban Eliseo Musty) tablet 2.5 mg  2.5 mg Oral BID Karma Ochoa MD        morphine (PF) injection 2 mg  2 mg Intravenous Q3H PRN Karma Ochoa MD   2 mg at 04/25/19 1437    oxyCODONE-acetaminophen (PERCOCET) 5-325 MG per tablet 1 tablet  1 tablet Oral Q4H PRN Karma Ochoa MD        Or    oxyCODONE-acetaminophen (PERCOCET) 5-325 MG per tablet 2 tablet  2 tablet Oral Q4H PRN Karma Ochoa MD   2 tablet at 04/25/19 1617    hydrALAZINE (APRESOLINE) injection 5 mg  5 mg Intravenous Q4H PRN Gorge Pereira MD        0.9 % sodium chloride infusion   Intravenous Continuous Karma Ochoa MD 50 mL/hr at 04/25/19 1001      ondansetron (ZOFRAN) injection 4 mg  4 mg Intravenous Q6H PRN Karma Ochoa MD        [facility]) tablet 8.6 mg  1 tablet Oral Daily PRN Karma Ochoa MD        acetaminophen (TYLENOL) tablet 650 mg  650 mg Oral Q8H PRN Karma Ochoa MD         DVT Prophylaxis: Lovenox 40 mg sq daily    Continuous Infusions:   sodium chloride 50 mL/hr at 04/25/19 1001       Intake/Output Summary (Last 24 hours) at 4/25/2019 1855  Last data filed at 4/25/2019 1432  Gross per 24 hour   Intake 900 ml   Output 1230 ml   Net -330 ml     CBC:   Recent Labs     04/24/19 1931 04/25/19  0221   WBC 5.8 8.8   HGB 10.8* 10.2*    144     BMP:  Recent Labs     04/24/19 2051 04/25/19  0221    144   K 4.4 5.0    108   CO2 21* 26   BUN 39* 35*   CREATININE 1.6* 1.5*   GLUCOSE 94 146*     ABGs: No results found for: PHART, PO2ART, GBS8QVT  INR:   Recent Labs     04/24/19 1931   INR 1.05         Objective:   Vitals: BP (!) 170/80   Pulse 66   Temp 99 °F (37.2 °C) (Temporal)   Resp 16   Ht 5' 6\" (1.676 m)   Wt 152 lb 3.2 oz (69 kg)   SpO2 98%   BMI 24.57 kg/m²   General appearance: alert, appears stated age and cooperative  Skin: Skin color, texture, turgor normal.   HEENT: Head: Normocephalic, no lesions, without obvious abnormality.   Neck: no adenopathy, no carotid bruit, no JVD and supple, symmetrical, trachea midline  Lungs: clear to auscultation bilaterally  Heart: regular rate and rhythm, S1, S2 normal, no murmur, click, rub or gallop  Abdomen: soft, non-tender; bowel sounds normal; no masses,  no organomegaly  Extremities: extremities normal, atraumatic, no cyanosis or edema  Lymphatic: No significant lymph node enlargement papable  Neurologic: Mental status: Alert, oriented, thought content appropriate        Assessment & Plan:    · Left intertrochanteric femur fracture- sp repair- awaiting rehab  · Anemia - stable  · CKD- stable   · HTN- uncontrolled- adjust meds           Disposition: to rehab     [facility]

## 2019-04-25 NOTE — ANESTHESIA PRE PROCEDURE
Department of Anesthesiology  Preprocedure Note       Name:  Jazmine Laura   Age:  70 y.o.  :  1940                                          MRN:  [id number]         Date:  2019      Surgeon: Manisha Jin):  Brent Corona MD    Procedure: Dorsreinier Ambrose SHORT TFN (Left Leg Upper)    Medications prior to admission:   Prior to Admission medications    Medication Sig Start Date End Date Taking? Authorizing Provider   levothyroxine (SYNTHROID) 75 MCG tablet Take 1 tablet by mouth daily 19  Yes Jeni Reid MD   metoprolol (LOPRESSOR) 100 MG tablet TAKE 1 TABLET TWICE A DAY 1/15/19  Yes Jeni Reid MD   losartan (COZAAR) 100 MG tablet TAKE 1 TABLET DAILY 1/15/19  Yes Jeni Reid MD   atorvastatin (LIPITOR) 20 MG tablet TAKE 1 TABLET NIGHTLY 1/15/19  Yes Jeni Reid MD   ULORIC 40 MG TABS tablet TAKE 1 TABLET DAILY 10/16/18  Yes Jein Reid MD   amLODIPine (NORVASC) 5 MG tablet TAKE 1 TABLET DAILY 18  Yes Jeni Reid MD   B Complex-C (SUPER B COMPLEX PO) Take by mouth   Yes Historical Provider, MD   CALCIUM PO Take by mouth   Yes Historical Provider, MD   aspirin 81 MG chewable tablet Take 1 tablet by mouth daily 16  Yes APRYL Yoo   Multiple Vitamins-Minerals (WOMENS 50+  74 VITAMIN/MIN PO) Take by mouth   Yes Historical Provider, MD   gabapentin (NEURONTIN) 100 MG capsule Take 1 capsule by mouth 3 times daily for 180 days. Intended supply: 30 days.  19  Kobe Webber MD   chlorthalidone (HYGROTON) 25 MG tablet TAKE ONE TABLET BY MOUTH DAILY  Patient taking differently: daily as needed TAKE ONE TABLET BY MOUTH DAILY 19   Jeni Reid MD   omeprazole (PRILOSEC) 40 MG delayed release capsule TAKE ONE CAPSULE BY MOUTH DAILY -SWALLOW WHOLE. DO NOT CHEW OR CRUSH. 19   Jeni Reid MD   cyclobenzaprine (FLEXERIL) 5 MG tablet Take 1 tablet by mouth 2 times daily as needed for Muscle spasms 10/16/18   Jeni Reid MD   Nebulizers Donivan Helling COMPACT MINI NEBULIZER) [facility] As directed 12/13/17   Maria Mckeon MD   albuterol (PROVENTIL) (2.5 MG/3ML) 0.083% nebulizer solution Take 3 mLs by nebulization every 6 hours as needed for Wheezing 12/13/17   Maria Mckeon MD   Misc Natural Products ([place] COMPLEX/MSM PO) Take by mouth    Historical Provider, MD   Probiotic Product (PROBIOTIC DAILY PO) Take by mouth    Historical Provider, MD   Biotin 5000 MCG TABS Take by mouth daily as needed     Historical Provider, MD       Current medications:    Current Facility-Administered Medications   Medication Dose Route Frequency Provider Last Rate Last Dose    atorvastatin (LIPITOR) tablet 20 mg  20 mg Oral Nightly Avelino Alfonso MD        levothyroxine (SYNTHROID) tablet 75 mcg  75 mcg Oral Daily Avelino Alfonso MD   Stopped at 04/25/19 0746    losartan (COZAAR) tablet 100 mg  100 mg Oral Daily Avelino Alfonso MD   Stopped at 04/25/19 0746    metoprolol tartrate (LOPRESSOR) tablet 100 mg  100 mg Oral BID Riki Dsouza MD   100 mg at 04/25/19 1027    pantoprazole (PROTONIX) tablet 40 mg  40 mg Oral QAM AC Riki Dsouza MD        metoprolol (LOPRESSOR) injection 2.5 mg  2.5 mg Intravenous Q6H Avelino Alfonso MD   2.5 mg at 04/25/19 0744    amLODIPine (NORVASC) tablet 10 mg  10 mg Oral Daily Melissa Tran MD   Stopped at 04/25/19 7068    ceFAZolin (ANCEF) 1 g in sterile water 10 mL IV syringe  1 g Intravenous Once Rain Cantrell MD        0.9 % sodium chloride infusion   Intravenous Continuous Melissa Tran MD 50 mL/hr at 04/25/19 1001      ondansetron (ZOFRAN) injection 4 mg  4 mg Intravenous Q6H PRN Riki Dsouza MD        enoxaparin (LOVENOX) injection 40 mg  40 mg Subcutaneous Daily Riki Dsouza MD        senna (SENOKOT) tablet 8.6 mg  1 tablet Oral Daily PRN Avelino Alfonso MD        acetaminophen (TYLENOL) tablet 650 mg  650 mg Oral Q8H PRN Avelino Alfonso MD           Allergies:     Allergies   Allergen Reactions    Phenergan

## 2019-04-25 NOTE — H&P
Hospital Medicine    History & Physical      CC: fall left hip pain    History Obtained From:  patient, electronic medical record    HISTORY OF PRESENT ILLNESS:    The patient is a 70 y.o. female who presents to er c/o left hip pain, severe, localized not improved by any maneuver, , pain started after a mechanical fall, further details not given by the patient due to mild confusion due pain, medications and age. Imaging revealed Acute intertrochanteric fracture of the left hip. Ortho consulted and will admit for further treatment possible surgery, no cp, no dizziness, no fever. Past Medical History:        Diagnosis Date    Blindness 2014    left eye due to sroke in 2013- peter     Chronic GERD 4/26/2017    Chronic kidney disease     dr Lb Jennings pain     dr Germania Helms Headache     Hypertension     Hypothyroidism     Osteoarthritis     BILATERAL FEET    Osteoporosis     refuse treatment     Stroke [facility]) 2013    eventually lost left eye sight due to this- dr Shey Ochoa        Past Surgical History:        Procedure Laterality Date    BLADDER REPAIR  2011    bladder fell down. now feels like her Sphinctor muscle is very tight.  COLONOSCOPY  2005    CRANIAL LESION RESECTION  1981    blood clot    EYE SURGERY Left 02/02/2017    HIP SURGERY Right 2011    REPAIR, [facility]    INNER EAR SURGERY Right 1968    EARDRUM REPAIR SURGERY, [facility] [address] [place] [address]'S [facility].  IN [facility]    OTHER SURGICAL HISTORY      TUBES TIED AT AGE 38    OR EGD TRANSORAL BIOPSY SINGLE/MULTIPLE N/A 3/7/2017    Dr Saray Delgado-Gastritis    TUBAL LIGATION      UPPER GASTROINTESTINAL ENDOSCOPY  03/07/2017       Medications Prior to Admission:    Medications Prior to Admission: levothyroxine (SYNTHROID) 75 MCG tablet, Take 1 tablet by mouth daily  metoprolol (LOPRESSOR) 100 MG tablet, TAKE 1 TABLET TWICE A DAY  losartan (COZAAR) 100 MG tablet, TAKE 1 TABLET DAILY  atorvastatin (LIPITOR) 20 MG tablet, TAKE 1 TABLET affect    Left leg pain    Decreased dorsalis pedis pulse    Closed intertrochanteric fracture of hip, left, initial encounter ([facility] [address]         PLAN:     1. Admit to hospital  2. Pain control fentanyl for now  3. Ortho consult  4. Review home medications  5. Resume bb  6. Npo after MN  7. Fall precautions  8.  Further orders per clinical course      Tammi Alcaraz MD    Internal Medicine Hospitalist

## 2019-04-25 NOTE — OP NOTE
Patient Name: Sofía Marte  MRN: [id number]  : 1940    DATE of SURGERY: 2019    SURGEON: Dejon Da Silva MD    ASSISTANT: NONE     PREOPERATIVE DIAGNOSIS: Acute traumatic displaced intertrochanteric fracture of the Left femur, initial encounter for closed fracture    POSTOPERATIVE DIAGNOSIS: Acute traumatic displaced intertrochanteric fracture of the Left femur, initial encounter for closed fracture    PROCEDURE PERFORMED: Cephalomedullary Nailing Left closed displaced Intertrochanteric hip fracture      IMPLANTS: Synthes TFN size 11mm short    ANESTHESIA USED: General endotrachial anesthesia    OPERATIVE INDICATIONS: 70 y.o. female status post fall with the above named diagnosis. Surgical indications include fracture displacement, stabilization of fracture, and mobilization of the patient. Risks include, but are not limited to, anesthesia, bleeding, infection, pain, damage to local structures, need for further surgery, malunion, nonunion, fracture displacement, failure of hardware, intraoperative death. Risks, benefits, and alternative were discussed and the patient wishes to proceed with surgery. ESTIMATED BLOOD LOSS: 30 mL    DRAINS: none     COMPLICATIONS: none    SPECIMENS: none    FINDINGS: see op note    PROCEDURE in DETAIL:  The patient was seen in the preoperative holding room, the informed consent was reviewed and signed, and the correct operative extremity marked with the patients agreement. The patient was transported to the operating room, where a timeout was performed identifying the correct patient and operative site. Perioperative antibiotics were administered prior to incision. Once anesthetized, both feet were placed into well-padded boots and the patient was positioned on the fracture table. Traction and internal rotation were applied to the operative extremity and the fracture was noted to adequately align. A sterile prep and drape was then performed.     A guidepin was placed at the tip of the greater trochanter on the AP plane and in line with the intramedullary canal on the lateral view. The wire was advanced to the level of the lesser trochanter followed by introduction of the starting reamer. The nail of choice was then placed into the intramedullary canal.  Utilizing the attachment jig, a guidepin was then placed into the central aspect of the femoral head on both the AP and lateral views. Length was measured for the spiral blade and the path of the screw was drilled to the appropriate depth. The spiral blade was placed without complication and the set screw was placed proximally, loosened a 1/4 turn to allow for compression of the fracture. Again utilizing the attachment jig, a distal interlocking screw was placed into the static portion of the nail gaining excellent purchase. C-arm images were used in multiple planes showing adequate alignment of the fracture without hardware complication. Incisions were irrigated, closed in layers, and the skin was sealed with a Dermabond adhesive skin dressing. A sterile dressing was applied, the patient awakened, transported the recovery room in stable condition.     POSTOPERATIVE PLAN:  1) TTWB LLE  2) DVT prophylaxis x 6 weeks  3) PT/OT     Electronically signed by Lucas Lehman MD on 4/25/2019 at 12:16 PM

## 2019-04-25 NOTE — ANESTHESIA POSTPROCEDURE EVALUATION
Department of Anesthesiology  Postprocedure Note    Patient: Osvaldo Schmid  MRN: [id number]  YOB: 1940  Date of evaluation: 4/25/2019  Time:  1:28 PM     Procedure Summary     Date:  04/25/19 Room / Location:  [facility] OR  / [facility] OR    Anesthesia Start:  1214 Anesthesia Stop:  1328    Procedure:  INTRA TROCAR SHORT TFN (Left Leg Upper) Diagnosis:  (L FEMUR FRACTURE)    Surgeon:  Emily Hilton MD Responsible Provider:  APRYL Knutson CRNA    Anesthesia Type:  general ASA Status:  3          Anesthesia Type: general    Sav Phase I:      Sav Phase II:      Last vitals: Reviewed and per EMR flowsheets.        Anesthesia Post Evaluation    Patient location during evaluation: PACU  Patient participation: complete - patient participated  Level of consciousness: awake and alert  Pain score: 0  Airway patency: patent  Nausea & Vomiting: no nausea and no vomiting  Complications: no  Cardiovascular status: hemodynamically stable  Respiratory status: spontaneous ventilation and nasal cannula  Hydration status: stable

## 2019-04-25 NOTE — CARE COORDINATION
Pt has requested to be listed with [facility].  SW has notified the facility of the referral. Awaiting approval/denial.  [facility]  [phone]  Electronically signed by Nicolasa Livingston on 4/25/2019 at 9:03 AM

## 2019-04-25 NOTE — PLAN OF CARE
Nutrition Problem: Inadequate oral intake  Intervention: Food and/or Nutrient Delivery: Continue NPO  Nutritional Goals: Pt will progress to an oral diet

## 2019-04-26 LAB
ANION GAP SERPL CALCULATED.3IONS-SCNC: 10 MMOL/L (ref 7–19)
BUN BLDV-MCNC: 32 MG/DL (ref 8–23)
CALCIUM SERPL-MCNC: 8.2 MG/DL (ref 8.8–10.2)
CHLORIDE BLD-SCNC: 107 MMOL/L (ref 98–111)
CO2: 24 MMOL/L (ref 22–29)
CREAT SERPL-MCNC: 1.5 MG/DL (ref 0.5–0.9)
GFR NON-AFRICAN AMERICAN: 34
GLUCOSE BLD-MCNC: 196 MG/DL (ref 74–109)
HCT VFR BLD CALC: 27.1 % (ref 37–47)
HEMOGLOBIN: 8.8 G/DL (ref 12–16)
POTASSIUM SERPL-SCNC: 4 MMOL/L (ref 3.5–5)
SODIUM BLD-SCNC: 141 MMOL/L (ref 136–145)

## 2019-04-26 PROCEDURE — 6370000000 HC RX 637 (ALT 250 FOR IP): Performed by: ORTHOPAEDIC SURGERY

## 2019-04-26 PROCEDURE — 97535 SELF CARE MNGMENT TRAINING: CPT

## 2019-04-26 PROCEDURE — 85014 HEMATOCRIT: CPT

## 2019-04-26 PROCEDURE — 1210000000 HC MED SURG R&B

## 2019-04-26 PROCEDURE — 85018 HEMOGLOBIN: CPT

## 2019-04-26 PROCEDURE — 97530 THERAPEUTIC ACTIVITIES: CPT

## 2019-04-26 PROCEDURE — 36415 COLL VENOUS BLD VENIPUNCTURE: CPT

## 2019-04-26 PROCEDURE — 2580000003 HC RX 258: Performed by: ORTHOPAEDIC SURGERY

## 2019-04-26 PROCEDURE — 97161 PT EVAL LOW COMPLEX 20 MIN: CPT

## 2019-04-26 PROCEDURE — 6360000002 HC RX W HCPCS: Performed by: ORTHOPAEDIC SURGERY

## 2019-04-26 PROCEDURE — 99232 SBSQ HOSP IP/OBS MODERATE 35: CPT | Performed by: HOSPITALIST

## 2019-04-26 PROCEDURE — 80048 BASIC METABOLIC PNL TOTAL CA: CPT

## 2019-04-26 PROCEDURE — 6360000002 HC RX W HCPCS: Performed by: HOSPITALIST

## 2019-04-26 RX ADMIN — GABAPENTIN 100 MG: 100 CAPSULE ORAL at 15:09

## 2019-04-26 RX ADMIN — APIXABAN 2.5 MG: 2.5 TABLET, FILM COATED ORAL at 08:18

## 2019-04-26 RX ADMIN — METOPROLOL TARTRATE 100 MG: 50 TABLET ORAL at 21:39

## 2019-04-26 RX ADMIN — PANTOPRAZOLE SODIUM 40 MG: 40 TABLET, DELAYED RELEASE ORAL at 06:09

## 2019-04-26 RX ADMIN — LEVOTHYROXINE SODIUM 75 MCG: 75 TABLET ORAL at 08:18

## 2019-04-26 RX ADMIN — LOSARTAN POTASSIUM 100 MG: 100 TABLET ORAL at 08:18

## 2019-04-26 RX ADMIN — METOPROLOL TARTRATE 100 MG: 50 TABLET ORAL at 08:18

## 2019-04-26 RX ADMIN — AMLODIPINE BESYLATE 10 MG: 10 TABLET ORAL at 08:18

## 2019-04-26 RX ADMIN — DOCUSATE SODIUM 100 MG: 100 CAPSULE, LIQUID FILLED ORAL at 08:17

## 2019-04-26 RX ADMIN — DOCUSATE SODIUM 100 MG: 100 CAPSULE, LIQUID FILLED ORAL at 21:39

## 2019-04-26 RX ADMIN — GABAPENTIN 100 MG: 100 CAPSULE ORAL at 21:39

## 2019-04-26 RX ADMIN — Medication 2 G: at 04:02

## 2019-04-26 RX ADMIN — HYDRALAZINE HYDROCHLORIDE 5 MG: 20 INJECTION INTRAMUSCULAR; INTRAVENOUS at 03:04

## 2019-04-26 RX ADMIN — APIXABAN 2.5 MG: 2.5 TABLET, FILM COATED ORAL at 21:39

## 2019-04-26 RX ADMIN — Medication 10 ML: at 21:39

## 2019-04-26 RX ADMIN — OXYCODONE HYDROCHLORIDE AND ACETAMINOPHEN 2 TABLET: 5; 325 TABLET ORAL at 03:05

## 2019-04-26 RX ADMIN — GABAPENTIN 100 MG: 100 CAPSULE ORAL at 08:18

## 2019-04-26 RX ADMIN — ATORVASTATIN CALCIUM 20 MG: 20 TABLET, FILM COATED ORAL at 21:39

## 2019-04-26 ASSESSMENT — PAIN DESCRIPTION - ORIENTATION
ORIENTATION: LEFT
ORIENTATION: LEFT

## 2019-04-26 ASSESSMENT — PAIN DESCRIPTION - DESCRIPTORS: DESCRIPTORS: ACHING

## 2019-04-26 ASSESSMENT — PAIN SCALES - WONG BAKER
WONGBAKER_NUMERICALRESPONSE: 2
WONGBAKER_NUMERICALRESPONSE: 2

## 2019-04-26 ASSESSMENT — PAIN SCALES - GENERAL
PAINLEVEL_OUTOF10: 7
PAINLEVEL_OUTOF10: 0

## 2019-04-26 ASSESSMENT — PAIN DESCRIPTION - LOCATION
LOCATION: HIP
LOCATION: HIP

## 2019-04-26 NOTE — CARE COORDINATION
Pt has been accepted at [facility]. Pt may DC on Saturday April 27th.   [facility]   N   F  Electronically signed by Alejandro Lewis on 4/26/2019 at 7:54 AM

## 2019-04-26 NOTE — PROGRESS NOTES
Occupational Therapy  Facility/Department: [facility] SURG SERVICES  Daily Treatment Note  NAME: Olesya Bauman  : 1940  MRN: [id number]    Date of Service: 2019    Discharge Recommendations:          Assessment   Performance deficits / Impairments: Decreased functional mobility ; Decreased ADL status; Decreased endurance;Decreased balance  Assessment: Will progress as tolerated  Treatment Diagnosis: L femur ORIF  Prognosis: Good  REQUIRES OT FOLLOW UP: Yes  Activity Tolerance  Activity Tolerance: Patient Tolerated treatment well         Patient Diagnosis(es): The encounter diagnosis was Closed fracture of femur, intertrochanteric, left, initial encounter ([facility] [address] has a past medical history of Blindness, Chronic GERD, Chronic kidney disease, Foot pain, Headache, Hypertension, Hypothyroidism, Osteoarthritis, Osteoporosis, and Stroke ([facility] [address] has a past surgical history that includes cranial lesion resection (); hip surgery (Right, ); Inner ear surgery (Right, ); other surgical history; bladder repair (); Colonoscopy (); Tubal ligation; Eye surgery (Left, 2017); pr egd transoral biopsy single/multiple (N/A, 3/7/2017); Upper gastrointestinal endoscopy (2017); and Femur fracture surgery (Left, 2019). Restrictions  Restrictions/Precautions  Restrictions/Precautions: Fall Risk, Weight Bearing  Lower Extremity Weight Bearing Restrictions  Left Lower Extremity Weight Bearing: Toe Touch Weight Bearing  Subjective   General  Chart Reviewed: Yes  Patient assessed for rehabilitation services?: Yes  Response to previous treatment: Patient with no complaints from previous session  Family / Caregiver Present: Yes  Diagnosis: L femur fx. ORIF  Vital Signs  Patient Currently in Pain: No   Orientation     Objective    ADL  Toileting:  Moderate assistance        Toilet Transfers  Toilet - Technique: Stand pivot  Equipment Used: Standard bedside commode  Toilet Transfer: Minimal assistance;2 Person assistance     Transfers  Sit to stand: Minimal assistance;2 Person assistance  Stand to sit: Minimal assistance;2 Person assistance                                                                 Plan   Plan  Times per week: 3-5x/week  Times per day: Daily  G-Code     OutComes Score                                                  AM-PAC Score             Goals  Short term goals  Time Frame for Short term goals: 1 week  Short term goal 1: Supervision with toilet tfers  Short term goal 2: Supervision with seated LB dsg  Short term goal 3: CGA with clothing  mgmt.  in standing  Long term goals  Long term goal 1: Return to PLOF       Therapy Time   Individual Concurrent Group Co-treatment   Time In           Time Out           Minutes                   ANDREA Sheppard

## 2019-04-26 NOTE — PROGRESS NOTES
Orthopedic Surgery Progress Note    Ryleedinora Santana  4/26/2019      Subjective:     Systemic or Specific Complaints: No Complaints    Pain mild    Objective:     Patient Vitals for the past 24 hrs:   BP Temp Temp src Pulse Resp SpO2   04/26/19 0708 (!) 188/73 98 °F (36.7 °C) Temporal 73 18 96 %   04/26/19 0258 (!) 194/90 99.1 °F (37.3 °C) Temporal 70 14 94 %   04/25/19 2222 (!) 161/84 97.9 °F (36.6 °C) Temporal 76 14 97 %   04/25/19 2137 (!) 172/82 99.2 °F (37.3 °C) Temporal 84 14 97 %   04/25/19 1936 (!) 168/93 99.2 °F (37.3 °C) Temporal 87 14 99 %   04/25/19 1631 (!) 170/80 99 °F (37.2 °C) Temporal 66 16 98 %   04/25/19 1532 (!) 188/90 98.7 °F (37.1 °C) Temporal 70 16 98 %   04/25/19 1501 (!) 178/74 98.9 °F (37.2 °C) Temporal 54 16 97 %   04/25/19 1432 (!) 172/74 97.6 °F (36.4 °C) Temporal 62 16 94 %   04/25/19 1400 (!) 186/50 -- -- 63 13 97 %   04/25/19 1350 (!) 186/52 98.1 °F (36.7 °C) Temporal 68 15 96 %   04/25/19 1335 (!) 155/57 -- -- 68 17 97 %   04/25/19 1330 (!) 149/54 -- -- 72 17 100 %   04/25/19 1325 (!) 149/54 98.2 °F (36.8 °C) Temporal 75 12 100 %   04/25/19 1013 (!) 187/83 99.8 °F (37.7 °C) Temporal 78 16 100 %       left lower  General: alert, appears stated age and cooperative   Wound: clean, dry, intact             Dressing: clean, dry, and intact   Extremity: Distal NVI           DVT Exam: No evidence of DVT seen on physical exam.                   Data Review:  Recent Labs     04/25/19 0221 04/26/19 0159   HGB 10.2* 8.8*     Recent Labs     04/26/19 0159      K 4.0   CREATININE 1.5*       Assessment:     POD# 1 status post trochanteric entry femoral nailing due to a displaced left intertrochanteric femur fracture    Plan:      1:  DVT prophylaxis, ICE, elevate  2:  Pain control  3:  Physical therapy/Occupational therapy  4:  Anticipate discharge Saturday now that placement is arranged and if pain well controlled  5:  Toe touch weight bearing left lower extremity       Electronically signed by Brent Corona MD on 4/26/2019 at 9:00 AM

## 2019-04-26 NOTE — PROGRESS NOTES
Physical Therapy    Facility/Department: [facility] SURG SERVICES  Initial Assessment    NAME: Kiarra Rivas  : 1940  MRN: [id number]    Date of Service: 2019    Assessment   Body structures, Functions, Activity limitations: Decreased functional mobility   Assessment: pt is at an increased risk for falls and would benefit from skilled Pt services to address deficits listed in evaluation to improve pt safety with functional mobility. Treatment Diagnosis: difficulty with functional mobility  Prognosis: Good  Decision Making: Low Complexity  Patient Education: pt was educated on WB status   REQUIRES PT FOLLOW UP: Yes  Activity Tolerance  Activity Tolerance: Patient Tolerated treatment well       Patient Diagnosis(es): The encounter diagnosis was Closed fracture of femur, intertrochanteric, left, initial encounter ([facility] [address] has a past medical history of Blindness, Chronic GERD, Chronic kidney disease, Foot pain, Headache, Hypertension, Hypothyroidism, Osteoarthritis, Osteoporosis, and Stroke ([facility] [address] has a past surgical history that includes cranial lesion resection (); hip surgery (Right, ); Inner ear surgery (Right, ); other surgical history; bladder repair (); Colonoscopy (); Tubal ligation; Eye surgery (Left, 2017); pr egd transoral biopsy single/multiple (N/A, 3/7/2017); Upper gastrointestinal endoscopy (2017); and Femur fracture surgery (Left, 2019).     Restrictions  Restrictions/Precautions  Restrictions/Precautions: Fall Risk, Weight Bearing  Lower Extremity Weight Bearing Restrictions  Left Lower Extremity Weight Bearing: Toe Touch Weight Bearing  Vision/Hearing  Vision: Impaired  Vision Exceptions: (Pt blind in L eye from past stroke )  Hearing Exceptions: Hard of hearing/hearing concerns;Bilateral hearing aid(pt did not have hearing aids with her)     Subjective  General  Chart Reviewed: Yes  Patient assessed for rehabilitation services?: Yes  Additional Pertinent Hx: pt states past hx of stroke x2  Family / Caregiver Present: Yes  Diagnosis: L TFN  Follows Commands: Within Functional Limits  General Comment  Comments: pt blind in L eye  Subjective  Subjective: pt was agreeable and willing to participate  Pain Screening  Patient Currently in Pain: Yes  Pain Assessment  Pain Assessment: Faces  Hou-Baker Pain Rating: Hurts a little bit  Pain Location: Hip  Pain Orientation: Left  Vital Signs  Patient Currently in Pain: Yes       Orientation     Social/Functional History  Social/Functional History  Lives With: Significant other  Type of Home: House  Home Layout: Two level, Able to Live on Main level with bedroom/bathroom  Home Access: Stairs to enter with rails  Entrance Stairs - Number of Steps: 3  Bathroom Shower/Tub: Walk-in shower  Bathroom Equipment: Shower chair  Home Equipment: Rolling walker(pt has a RW that she states she shares with her SO)  Receives Help From: Family  ADL Assistance: Independent  Ambulation Assistance: Independent  Transfer Assistance: Independent  Active : Yes  Cognition   Cognition  Overall Cognitive Status: Exceptions  Memory: Decreased short term memory    Objective          AROM RLE (degrees)  RLE AROM: WFL  AROM LLE (degrees)  LLE General AROM: limited hip ROM, knee/ankle AROM WFL  Strength RLE  Strength RLE: WFL  Strength LLE  Comment: able to extend knee to near full extension against gravity, limited hip strength     Sensation  Overall Sensation Status: Impaired(pt stated L leg sensation differences)  Bed mobility  Supine to Sit: Minimal assistance  Comment: pt required Jeanne for LLE to EOB  Transfers  Sit to Stand: Contact guard assistance  Stand to sit: Contact guard assistance  Stand Pivot Transfers: Minimal Assistance  Comment: pt stand pivot transfer with Jeanne for walker mgmt and VC/TC for hand placements and foot pivoting. pt demonstrated awareness of TTWB and was able to maintain it during transfer.   Ambulation  Ambulation?: No Balance  Posture: Good  Sitting - Static: Fair;+  Standing - Static: Fair  Comments: pt static standing with RW fair due to WB status; good use of BUE        Plan   Plan  Times per week: 7  Times per day: Daily  Plan weeks: 2  Current Treatment Recommendations: Balance Training, Functional Mobility Training, Transfer Training, Safety Education & Training, Patient/Caregiver Education & Training, Stair training, Gait Training  Safety Devices  Type of devices: Nurse notified, Gait belt, Patient at risk for falls, Call light within reach, Left in chair    Goals  Short term goals  Time Frame for Short term goals: 14 days  Short term goal 1: pt will improve bed mobility to mod ind  Short term goal 2: pt will improve sit<>stands to SBA  Short term goal 3: pt will improve stand pivot transfers to SBA  Long term goals  Long term goal 1: pt will navigate up/down 3 stairs       Donaldo Luevano       Electronically signed by Donaldo Luevano on 4/26/2019 at 9:04 AM

## 2019-04-26 NOTE — PROGRESS NOTES
Occupational Therapy   Occupational Therapy Initial Assessment  Date: 2019   Patient Name: Sharmaine Junior  MRN: [id number]     : 1940    Date of Service: 2019    Discharge Recommendations:          Assessment   Performance deficits / Impairments: Decreased functional mobility ; Decreased ADL status; Decreased endurance;Decreased balance  Assessment: Will progress as tolerated  Treatment Diagnosis: L femur ORIF  Prognosis: Good  Decision Making: Low Complexity  REQUIRES OT FOLLOW UP: Yes  Activity Tolerance  Activity Tolerance: Patient Tolerated treatment well           Patient Diagnosis(es): The encounter diagnosis was Closed fracture of femur, intertrochanteric, left, initial encounter ([facility], [place] [address] has a past medical history of Blindness, Chronic GERD, Chronic kidney disease, Foot pain, Headache, Hypertension, Hypothyroidism, Osteoarthritis, Osteoporosis, and Stroke ([facility], [place] [address] has a past surgical history that includes cranial lesion resection (); hip surgery (Right, ); Inner ear surgery (Right, ); other surgical history; bladder repair (); Colonoscopy (); Tubal ligation; Eye surgery (Left, 2017); pr egd transoral biopsy single/multiple (N/A, 3/7/2017); Upper gastrointestinal endoscopy (2017); and Femur fracture surgery (Left, 2019). Treatment Diagnosis: L femur ORIF      Restrictions  Restrictions/Precautions  Restrictions/Precautions: Fall Risk, Weight Bearing  Lower Extremity Weight Bearing Restrictions  Left Lower Extremity Weight Bearing: Toe Touch Weight Bearing    Subjective   General  Chart Reviewed: Yes  Patient assessed for rehabilitation services?: Yes  Family / Caregiver Present: No  Diagnosis: L femur fx.  ORIF  Pain Assessment  Pain Assessment: Faces  Hou-Baker Pain Rating: Hurts a little bit  Pain Location: Hip  Pain Orientation: Left  Pain Descriptors: Aching  Social/Functional History  Social/Functional History  Lives With: Significant other  Type of Home: House  Home Layout: Two level, Able to Live on Main level with bedroom/bathroom  Home Access: Stairs to enter with rails  Entrance Stairs - Number of Steps: 3  Bathroom Shower/Tub: Walk-in shower  Bathroom Equipment: Shower chair  Home Equipment: Rolling walker  Receives Help From: Family  ADL Assistance: Independent  Ambulation Assistance: Independent  Transfer Assistance: Independent  Active : Yes       Objective   Vision: Impaired  Vision Exceptions: (Blind in R eye)  Hearing: Exceptions to [facility]  Hearing Exceptions: Hard of hearing/hearing concerns;Bilateral hearing aid    Orientation  Overall Orientation Status: Within Normal Limits        ADL  Feeding: Independent  Grooming: Independent  UE Bathing: Supervision  LE Bathing: Maximum assistance  UE Dressing: Supervision  LE Dressing: Maximum assistance  Toileting: Moderate assistance           Transfers  Stand Step Transfers: Minimal assistance  Sit to stand: Minimal assistance;Contact guard assistance  Transfer Comments: R.W.     Cognition  Overall Cognitive Status: Exceptions  Memory: Decreased short term memory        Sensation  Overall Sensation Status: Impaired(pt stated L leg sensation differences)        LUE AROM (degrees)  LUE AROM : WFL  RUE AROM (degrees)  RUE AROM : WFL  LUE Strength  Gross LUE Strength: WFL  RUE Strength  Gross RUE Strength: WFL                   Plan   Plan  Times per week: 3-5x/week  Times per day: Daily    G-Code     OutComes Score                                                  AM-PAC Score             Goals  Short term goals  Time Frame for Short term goals: 1 week  Short term goal 1: Supervision with toilet tfers  Short term goal 2: Supervision with seated LB dsg  Short term goal 3: CGA with clothing  mgmt.  in standing  Long term goals  Long term goal 1: Return to PLOF       Therapy Time   Individual Concurrent Group Co-treatment   Time In           Time Out           Minutes                   Savanna Archer

## 2019-04-27 VITALS
HEART RATE: 61 BPM | DIASTOLIC BLOOD PRESSURE: 63 MMHG | WEIGHT: 152.2 LBS | HEIGHT: 66 IN | BODY MASS INDEX: 24.46 KG/M2 | SYSTOLIC BLOOD PRESSURE: 147 MMHG | OXYGEN SATURATION: 90 % | RESPIRATION RATE: 16 BRPM | TEMPERATURE: 99.4 F

## 2019-04-27 LAB
HCT VFR BLD CALC: 27.9 % (ref 37–47)
HEMOGLOBIN: 8.9 G/DL (ref 12–16)
MCH RBC QN AUTO: 30.5 PG (ref 27–31)
MCHC RBC AUTO-ENTMCNC: 31.9 G/DL (ref 33–37)
MCV RBC AUTO: 95.5 FL (ref 81–99)
PDW BLD-RTO: 13 % (ref 11.5–14.5)
PLATELET # BLD: 150 K/UL (ref 130–400)
PMV BLD AUTO: 11.5 FL (ref 9.4–12.3)
RBC # BLD: 2.92 M/UL (ref 4.2–5.4)
WBC # BLD: 8.8 K/UL (ref 4.8–10.8)

## 2019-04-27 PROCEDURE — 6370000000 HC RX 637 (ALT 250 FOR IP): Performed by: ORTHOPAEDIC SURGERY

## 2019-04-27 PROCEDURE — 85027 COMPLETE CBC AUTOMATED: CPT

## 2019-04-27 PROCEDURE — 36415 COLL VENOUS BLD VENIPUNCTURE: CPT

## 2019-04-27 PROCEDURE — 2580000003 HC RX 258: Performed by: ORTHOPAEDIC SURGERY

## 2019-04-27 PROCEDURE — 99239 HOSP IP/OBS DSCHRG MGMT >30: CPT | Performed by: HOSPITALIST

## 2019-04-27 RX ORDER — POLYETHYLENE GLYCOL 3350 17 G/17G
17 POWDER, FOR SOLUTION ORAL DAILY
Qty: 10 EACH | Refills: 0
Start: 2019-04-27 | End: 2019-05-07

## 2019-04-27 RX ORDER — GABAPENTIN 100 MG/1
100 CAPSULE ORAL 3 TIMES DAILY
Qty: 9 CAPSULE | Refills: 0 | Status: SHIPPED | OUTPATIENT
Start: 2019-04-27 | End: 2019-10-07

## 2019-04-27 RX ORDER — OXYCODONE HYDROCHLORIDE AND ACETAMINOPHEN 5; 325 MG/1; MG/1
1 TABLET ORAL EVERY 6 HOURS PRN
Qty: 12 TABLET | Refills: 0 | Status: SHIPPED | OUTPATIENT
Start: 2019-04-27 | End: 2019-04-30

## 2019-04-27 RX ADMIN — DOCUSATE SODIUM 100 MG: 100 CAPSULE, LIQUID FILLED ORAL at 09:01

## 2019-04-27 RX ADMIN — METOPROLOL TARTRATE 100 MG: 50 TABLET ORAL at 09:00

## 2019-04-27 RX ADMIN — LOSARTAN POTASSIUM 100 MG: 100 TABLET ORAL at 09:00

## 2019-04-27 RX ADMIN — APIXABAN 2.5 MG: 2.5 TABLET, FILM COATED ORAL at 09:00

## 2019-04-27 RX ADMIN — OXYCODONE HYDROCHLORIDE AND ACETAMINOPHEN 2 TABLET: 5; 325 TABLET ORAL at 04:59

## 2019-04-27 RX ADMIN — PANTOPRAZOLE SODIUM 40 MG: 40 TABLET, DELAYED RELEASE ORAL at 04:59

## 2019-04-27 RX ADMIN — LEVOTHYROXINE SODIUM 75 MCG: 75 TABLET ORAL at 09:00

## 2019-04-27 RX ADMIN — GABAPENTIN 100 MG: 100 CAPSULE ORAL at 09:01

## 2019-04-27 RX ADMIN — AMLODIPINE BESYLATE 10 MG: 10 TABLET ORAL at 09:01

## 2019-04-27 RX ADMIN — Medication 10 ML: at 09:02

## 2019-04-27 ASSESSMENT — PAIN SCALES - GENERAL
PAINLEVEL_OUTOF10: 7
PAINLEVEL_OUTOF10: 0
PAINLEVEL_OUTOF10: 2

## 2019-04-27 ASSESSMENT — PAIN DESCRIPTION - LOCATION: LOCATION: HIP

## 2019-04-27 ASSESSMENT — PAIN DESCRIPTION - ORIENTATION: ORIENTATION: LEFT

## 2019-04-27 NOTE — PROGRESS NOTES
Physical Therapy  Collette Jose G  [id number]     04/27/19 1119   General   Missed reason Patient declined   Subjective   Subjective Pt states she has been up to [facility] twice today already and wants to stay in bed for now, states she may be leaving today   Electronically signed by Haris Castro PTA on 4/27/2019 at 11:20 AM

## 2019-04-27 NOTE — PROGRESS NOTES
HOSPITAL MEDICINE  - PROGRESS NOTE    Admit Date: 4/24/2019         CC:  Left hip pain    Subjective: pain controlled     Objective: no distress    Diet: Dietary Nutrition Supplements: Standard High Calorie Oral Supplement  DIET CARB CONTROL;  Pain is:Mild  Nausea:None  Bowel Movement/Flatus no    Data:   Scheduled Meds: Reviewed  Current Facility-Administered Medications   Medication Dose Route Frequency Provider Last Rate Last Dose    atorvastatin (LIPITOR) tablet 20 mg  20 mg Oral Nightly Kuldip Britt MD   20 mg at 04/25/19 2035    levothyroxine (SYNTHROID) tablet 75 mcg  75 mcg Oral Daily Kuldip Britt MD   75 mcg at 04/26/19 0818    losartan (COZAAR) tablet 100 mg  100 mg Oral Daily Kuldip Britt MD   100 mg at 04/26/19 0818    metoprolol tartrate (LOPRESSOR) tablet 100 mg  100 mg Oral BID Kuldip Britt MD   100 mg at 04/26/19 0818    pantoprazole (PROTONIX) tablet 40 mg  40 mg Oral QAM AC Kuldip Britt MD   40 mg at 04/26/19 0609    amLODIPine (NORVASC) tablet 10 mg  10 mg Oral Daily Kuldip Britt MD   10 mg at 04/26/19 0818    gabapentin (NEURONTIN) capsule 100 mg  100 mg Oral TID Kuldip Britt MD   100 mg at 04/26/19 1509    albuterol (PROVENTIL) nebulizer solution 2.5 mg  2.5 mg Nebulization Q6H PRN Kuldip Britt MD        cyclobenzaprine (FLEXERIL) tablet 5 mg  5 mg Oral BID PRN Kuldip Britt MD   5 mg at 04/25/19 1617    sodium chloride flush 0.9 % injection 10 mL  10 mL Intravenous 2 times per day Kuldip Britt MD        sodium chloride flush 0.9 % injection 10 mL  10 mL Intravenous PRN Kuldip Britt MD        docusate sodium (COLACE) capsule 100 mg  100 mg Oral BID Kuldip Britt MD   100 mg at 04/26/19 0817    polyethylene glycol (GLYCOLAX) packet 17 g  17 g Oral Daily PRN Kuldip Britt MD        bisacodyl (DULCOLAX) suppository 10 mg  10 mg Rectal Daily PRN Kuldip Britt MD        apixaban Catherene Cancer) tablet 2.5 mg  2.5 mg Oral BID Kuldip Britt MD 2.5 mg at 04/26/19 0818    morphine (PF) injection 2 mg  2 mg Intravenous Q3H PRN Dejon Da Silva MD   2 mg at 04/25/19 1437    oxyCODONE-acetaminophen (PERCOCET) 5-325 MG per tablet 1 tablet  1 tablet Oral Q4H PRN Dejon Da Silva MD        Or    oxyCODONE-acetaminophen (PERCOCET) 5-325 MG per tablet 2 tablet  2 tablet Oral Q4H PRN Dejon Da Silva MD   2 tablet at 04/26/19 0305    hydrALAZINE (APRESOLINE) injection 5 mg  5 mg Intravenous Q4H PRN Xenia Easton MD   5 mg at 04/26/19 0304    0.9 % sodium chloride infusion   Intravenous Continuous Dejon Da Silva MD 50 mL/hr at 04/25/19 1001      ondansetron (ZOFRAN) injection 4 mg  4 mg Intravenous Q6H PRN Dejon Da Silva MD        [facility]) tablet 8.6 mg  1 tablet Oral Daily PRN Dejon Da Silva MD        acetaminophen (TYLENOL) tablet 650 mg  650 mg Oral Q8H PRN Dejon Da Silva MD         DVT Prophylaxis: Lovenox 40 mg sq daily    Continuous Infusions:   sodium chloride 50 mL/hr at 04/25/19 1001       Intake/Output Summary (Last 24 hours) at 4/26/2019 1939  Last data filed at 4/26/2019 1832  Gross per 24 hour   Intake 430 ml   Output 600 ml   Net -170 ml     CBC:   Recent Labs     04/24/19 1931 04/25/19 0221 04/26/19  0159   WBC 5.8 8.8  --    HGB 10.8* 10.2* 8.8*    144  --      BMP:  Recent Labs     04/24/19 2051 04/25/19 0221 04/26/19  0159    144 141   K 4.4 5.0 4.0    108 107   CO2 21* 26 24   BUN 39* 35* 32*   CREATININE 1.6* 1.5* 1.5*   GLUCOSE 94 146* 196*     ABGs: No results found for: PHART, PO2ART, JKO4TEC  INR:   Recent Labs     04/24/19 1931   INR 1.05         Objective:   Vitals: BP (!) 148/68   Pulse 72   Temp 99.8 °F (37.7 °C) (Temporal)   Resp 14   Ht 5' 6\" (1.676 m)   Wt 152 lb 3.2 oz (69 kg)   SpO2 95%   BMI 24.57 kg/m²   General appearance: alert, appears stated age and cooperative  Skin: Skin color, texture, turgor normal.   HEENT: Head: Normocephalic, no lesions, without obvious abnormality.   Neck: no adenopathy, no carotid bruit, no JVD and supple, symmetrical, trachea midline  Lungs: clear to auscultation bilaterally  Heart: regular rate and rhythm, S1, S2 normal, no murmur, click, rub or gallop  Abdomen: soft, non-tender; bowel sounds normal; no masses,  no organomegaly  Extremities: extremities normal, atraumatic, no cyanosis or edema  Lymphatic: No significant lymph node enlargement papable  Neurologic: Mental status: Alert, oriented, thought content appropriate        Assessment & Plan:    · Left intertrochanteric femur fracture- sp repair- awaiting rehab  · Anemia - stable  · CKD- stable   · HTN- controlled           Disposition: to rehab [facility]

## 2019-04-27 NOTE — DISCHARGE SUMMARY
Physician Discharge Summary     Patient ID:  Collette Araiza  [id number]  6 y.o.  1940    Admit date: 4/24/2019    Discharge date and time: 4/27/2019     Admitting Physician: Ozzy Chaparro MD     Discharge Physician: Ozzy Chaparro MD    Discharge Diagnoses:     · Left intertrochanteric femur fracture- sp repair  · Anemia   · CKD  · HTN      Discharged Condition: good    Indication for Admission: hip fracture     Hospital Course:     70 y.o. female presented to er c/o left hip pain after a mechanical fall. Imaging revealed acute intertrochanteric fracture of the left hip. Ortho was consulted and patient underewent trochanteric entry femoral nailing due to a displaced left intertrochanteric femur fracture. She is doing well post op and is accepted to SNF today    Consults: orthopedic surgery    Significant Diagnostic Studies:     XR HIP 2-3 VW W PELVIS LEFT [[id number]] Resulted: 04/25/19 1332      Order Status: Completed Updated: 04/25/19 1334     Narrative:       EXAMINATION: XR HIP 2-3 VW W PELVIS LEFT 4/25/2019 1:31 PM  HISTORY: Left hip fixation. Dose: 2.44870 mGym2  Report: 2 intraoperative fluoroscopic spot views were obtained  following open reduction and internal fixation of the  supratrochanteric left hip fracture, with satisfactory hardware  positioning and alignment. Images are stored in PACS for review. Signed by Dr Sergio Ambrocio. Ni on 4/25/2019 1:32 PM     FLUORO FOR SURGICAL PROCEDURES [[id number]] Resulted: 04/25/19 1316     Order Status: Sent Updated: 04/25/19 1323     XR CHEST PORTABLE [[id number]] Resulted: 04/24/19 2027     Order Status: Completed Updated: 04/24/19 2029     Narrative:       EXAMINATION: XR CHEST PORTABLE 4/24/2019 8:23 PM  HISTORY: Fall with left hip pain  COMPARISON: 12/18/2018  FINDINGS:  Heart appears normal in size. Aorta is mildly tortuous. The lungs  appear clear without focal consolidation or effusion. No pneumothorax  is appreciated.  The pulmonary albuterol (PROVENTIL) (2.5 MG/3ML) 0.083% nebulizer solution  Take 3 mLs by nebulization every 6 hours as needed for Wheezing             amLODIPine (NORVASC) 5 MG tablet  TAKE 1 TABLET DAILY             apixaban (ELIQUIS) 2.5 MG TABS tablet  Take 1 tablet by mouth 2 times daily             aspirin 81 MG chewable tablet  Take 1 tablet by mouth daily             atorvastatin (LIPITOR) 20 MG tablet  TAKE 1 TABLET NIGHTLY             B Complex-C (SUPER B COMPLEX PO)  Take by mouth             Biotin 5000 MCG TABS  Take by mouth daily as needed              CALCIUM PO  Take by mouth             chlorthalidone (HYGROTON) 25 MG tablet  TAKE ONE TABLET BY MOUTH DAILY             cyclobenzaprine (FLEXERIL) 5 MG tablet  Take 1 tablet by mouth 2 times daily as needed for Muscle spasms             gabapentin (NEURONTIN) 100 MG capsule  Take 1 capsule by mouth 3 times daily for 3 days. Intended supply: 30 days             levothyroxine (SYNTHROID) 75 MCG tablet  Take 1 tablet by mouth daily             losartan (COZAAR) 100 MG tablet  TAKE 1 TABLET DAILY             metoprolol (LOPRESSOR) 100 MG tablet  TAKE 1 TABLET TWICE A DAY             Misc Natural Products (GLUCOSAMINE CHOND COMPLEX/MSM PO)  Take by mouth             Multiple Vitamins-Minerals (WOMENS 50+ MULTI VITAMIN/MIN PO)  Take by mouth             Nebulizers (AIRIAL COMPACT MINI NEBULIZER) MISC  As directed             omeprazole (PRILOSEC) 40 MG delayed release capsule  TAKE ONE CAPSULE BY MOUTH DAILY -SWALLOW WHOLE. DO NOT CHEW OR CRUSH. oxyCODONE-acetaminophen (PERCOCET) 5-325 MG per tablet  Take 1 tablet by mouth every 6 hours as needed for Pain for up to 3 days. polyethylene glycol (GLYCOLAX) packet  Take 17 g by mouth daily for 10 days             Probiotic Product (PROBIOTIC DAILY PO)  Take by mouth             ULORIC 40 MG TABS tablet  TAKE 1 TABLET DAILY                   Discharge Instructions:    Follow up with Denver Forester, MD

## 2019-05-22 ENCOUNTER — TELEPHONE (OUTPATIENT)
Dept: PRIMARY CARE CLINIC | Age: 79
End: 2019-05-22

## 2019-05-29 ENCOUNTER — OFFICE VISIT (OUTPATIENT)
Dept: FAMILY MEDICINE CLINIC | Age: 79
End: 2019-05-29
Payer: COMMERCIAL

## 2019-05-29 VITALS
HEART RATE: 71 BPM | TEMPERATURE: 98.2 F | DIASTOLIC BLOOD PRESSURE: 72 MMHG | SYSTOLIC BLOOD PRESSURE: 124 MMHG | OXYGEN SATURATION: 97 %

## 2019-05-29 DIAGNOSIS — Z87.81 S/P LEFT HIP FRACTURE: Primary | ICD-10-CM

## 2019-05-29 DIAGNOSIS — E03.9 ACQUIRED HYPOTHYROIDISM: ICD-10-CM

## 2019-05-29 DIAGNOSIS — Z87.81 S/P LEFT HIP FRACTURE: ICD-10-CM

## 2019-05-29 LAB
ALBUMIN SERPL-MCNC: 3.9 G/DL (ref 3.5–5.2)
ALBUMIN SERPL-MCNC: 4 G/DL (ref 3.5–5.2)
ALP BLD-CCNC: 89 U/L (ref 35–104)
ALP BLD-CCNC: 90 U/L (ref 35–104)
ALT SERPL-CCNC: 19 U/L (ref 5–33)
ALT SERPL-CCNC: 19 U/L (ref 5–33)
ANION GAP SERPL CALCULATED.3IONS-SCNC: 14 MMOL/L (ref 7–19)
ANION GAP SERPL CALCULATED.3IONS-SCNC: 16 MMOL/L (ref 7–19)
AST SERPL-CCNC: 27 U/L (ref 5–32)
AST SERPL-CCNC: 27 U/L (ref 5–32)
BACTERIA: ABNORMAL /HPF
BASOPHILS ABSOLUTE: 0 K/UL (ref 0–0.2)
BASOPHILS RELATIVE PERCENT: 0.4 % (ref 0–1)
BILIRUB SERPL-MCNC: 0.5 MG/DL (ref 0.2–1.2)
BILIRUB SERPL-MCNC: 0.5 MG/DL (ref 0.2–1.2)
BILIRUBIN URINE: NEGATIVE
BLOOD, URINE: ABNORMAL
BUN BLDV-MCNC: 40 MG/DL (ref 8–23)
BUN BLDV-MCNC: 40 MG/DL (ref 8–23)
CALCIUM SERPL-MCNC: 9.3 MG/DL (ref 8.8–10.2)
CALCIUM SERPL-MCNC: 9.7 MG/DL (ref 8.8–10.2)
CHLORIDE BLD-SCNC: 103 MMOL/L (ref 98–111)
CHLORIDE BLD-SCNC: 105 MMOL/L (ref 98–111)
CLARITY: ABNORMAL
CO2: 25 MMOL/L (ref 22–29)
CO2: 26 MMOL/L (ref 22–29)
COLOR: YELLOW
CREAT SERPL-MCNC: 1.9 MG/DL (ref 0.5–0.9)
CREAT SERPL-MCNC: 1.9 MG/DL (ref 0.5–0.9)
EOSINOPHILS ABSOLUTE: 0.1 K/UL (ref 0–0.6)
EOSINOPHILS RELATIVE PERCENT: 1.9 % (ref 0–5)
EPITHELIAL CELLS, UA: ABNORMAL /HPF
GFR NON-AFRICAN AMERICAN: 26
GFR NON-AFRICAN AMERICAN: 26
GLUCOSE BLD-MCNC: 131 MG/DL (ref 74–109)
GLUCOSE BLD-MCNC: 133 MG/DL (ref 74–109)
GLUCOSE URINE: NEGATIVE MG/DL
HCT VFR BLD CALC: 31.3 % (ref 37–47)
HCT VFR BLD CALC: 31.5 % (ref 37–47)
HEMOGLOBIN: 10.1 G/DL (ref 12–16)
HEMOGLOBIN: 9.8 G/DL (ref 12–16)
KETONES, URINE: NEGATIVE MG/DL
LEUKOCYTE ESTERASE, URINE: ABNORMAL
LYMPHOCYTES ABSOLUTE: 1 K/UL (ref 1.1–4.5)
LYMPHOCYTES RELATIVE PERCENT: 19.3 % (ref 20–40)
MCH RBC QN AUTO: 29.7 PG (ref 27–31)
MCH RBC QN AUTO: 30.8 PG (ref 27–31)
MCHC RBC AUTO-ENTMCNC: 31.1 G/DL (ref 33–37)
MCHC RBC AUTO-ENTMCNC: 32.3 G/DL (ref 33–37)
MCV RBC AUTO: 95.4 FL (ref 81–99)
MCV RBC AUTO: 95.5 FL (ref 81–99)
MONOCYTES ABSOLUTE: 0.5 K/UL (ref 0–0.9)
MONOCYTES RELATIVE PERCENT: 9 % (ref 0–10)
NEUTROPHILS ABSOLUTE: 3.5 K/UL (ref 1.5–7.5)
NEUTROPHILS RELATIVE PERCENT: 69 % (ref 50–65)
NITRITE, URINE: NEGATIVE
PDW BLD-RTO: 13.4 % (ref 11.5–14.5)
PDW BLD-RTO: 13.6 % (ref 11.5–14.5)
PH UA: 5.5 (ref 5–8)
PLATELET # BLD: 192 K/UL (ref 130–400)
PLATELET # BLD: 201 K/UL (ref 130–400)
PMV BLD AUTO: 12.3 FL (ref 9.4–12.3)
PMV BLD AUTO: 12.4 FL (ref 9.4–12.3)
POTASSIUM SERPL-SCNC: 3.8 MMOL/L (ref 3.5–5)
POTASSIUM SERPL-SCNC: 3.8 MMOL/L (ref 3.5–5)
PROTEIN UA: 100 MG/DL
RBC # BLD: 3.28 M/UL (ref 4.2–5.4)
RBC # BLD: 3.3 M/UL (ref 4.2–5.4)
RBC UA: ABNORMAL /HPF (ref 0–2)
SODIUM BLD-SCNC: 143 MMOL/L (ref 136–145)
SODIUM BLD-SCNC: 146 MMOL/L (ref 136–145)
SPECIFIC GRAVITY UA: 1.02 (ref 1–1.03)
TOTAL PROTEIN: 7.1 G/DL (ref 6.6–8.7)
TOTAL PROTEIN: 7.1 G/DL (ref 6.6–8.7)
TSH SERPL DL<=0.05 MIU/L-ACNC: 1.58 UIU/ML (ref 0.27–4.2)
URINE REFLEX TO CULTURE: YES
UROBILINOGEN, URINE: 1 E.U./DL
VITAMIN D 25-HYDROXY: 24.8 NG/ML
WBC # BLD: 5.1 K/UL (ref 4.8–10.8)
WBC # BLD: 5.3 K/UL (ref 4.8–10.8)
WBC UA: ABNORMAL /HPF (ref 0–5)

## 2019-05-29 PROCEDURE — 1111F DSCHRG MED/CURRENT MED MERGE: CPT | Performed by: FAMILY MEDICINE

## 2019-05-29 PROCEDURE — 99495 TRANSJ CARE MGMT MOD F2F 14D: CPT | Performed by: FAMILY MEDICINE

## 2019-05-29 RX ORDER — OXYCODONE HYDROCHLORIDE AND ACETAMINOPHEN 5; 325 MG/1; MG/1
TABLET ORAL
Refills: 0 | Status: ON HOLD | COMMUNITY
Start: 2019-05-22 | End: 2021-01-01 | Stop reason: HOSPADM

## 2019-05-29 RX ORDER — DOXAZOSIN MESYLATE 1 MG/1
1 TABLET ORAL
Status: ON HOLD | COMMUNITY
Start: 2019-04-16 | End: 2021-01-01 | Stop reason: HOSPADM

## 2019-05-29 ASSESSMENT — ENCOUNTER SYMPTOMS
RESPIRATORY NEGATIVE: 1
EYES NEGATIVE: 1
GASTROINTESTINAL NEGATIVE: 1
ALLERGIC/IMMUNOLOGIC NEGATIVE: 1

## 2019-05-29 NOTE — PROGRESS NOTES
NIGHTLY             B Complex-C (SUPER B COMPLEX PO)  Take by mouth             Biotin 5000 MCG TABS  Take by mouth daily as needed              CALCIUM PO  Take by mouth             chlorthalidone (HYGROTON) 25 MG tablet  TAKE ONE TABLET BY MOUTH DAILY             Cholecalciferol (VITAMIN D PO)  Take by mouth             cyclobenzaprine (FLEXERIL) 5 MG tablet  Take 1 tablet by mouth 2 times daily as needed for Muscle spasms             doxazosin (CARDURA) 1 MG tablet  1 mg             gabapentin (NEURONTIN) 100 MG capsule  Take 1 capsule by mouth 3 times daily for 3 days. Intended supply: 30 days             levothyroxine (SYNTHROID) 75 MCG tablet  Take 1 tablet by mouth daily             losartan (COZAAR) 100 MG tablet  TAKE 1 TABLET DAILY             metoprolol (LOPRESSOR) 100 MG tablet  TAKE 1 TABLET TWICE A DAY             Misc Natural Products (GLUCOSAMINE CHOND COMPLEX/MSM PO)  Take by mouth             Multiple Vitamins-Minerals (WOMENS 50+ MULTI VITAMIN/MIN PO)  Take by mouth             Nebulizers (AIRIAL COMPACT MINI NEBULIZER) MISC  As directed             omeprazole (PRILOSEC) 40 MG delayed release capsule  TAKE ONE CAPSULE BY MOUTH DAILY -SWALLOW WHOLE. DO NOT CHEW OR CRUSH.              oxyCODONE-acetaminophen (PERCOCET) 5-325 MG per tablet               Probiotic Product (PROBIOTIC DAILY PO)  Take by mouth             ULORIC 40 MG TABS tablet  TAKE 1 TABLET DAILY                   Medications marked \"taking\" at this time  Outpatient Medications Marked as Taking for the 5/29/19 encounter (Office Visit) with Herb Alvarado MD   Medication Sig Dispense Refill    doxazosin (CARDURA) 1 MG tablet 1 mg      Cholecalciferol (VITAMIN D PO) Take by mouth      oxyCODONE-acetaminophen (PERCOCET) 5-325 MG per tablet   0    levothyroxine (SYNTHROID) 75 MCG tablet Take 1 tablet by mouth daily 90 tablet 0    metoprolol (LOPRESSOR) 100 MG tablet TAKE 1 TABLET TWICE A  tablet 3    losartan (COZAAR) 100 MG tablet TAKE 1 TABLET DAILY 90 tablet 3    atorvastatin (LIPITOR) 20 MG tablet TAKE 1 TABLET NIGHTLY 90 tablet 3    chlorthalidone (HYGROTON) 25 MG tablet TAKE ONE TABLET BY MOUTH DAILY (Patient taking differently: daily as needed TAKE ONE TABLET BY MOUTH DAILY) 90 tablet 3    omeprazole (PRILOSEC) 40 MG delayed release capsule TAKE ONE CAPSULE BY MOUTH DAILY -SWALLOW WHOLE. DO NOT CHEW OR CRUSH. 90 capsule 3    ULORIC 40 MG TABS tablet TAKE 1 TABLET DAILY 90 tablet 3    cyclobenzaprine (FLEXERIL) 5 MG tablet Take 1 tablet by mouth 2 times daily as needed for Muscle spasms 60 tablet 3    amLODIPine (NORVASC) 5 MG tablet TAKE 1 TABLET DAILY 90 tablet 3    B Complex-C (SUPER B COMPLEX PO) Take by mouth      CALCIUM PO Take by mouth      Nebulizers (AIR[employer] COMPACT MINI NEBULIZER) MISC As directed 1 each 0    albuterol (PROVENTIL) (2.5 MG/3ML) 0.083% nebulizer solution Take 3 mLs by nebulization every 6 hours as needed for Wheezing 120 each 3    Misc Natural Products (GLUCOSAMINE CHOND COMPLEX/MSM PO) Take by mouth      aspirin 81 MG chewable tablet Take 1 tablet by mouth daily 30 tablet 0    Multiple Vitamins-Minerals (WOMENS 50+ MULTI VITAMIN/MIN PO) Take by mouth      Probiotic Product (PROBIOTIC DAILY PO) Take by mouth      Biotin 5000 MCG TABS Take by mouth daily as needed           Medications patient taking as of now reconciled against medications ordered at time of hospital discharge: Yes    Chief Complaint   Patient presents with    Follow-Up from Hospital     follow up from [facility] and discharge from [place], has appointment the 12th for Dr Elliot Moore at [facility]       Patient here for follow-up from hospital stay. Patient is a 70 year white female. She fall at the hospital broke her left leg. After uneventful surgery she was transferred to skilled nursing facility. At this point she is home with home health with only toe touch. Inpatient course: Discharge summary reviewed- see chart.     Interval wheezes. Abdominal: Soft. Bowel sounds are normal.   Musculoskeletal: Normal range of motion. Neurological: She is alert and oriented to person, place, and time. She has normal reflexes. Gait abnormal.   Mild left side weakness    Skin: Skin is warm and dry. Psychiatric: She has a normal mood and affect. Her behavior is normal. Judgment and thought content normal.   Vitals reviewed. Assessment/Plan:  1. S/p left hip fracture  -Continue toe touch only. Follow-up orthopedic surgery. - CBC; Future  - Comprehensive Metabolic Panel; Future  -Continue physical therapy with home health. 2. Acquired hypothyroidism-stable    - TSH without Reflex; Future        Medical Decision Making: moderate complexity  Follow-up 1 month.

## 2019-05-31 LAB
ORGANISM: ABNORMAL
URINE CULTURE, ROUTINE: ABNORMAL
URINE CULTURE, ROUTINE: ABNORMAL

## 2019-06-28 ENCOUNTER — OFFICE VISIT (OUTPATIENT)
Dept: FAMILY MEDICINE CLINIC | Age: 79
End: 2019-06-28
Payer: COMMERCIAL

## 2019-06-28 VITALS
DIASTOLIC BLOOD PRESSURE: 78 MMHG | TEMPERATURE: 97 F | OXYGEN SATURATION: 97 % | HEART RATE: 51 BPM | BODY MASS INDEX: 24.05 KG/M2 | SYSTOLIC BLOOD PRESSURE: 136 MMHG | WEIGHT: 149 LBS

## 2019-06-28 DIAGNOSIS — Z87.81 S/P LEFT HIP FRACTURE: ICD-10-CM

## 2019-06-28 DIAGNOSIS — N18.4 STAGE 4 CHRONIC KIDNEY DISEASE (HCC): ICD-10-CM

## 2019-06-28 DIAGNOSIS — Z87.81 S/P LEFT HIP FRACTURE: Primary | ICD-10-CM

## 2019-06-28 LAB
ALBUMIN SERPL-MCNC: 4.1 G/DL (ref 3.5–5.2)
ALP BLD-CCNC: 88 U/L (ref 35–104)
ALT SERPL-CCNC: 10 U/L (ref 5–33)
ANION GAP SERPL CALCULATED.3IONS-SCNC: 15 MMOL/L (ref 7–19)
AST SERPL-CCNC: 16 U/L (ref 5–32)
BILIRUB SERPL-MCNC: 0.4 MG/DL (ref 0.2–1.2)
BUN BLDV-MCNC: 32 MG/DL (ref 8–23)
CALCIUM SERPL-MCNC: 9.1 MG/DL (ref 8.8–10.2)
CHLORIDE BLD-SCNC: 103 MMOL/L (ref 98–111)
CO2: 26 MMOL/L (ref 22–29)
CREAT SERPL-MCNC: 1.6 MG/DL (ref 0.5–0.9)
GFR NON-AFRICAN AMERICAN: 31
GLUCOSE BLD-MCNC: 121 MG/DL (ref 74–109)
HCT VFR BLD CALC: 32 % (ref 37–47)
HEMOGLOBIN: 9.8 G/DL (ref 12–16)
MCH RBC QN AUTO: 29.7 PG (ref 27–31)
MCHC RBC AUTO-ENTMCNC: 30.6 G/DL (ref 33–37)
MCV RBC AUTO: 97 FL (ref 81–99)
PDW BLD-RTO: 13 % (ref 11.5–14.5)
PLATELET # BLD: 179 K/UL (ref 130–400)
PMV BLD AUTO: 12.6 FL (ref 9.4–12.3)
POTASSIUM SERPL-SCNC: 4.2 MMOL/L (ref 3.5–5)
RBC # BLD: 3.3 M/UL (ref 4.2–5.4)
SODIUM BLD-SCNC: 144 MMOL/L (ref 136–145)
TOTAL PROTEIN: 6.7 G/DL (ref 6.6–8.7)
WBC # BLD: 5 K/UL (ref 4.8–10.8)

## 2019-06-28 PROCEDURE — 99213 OFFICE O/P EST LOW 20 MIN: CPT | Performed by: FAMILY MEDICINE

## 2019-06-28 NOTE — PROGRESS NOTES
SUBJECTIVE:    Patient ID: Agustín Bird is a 70 y. o.female. HPI:   Patient here for follow-up of hip fracture   Patient is a 72-year-old white female. She have a left hip fracture. She is been doing physical therapy now. She start water therapy. She had no fall since last time I saw her. She also have chronic kidney disease. Her chronic kidney disease was slightly worse after surgery. She is been taking Percocet for pain control. Pain medication seems to be effective. Denies Medication Side Effect. She Gets  Pain Medication from Orthopedic Surgery.          Past Medical History:   Diagnosis Date    Blindness 2014    left eye due to sroke in 2013- peter     Chronic GERD 4/26/2017    Chronic kidney disease     dr Bianca Reyes pain     dr Vern Buck Headache     Hypertension     Hypothyroidism     Osteoarthritis     BILATERAL FEET    Osteoporosis     refuse treatment     Stroke [facility]) 2013    eventually lost left eye sight due to this- dr Erna Hua       Current Outpatient Medications   Medication Sig Dispense Refill    doxazosin (CARDURA) 1 MG tablet 1 mg      Cholecalciferol (VITAMIN D PO) Take by mouth      oxyCODONE-acetaminophen (PERCOCET) 5-325 MG per tablet   0    levothyroxine (SYNTHROID) 75 MCG tablet Take 1 tablet by mouth daily 90 tablet 0    metoprolol (LOPRESSOR) 100 MG tablet TAKE 1 TABLET TWICE A  tablet 3    losartan (COZAAR) 100 MG tablet TAKE 1 TABLET DAILY 90 tablet 3    atorvastatin (LIPITOR) 20 MG tablet TAKE 1 TABLET NIGHTLY 90 tablet 3    chlorthalidone (HYGROTON) 25 MG tablet TAKE ONE TABLET BY MOUTH DAILY (Patient taking differently: daily as needed TAKE ONE TABLET BY MOUTH DAILY) 90 tablet 3    omeprazole (PRILOSEC) 40 MG delayed release capsule TAKE ONE CAPSULE BY MOUTH DAILY -SWALLOW WHOLE. DO NOT CHEW OR CRUSH. 90 capsule 3    ULORIC 40 MG TABS tablet TAKE 1 TABLET DAILY 90 tablet 3    cyclobenzaprine (FLEXERIL) 5 MG tablet Take 1 tablet by mouth 2 Psychiatric: She has a normal mood and affect. Her behavior is normal. Judgment and thought content normal.   Vitals reviewed. /78 (Site: Left Upper Arm, Position: Sitting, Cuff Size: Medium Adult)   Pulse 51   Temp 97 °F (36.1 °C) (Temporal)   Wt 149 lb (67.6 kg)   SpO2 97%   BMI 24.05 kg/m²      ASSESSMENT:     Diagnosis Orders   1. S/p left hip fracture-improving CBC    Comprehensive Metabolic Panel   2. Stage 4 chronic kidney disease (HCC)-slightly worse         PLAN:    1. Continue physical therapy. Follow-up with orthopedic surgery  2. Continue same regimen. Obtain blood work. Avoid dehydration. Follow-up with nephrology.   Follow-up 3 months

## 2019-08-13 ENCOUNTER — NURSE ONLY (OUTPATIENT)
Dept: FAMILY MEDICINE CLINIC | Age: 79
End: 2019-08-13
Payer: COMMERCIAL

## 2019-08-13 DIAGNOSIS — M15.9 PRIMARY OSTEOARTHRITIS INVOLVING MULTIPLE JOINTS: Primary | ICD-10-CM

## 2019-08-13 PROCEDURE — 96372 THER/PROPH/DIAG INJ SC/IM: CPT | Performed by: FAMILY MEDICINE

## 2019-08-13 RX ORDER — TRIAMCINOLONE ACETONIDE 40 MG/ML
40 INJECTION, SUSPENSION INTRA-ARTICULAR; INTRAMUSCULAR ONCE
Status: COMPLETED | OUTPATIENT
Start: 2019-08-13 | End: 2019-08-13

## 2019-08-13 RX ORDER — DEXAMETHASONE SODIUM PHOSPHATE 4 MG/ML
4 INJECTION, SOLUTION INTRA-ARTICULAR; INTRALESIONAL; INTRAMUSCULAR; INTRAVENOUS; SOFT TISSUE ONCE
Status: COMPLETED | OUTPATIENT
Start: 2019-08-13 | End: 2019-08-13

## 2019-08-13 RX ADMIN — DEXAMETHASONE SODIUM PHOSPHATE 4 MG: 4 INJECTION, SOLUTION INTRA-ARTICULAR; INTRALESIONAL; INTRAMUSCULAR; INTRAVENOUS; SOFT TISSUE at 13:15

## 2019-08-13 RX ADMIN — TRIAMCINOLONE ACETONIDE 40 MG: 40 INJECTION, SUSPENSION INTRA-ARTICULAR; INTRAMUSCULAR at 13:16

## 2019-08-13 NOTE — PROGRESS NOTES
After obtaining consent, and per orders of Dr. Jamel Koehler, injection of 40 Kenalog and 4 of Dexamethasone given in Left upper quad. gluteus by Cornell Beatty. Patient instructed to remain in clinic for 20 minutes afterwards, and to report any adverse reaction to me immediately.

## 2019-08-22 ENCOUNTER — OFFICE VISIT (OUTPATIENT)
Dept: NEUROLOGY | Age: 79
End: 2019-08-22
Payer: COMMERCIAL

## 2019-08-22 VITALS
WEIGHT: 145.8 LBS | BODY MASS INDEX: 23.43 KG/M2 | RESPIRATION RATE: 16 BRPM | DIASTOLIC BLOOD PRESSURE: 88 MMHG | HEIGHT: 66 IN | SYSTOLIC BLOOD PRESSURE: 138 MMHG | HEART RATE: 78 BPM

## 2019-08-22 DIAGNOSIS — G25.81 RESTLESS LEGS SYNDROME: ICD-10-CM

## 2019-08-22 DIAGNOSIS — I67.9 SMALL VESSEL DISEASE, CEREBROVASCULAR: ICD-10-CM

## 2019-08-22 DIAGNOSIS — G60.9 IDIOPATHIC PERIPHERAL NEUROPATHY: Primary | ICD-10-CM

## 2019-08-22 DIAGNOSIS — R40.0 DAYTIME SOMNOLENCE: ICD-10-CM

## 2019-08-22 DIAGNOSIS — M79.605 PAIN IN BOTH LOWER EXTREMITIES: ICD-10-CM

## 2019-08-22 DIAGNOSIS — M79.604 PAIN IN BOTH LOWER EXTREMITIES: ICD-10-CM

## 2019-08-22 PROCEDURE — 99214 OFFICE O/P EST MOD 30 MIN: CPT | Performed by: PSYCHIATRY & NEUROLOGY

## 2019-08-22 NOTE — PROGRESS NOTES
[facility]    OTHER SURGICAL HISTORY      TUBES TIED AT AGE 38    [place] EGD TRANSORAL BIOPSY SINGLE/MULTIPLE N/A 3/7/2017    Dr CHEL Delgado-Gastritis   Ana Nolasco GASTROINTESTINAL ENDOSCOPY  03/07/2017       Recent Hospitalizations  · None    Significant Injuries  · None    Habits  Shiela Cloud reports that she quit smoking about 59 years ago. She has a 12.00 pack-year smoking history. She has never used smokeless tobacco. She reports that she does not drink alcohol or use drugs. Family History   Problem Relation Age of Onset    Diabetes Mother     Stroke Mother     Diabetes Sister     Stroke Sister     Diabetes Brother     Stroke Brother     Colon Cancer Neg Hx     Colon Polyps Neg Hx     Esophageal Cancer Neg Hx     Liver Cancer Neg Hx     Liver Disease Neg Hx     Stomach Cancer Neg Hx     Rectal Cancer Neg Hx        Social History  Shiela Cloud is , lives in [place], [place], and is retired.     Medications:  Current Outpatient Medications   Medication Sig Dispense Refill    doxazosin (CARDURA) 1 MG tablet 1 mg      Cholecalciferol (VITAMIN D PO) Take by mouth      oxyCODONE-acetaminophen (PERCOCET) 5-325 MG per tablet   0    levothyroxine (SYNTHROID) 75 MCG tablet Take 1 tablet by mouth daily 90 tablet 0    metoprolol (LOPRESSOR) 100 MG tablet TAKE 1 TABLET TWICE A  tablet 3    losartan (COZAAR) 100 MG tablet TAKE 1 TABLET DAILY 90 tablet 3    atorvastatin (LIPITOR) 20 MG tablet TAKE 1 TABLET NIGHTLY 90 tablet 3    chlorthalidone (HYGROTON) 25 MG tablet TAKE ONE TABLET BY MOUTH DAILY (Patient taking differently: daily as needed TAKE ONE TABLET BY MOUTH DAILY) 90 tablet 3    omeprazole (PRILOSEC) 40 MG delayed release capsule TAKE ONE CAPSULE BY MOUTH DAILY -SWALLOW WHOLE. DO NOT CHEW OR CRUSH. 90 capsule 3    ULORIC 40 MG TABS tablet TAKE 1 TABLET DAILY 90 tablet 3    cyclobenzaprine (FLEXERIL) 5 MG tablet Take 1 tablet by mouth 2 times daily as 6\" (1.676 m)   Wt 145 lb 12.8 oz (66.1 kg)   BMI 23.53 kg/m²    General appearance:  alert and cooperative with exam  HEENT:  Sclera clear, anicteric, Oropharynx clear, no lesions, Neck supple with midline trachea, Thyroid without masses and Trachea midline  Heart[de-identified]  regular rate and rhythm, S1, S2 normal, no murmur, click, rub or gallop  Lungs:  clear to auscultation bilaterally  Extremities:  extremities normal, atraumatic, no cyanosis or edema  She has flat feet and OA changes in her feet and ankles  Neurologic:  Extraocular movements are intact without nystagmus. Visual fields are full to confrontation. Facial movements are symmetrical and normal.  Speech is precise. Extremity strength is normal in both uppers and lowers. Deep tendon reflexes are intact and symmetrical.  Rapid alternating movements are unimpaired. Finger-to-nose testing is performed well, without dysmetria. Gait is normal.    Pertinent Diagnostic Studies:  Narrative   History: Ataxia, stroke suspected as etiology, initial encounter for   this event.        MRI of the brain without contrast:       Comparison study dated 12/23/13. A small area of faint restricted   diffusion is felt present in the left ventral nestor suggesting a small   area of acute to subacute nonhemorrhagic infarction. Diffuse atrophy   remains present. There is no obvious hemorrhage.       Extensive probable chronic small vessel ischemic changes are present   in the deep white matter of both hemispheres without definite change.        The mastoid inflammatory changes questioned previously are no longer   identified.       There is some faint inversion recovery signal within the left side of   the nestor which was not identified previously.        There is no extra-axial fluid collection or midline shift.           Impression   Impression:   1. A new area of faint suspected restricted diffusion within the left   central nestor.  A very small area of acute to subacute nonhemorrhagic   infarct in this area is suspected. This finding was not identified on   the 2013 comparison study. 2. Extensive atrophy and chronic small vessel ischemic changes noted   without definite significant change. 3. No definite mastoid inflammatory changes noted.       Edited by [id number]       Dictated on 8/5/2016 10:00 AM EST. Signed by Dr Bryant Ramirez on   8/5/2016 1:51 PM EST   Signed by Dr Juliette Lewis 08/05/2016 12:51       Assessment:       ICD-10-CM    1. Idiopathic peripheral neuropathy G60.9    2. Restless legs syndrome G25.81    3. Pain in both lower extremities M79.604     M79.605    4. Small vessel disease, cerebrovascular I67.9    5. Daytime somnolence R40.0    She has chronic small vessel disease. Her PN and RLS are doing reasonably well. She complains of daytime drowsiness. She is at risk for sleep apnea although her size mitigates against this. Plan:   1. Overnight pulse oximetry  2. Continue gabapentin and other medications.   3. FU here per protocol or PRN    Electronically signed by Tim Amezquita MD on 8/22/2019

## 2019-10-07 ENCOUNTER — OFFICE VISIT (OUTPATIENT)
Dept: FAMILY MEDICINE CLINIC | Age: 79
End: 2019-10-07
Payer: COMMERCIAL

## 2019-10-07 ENCOUNTER — HOSPITAL ENCOUNTER (OUTPATIENT)
Dept: GENERAL RADIOLOGY | Age: 79
Discharge: HOME | End: 2019-10-07
Payer: COMMERCIAL

## 2019-10-07 VITALS
BODY MASS INDEX: 23.63 KG/M2 | HEART RATE: 103 BPM | TEMPERATURE: 96.8 F | HEIGHT: 66 IN | SYSTOLIC BLOOD PRESSURE: 122 MMHG | DIASTOLIC BLOOD PRESSURE: 78 MMHG | WEIGHT: 147 LBS | OXYGEN SATURATION: 94 % | RESPIRATION RATE: 18 BRPM

## 2019-10-07 DIAGNOSIS — H53.9 VISUAL DISTURBANCE: ICD-10-CM

## 2019-10-07 DIAGNOSIS — R50.9 FEVER, UNSPECIFIED FEVER CAUSE: ICD-10-CM

## 2019-10-07 DIAGNOSIS — R05.9 COUGH: ICD-10-CM

## 2019-10-07 DIAGNOSIS — H54.40 BLINDNESS OF LEFT EYE, UNSPECIFIED RIGHT EYE VISUAL IMPAIRMENT CATEGORY: ICD-10-CM

## 2019-10-07 DIAGNOSIS — J30.2 ACUTE SEASONAL ALLERGIC RHINITIS: ICD-10-CM

## 2019-10-07 DIAGNOSIS — E03.9 ACQUIRED HYPOTHYROIDISM: ICD-10-CM

## 2019-10-07 DIAGNOSIS — Z79.899 MEDICATION MANAGEMENT: ICD-10-CM

## 2019-10-07 DIAGNOSIS — R47.1 DYSARTHRIA: ICD-10-CM

## 2019-10-07 DIAGNOSIS — N18.9 CHRONIC KIDNEY DISEASE, UNSPECIFIED CKD STAGE: ICD-10-CM

## 2019-10-07 DIAGNOSIS — J40 BRONCHITIS: ICD-10-CM

## 2019-10-07 DIAGNOSIS — M81.0 AGE-RELATED OSTEOPOROSIS WITHOUT CURRENT PATHOLOGICAL FRACTURE: ICD-10-CM

## 2019-10-07 DIAGNOSIS — I10 ESSENTIAL HYPERTENSION: ICD-10-CM

## 2019-10-07 DIAGNOSIS — R26.9 GAIT ABNORMALITY: ICD-10-CM

## 2019-10-07 DIAGNOSIS — I63.9 CEREBROVASCULAR ACCIDENT (CVA), UNSPECIFIED MECHANISM (HCC): ICD-10-CM

## 2019-10-07 DIAGNOSIS — I25.10 CVD (CARDIOVASCULAR DISEASE): ICD-10-CM

## 2019-10-07 LAB
AMPHETAMINE SCREEN, URINE: NEGATIVE
BARBITURATE SCREEN, URINE: NEGATIVE
BENZODIAZEPINE SCREEN, URINE: NEGATIVE
BUPRENORPHINE URINE: NORMAL
COCAINE METABOLITE SCREEN URINE: NEGATIVE
GABAPENTIN SCREEN, URINE: NORMAL
MDMA URINE: NEGATIVE
METHADONE SCREEN, URINE: NEGATIVE
METHAMPHETAMINE, URINE: NEGATIVE
OPIATE SCREEN URINE: NEGATIVE
OXYCODONE SCREEN URINE: NEGATIVE
PHENCYCLIDINE SCREEN URINE: NEGATIVE
PROPOXYPHENE SCREEN, URINE: NORMAL
THC SCREEN, URINE: NEGATIVE
TRICYCLIC ANTIDEPRESSANTS, UR: NEGATIVE

## 2019-10-07 PROCEDURE — 80305 DRUG TEST PRSMV DIR OPT OBS: CPT | Performed by: FAMILY MEDICINE

## 2019-10-07 PROCEDURE — 71046 X-RAY EXAM CHEST 2 VIEWS: CPT

## 2019-10-07 PROCEDURE — 99214 OFFICE O/P EST MOD 30 MIN: CPT | Performed by: FAMILY MEDICINE

## 2019-10-07 PROCEDURE — 96372 THER/PROPH/DIAG INJ SC/IM: CPT | Performed by: FAMILY MEDICINE

## 2019-10-07 RX ORDER — DENOSUMAB 60 MG/ML
INJECTION SUBCUTANEOUS
Status: ON HOLD | COMMUNITY
Start: 2019-07-11 | End: 2021-01-01 | Stop reason: HOSPADM

## 2019-10-07 RX ORDER — CIPROFLOXACIN 500 MG/1
500 TABLET, FILM COATED ORAL 2 TIMES DAILY
Qty: 20 TABLET | Refills: 0 | Status: SHIPPED | OUTPATIENT
Start: 2019-10-07 | End: 2019-10-17

## 2019-10-07 RX ORDER — TRIAMCINOLONE ACETONIDE 40 MG/ML
80 INJECTION, SUSPENSION INTRA-ARTICULAR; INTRAMUSCULAR ONCE
Status: COMPLETED | OUTPATIENT
Start: 2019-10-07 | End: 2019-10-07

## 2019-10-07 RX ORDER — CEFTRIAXONE 1 G/1
1 INJECTION, POWDER, FOR SOLUTION INTRAMUSCULAR; INTRAVENOUS ONCE
Status: COMPLETED | OUTPATIENT
Start: 2019-10-07 | End: 2019-10-07

## 2019-10-07 RX ORDER — LEVOTHYROXINE SODIUM 0.05 MG/1
50 TABLET ORAL DAILY
COMMUNITY
End: 2020-07-15 | Stop reason: SDUPTHER

## 2019-10-07 RX ADMIN — TRIAMCINOLONE ACETONIDE 80 MG: 40 INJECTION, SUSPENSION INTRA-ARTICULAR; INTRAMUSCULAR at 14:57

## 2019-10-07 RX ADMIN — CEFTRIAXONE 1 G: 1 INJECTION, POWDER, FOR SOLUTION INTRAMUSCULAR; INTRAVENOUS at 14:57

## 2019-10-07 ASSESSMENT — ENCOUNTER SYMPTOMS
WHEEZING: 0
SHORTNESS OF BREATH: 1
CONSTIPATION: 0
CHEST TIGHTNESS: 0
EYES NEGATIVE: 1
VOMITING: 0
BLOOD IN STOOL: 0
COUGH: 1
DIARRHEA: 0
NAUSEA: 0

## 2019-10-07 ASSESSMENT — PATIENT HEALTH QUESTIONNAIRE - PHQ9
SUM OF ALL RESPONSES TO PHQ QUESTIONS 1-9: 0
1. LITTLE INTEREST OR PLEASURE IN DOING THINGS: 0
2. FEELING DOWN, DEPRESSED OR HOPELESS: 0
SUM OF ALL RESPONSES TO PHQ9 QUESTIONS 1 & 2: 0
SUM OF ALL RESPONSES TO PHQ QUESTIONS 1-9: 0

## 2019-10-09 LAB
ALBUMIN SERPL-MCNC: 3.8 G/DL (ref 3.5–5.2)
ALP BLD-CCNC: 65 U/L (ref 35–104)
ALT SERPL-CCNC: 13 U/L (ref 5–33)
ANION GAP SERPL CALCULATED.3IONS-SCNC: 14 MMOL/L (ref 7–19)
AST SERPL-CCNC: 18 U/L (ref 5–32)
BASOPHILS ABSOLUTE: 0 K/UL (ref 0–0.2)
BASOPHILS RELATIVE PERCENT: 0.1 % (ref 0–1)
BILIRUB SERPL-MCNC: 0.3 MG/DL (ref 0.2–1.2)
BUN BLDV-MCNC: 41 MG/DL (ref 8–23)
CALCIUM SERPL-MCNC: 8.8 MG/DL (ref 8.8–10.2)
CHLORIDE BLD-SCNC: 104 MMOL/L (ref 98–111)
CO2: 24 MMOL/L (ref 22–29)
CREAT SERPL-MCNC: 1.6 MG/DL (ref 0.5–0.9)
EOSINOPHILS ABSOLUTE: 0 K/UL (ref 0–0.6)
EOSINOPHILS RELATIVE PERCENT: 0.4 % (ref 0–5)
GFR NON-AFRICAN AMERICAN: 31
GLUCOSE BLD-MCNC: 94 MG/DL (ref 74–109)
HCT VFR BLD CALC: 31.5 % (ref 37–47)
HEMOGLOBIN: 10.1 G/DL (ref 12–16)
IMMATURE GRANULOCYTES #: 0 K/UL
LYMPHOCYTES ABSOLUTE: 0.7 K/UL (ref 1.1–4.5)
LYMPHOCYTES RELATIVE PERCENT: 9.4 % (ref 20–40)
MCH RBC QN AUTO: 29 PG (ref 27–31)
MCHC RBC AUTO-ENTMCNC: 32.1 G/DL (ref 33–37)
MCV RBC AUTO: 90.5 FL (ref 81–99)
MONOCYTES ABSOLUTE: 0.5 K/UL (ref 0–0.9)
MONOCYTES RELATIVE PERCENT: 7.1 % (ref 0–10)
NEUTROPHILS ABSOLUTE: 5.9 K/UL (ref 1.5–7.5)
NEUTROPHILS RELATIVE PERCENT: 82.4 % (ref 50–65)
PARATHYROID HORMONE INTACT: 512.9 PG/ML (ref 15–65)
PDW BLD-RTO: 13.5 % (ref 11.5–14.5)
PHOSPHORUS: 3.3 MG/DL (ref 2.5–4.5)
PLATELET # BLD: 177 K/UL (ref 130–400)
PMV BLD AUTO: 12.3 FL (ref 9.4–12.3)
POTASSIUM SERPL-SCNC: 5 MMOL/L (ref 3.5–5)
RBC # BLD: 3.48 M/UL (ref 4.2–5.4)
SODIUM BLD-SCNC: 142 MMOL/L (ref 136–145)
TOTAL PROTEIN: 7.3 G/DL (ref 6.6–8.7)
VITAMIN D 25-HYDROXY: 28.7 NG/ML
WBC # BLD: 7.2 K/UL (ref 4.8–10.8)

## 2019-10-25 ENCOUNTER — TELEPHONE (OUTPATIENT)
Dept: NEUROLOGY | Age: 79
End: 2019-10-25

## 2019-11-06 ENCOUNTER — OFFICE VISIT (OUTPATIENT)
Dept: FAMILY MEDICINE CLINIC | Age: 79
End: 2019-11-06
Payer: COMMERCIAL

## 2019-11-06 VITALS
DIASTOLIC BLOOD PRESSURE: 78 MMHG | BODY MASS INDEX: 24.21 KG/M2 | WEIGHT: 150 LBS | TEMPERATURE: 97.5 F | OXYGEN SATURATION: 97 % | SYSTOLIC BLOOD PRESSURE: 136 MMHG | HEART RATE: 70 BPM

## 2019-11-06 DIAGNOSIS — J40 BRONCHITIS: Primary | ICD-10-CM

## 2019-11-06 PROCEDURE — 1090F PRES/ABSN URINE INCON ASSESS: CPT | Performed by: FAMILY MEDICINE

## 2019-11-06 PROCEDURE — G8484 FLU IMMUNIZE NO ADMIN: HCPCS | Performed by: FAMILY MEDICINE

## 2019-11-06 PROCEDURE — G8598 ASA/ANTIPLAT THER USED: HCPCS | Performed by: FAMILY MEDICINE

## 2019-11-06 PROCEDURE — G8400 PT W/DXA NO RESULTS DOC: HCPCS | Performed by: FAMILY MEDICINE

## 2019-11-06 PROCEDURE — G8420 CALC BMI NORM PARAMETERS: HCPCS | Performed by: FAMILY MEDICINE

## 2019-11-06 PROCEDURE — 96372 THER/PROPH/DIAG INJ SC/IM: CPT | Performed by: FAMILY MEDICINE

## 2019-11-06 PROCEDURE — G8427 DOCREV CUR MEDS BY ELIG CLIN: HCPCS | Performed by: FAMILY MEDICINE

## 2019-11-06 PROCEDURE — 4040F PNEUMOC VAC/ADMIN/RCVD: CPT | Performed by: FAMILY MEDICINE

## 2019-11-06 PROCEDURE — 99213 OFFICE O/P EST LOW 20 MIN: CPT | Performed by: FAMILY MEDICINE

## 2019-11-06 PROCEDURE — 1123F ACP DISCUSS/DSCN MKR DOCD: CPT | Performed by: FAMILY MEDICINE

## 2019-11-06 PROCEDURE — 1036F TOBACCO NON-USER: CPT | Performed by: FAMILY MEDICINE

## 2019-11-06 RX ORDER — DEXAMETHASONE SODIUM PHOSPHATE 4 MG/ML
4 INJECTION, SOLUTION INTRA-ARTICULAR; INTRALESIONAL; INTRAMUSCULAR; INTRAVENOUS; SOFT TISSUE ONCE
Status: COMPLETED | OUTPATIENT
Start: 2019-11-06 | End: 2019-11-06

## 2019-11-06 RX ORDER — CEPHALEXIN 500 MG/1
500 CAPSULE ORAL 3 TIMES DAILY
Qty: 30 CAPSULE | Refills: 0 | Status: SHIPPED | OUTPATIENT
Start: 2019-11-06 | End: 2021-01-01

## 2019-11-06 RX ORDER — TRIAMCINOLONE ACETONIDE 40 MG/ML
40 INJECTION, SUSPENSION INTRA-ARTICULAR; INTRAMUSCULAR ONCE
Status: COMPLETED | OUTPATIENT
Start: 2019-11-06 | End: 2019-11-06

## 2019-11-06 RX ADMIN — TRIAMCINOLONE ACETONIDE 40 MG: 40 INJECTION, SUSPENSION INTRA-ARTICULAR; INTRAMUSCULAR at 14:01

## 2019-11-06 RX ADMIN — DEXAMETHASONE SODIUM PHOSPHATE 4 MG: 4 INJECTION, SOLUTION INTRA-ARTICULAR; INTRALESIONAL; INTRAMUSCULAR; INTRAVENOUS; SOFT TISSUE at 14:00

## 2019-11-06 ASSESSMENT — ENCOUNTER SYMPTOMS
GASTROINTESTINAL NEGATIVE: 1
COUGH: 1
EYES NEGATIVE: 1
ALLERGIC/IMMUNOLOGIC NEGATIVE: 1

## 2019-12-12 DIAGNOSIS — M62.838 MUSCLE SPASM: ICD-10-CM

## 2019-12-12 DIAGNOSIS — I10 ESSENTIAL HYPERTENSION: ICD-10-CM

## 2019-12-12 DIAGNOSIS — R68.84 PAIN IN LOWER JAW: ICD-10-CM

## 2019-12-12 RX ORDER — AMLODIPINE BESYLATE 5 MG/1
TABLET ORAL
Qty: 90 TABLET | Refills: 3 | Status: SHIPPED | OUTPATIENT
Start: 2019-12-12 | End: 2021-01-01

## 2019-12-12 RX ORDER — CYCLOBENZAPRINE HCL 5 MG
5 TABLET ORAL 2 TIMES DAILY PRN
Qty: 180 TABLET | Refills: 0 | Status: ON HOLD | OUTPATIENT
Start: 2019-12-12 | End: 2021-01-01 | Stop reason: HOSPADM

## 2019-12-27 ENCOUNTER — TELEPHONE (OUTPATIENT)
Dept: PODIATRY | Facility: CLINIC | Age: 79
End: 2019-12-27

## 2020-01-07 LAB
CALCIUM SERPL-MCNC: 9.2 MG/DL (ref 8.8–10.2)
VITAMIN D 25-HYDROXY: 29.8 NG/ML

## 2020-01-29 ENCOUNTER — TELEPHONE (OUTPATIENT)
Dept: PODIATRY | Facility: CLINIC | Age: 80
End: 2020-01-29

## 2020-03-10 ENCOUNTER — TELEPHONE (OUTPATIENT)
Dept: PODIATRY | Facility: CLINIC | Age: 80
End: 2020-03-10

## 2020-03-10 RX ORDER — CYCLOBENZAPRINE HCL 5 MG
5 TABLET ORAL 3 TIMES DAILY PRN
COMMUNITY

## 2020-03-10 RX ORDER — OMEPRAZOLE 40 MG/1
40 CAPSULE, DELAYED RELEASE ORAL DAILY
COMMUNITY

## 2020-03-10 RX ORDER — CHLORTHALIDONE 25 MG/1
25 TABLET ORAL DAILY
COMMUNITY

## 2020-03-10 RX ORDER — ALBUTEROL SULFATE 90 UG/1
2 AEROSOL, METERED RESPIRATORY (INHALATION) EVERY 4 HOURS PRN
COMMUNITY

## 2020-03-11 ENCOUNTER — OFFICE VISIT (OUTPATIENT)
Dept: PODIATRY | Facility: CLINIC | Age: 80
End: 2020-03-11

## 2020-03-11 ENCOUNTER — HOSPITAL ENCOUNTER (OUTPATIENT)
Dept: GENERAL RADIOLOGY | Facility: HOSPITAL | Age: 80
Discharge: HOME | End: 2020-03-11
Admitting: PODIATRIST

## 2020-03-11 VITALS
OXYGEN SATURATION: 99 % | WEIGHT: 155 LBS | DIASTOLIC BLOOD PRESSURE: 80 MMHG | HEART RATE: 63 BPM | BODY MASS INDEX: 24.91 KG/M2 | SYSTOLIC BLOOD PRESSURE: 158 MMHG | HEIGHT: 66 IN

## 2020-03-11 DIAGNOSIS — M79.671 FOOT PAIN, RIGHT: Primary | ICD-10-CM

## 2020-03-11 DIAGNOSIS — M19.079 ARTHRITIS OF FOOT: ICD-10-CM

## 2020-03-11 DIAGNOSIS — M21.42 PES PLANUS OF BOTH FEET: ICD-10-CM

## 2020-03-11 DIAGNOSIS — L84 FOOT CALLUS: ICD-10-CM

## 2020-03-11 DIAGNOSIS — M21.41 PES PLANUS OF BOTH FEET: ICD-10-CM

## 2020-03-11 PROCEDURE — 99203 OFFICE O/P NEW LOW 30 MIN: CPT | Performed by: PODIATRIST

## 2020-03-11 PROCEDURE — 73630 X-RAY EXAM OF FOOT: CPT

## 2020-03-11 PROCEDURE — 11055 PARING/CUTG B9 HYPRKER LES 1: CPT | Performed by: PODIATRIST

## 2020-03-17 ENCOUNTER — TELEPHONE (OUTPATIENT)
Dept: PODIATRY | Facility: CLINIC | Age: 80
End: 2020-03-17

## 2020-03-30 ENCOUNTER — TELEPHONE (OUTPATIENT)
Dept: PODIATRY | Facility: CLINIC | Age: 80
End: 2020-03-30

## 2020-04-23 ENCOUNTER — TELEPHONE (OUTPATIENT)
Dept: NEUROLOGY | Age: 80
End: 2020-04-23

## 2020-04-23 NOTE — TELEPHONE ENCOUNTER
Received a referral from Dr. Katty Epstein office for this patient. Called and spoke with patient to let her know when I have her scheduled an appointment with Dr. Vernon Acuña. Patient is aware of the appointment time/date/location.

## 2020-05-08 RX ORDER — ATORVASTATIN CALCIUM 20 MG/1
TABLET, FILM COATED ORAL
Qty: 90 TABLET | Refills: 3 | Status: SHIPPED | OUTPATIENT
Start: 2020-05-08 | End: 2021-01-01 | Stop reason: SDUPTHER

## 2020-05-08 RX ORDER — METOPROLOL TARTRATE 100 MG/1
TABLET ORAL
Qty: 180 TABLET | Refills: 3 | Status: SHIPPED | OUTPATIENT
Start: 2020-05-08 | End: 2021-01-01 | Stop reason: SDUPTHER

## 2020-05-08 RX ORDER — FEBUXOSTAT 40 MG/1
TABLET, FILM COATED ORAL
Qty: 90 TABLET | Refills: 3 | Status: SHIPPED | OUTPATIENT
Start: 2020-05-08 | End: 2021-01-01 | Stop reason: SDUPTHER

## 2020-05-08 RX ORDER — OMEPRAZOLE 40 MG/1
CAPSULE, DELAYED RELEASE ORAL
Qty: 90 CAPSULE | Refills: 3 | Status: SHIPPED | OUTPATIENT
Start: 2020-05-08 | End: 2021-01-01 | Stop reason: SDUPTHER

## 2020-06-18 ENCOUNTER — OFFICE VISIT (OUTPATIENT)
Dept: NEUROLOGY | Age: 80
End: 2020-06-18
Payer: COMMERCIAL

## 2020-06-18 VITALS
HEIGHT: 66 IN | WEIGHT: 139 LBS | BODY MASS INDEX: 22.34 KG/M2 | DIASTOLIC BLOOD PRESSURE: 84 MMHG | HEART RATE: 63 BPM | SYSTOLIC BLOOD PRESSURE: 189 MMHG

## 2020-06-18 PROBLEM — M54.50 LOW BACK PAIN: Status: ACTIVE | Noted: 2020-06-18

## 2020-06-18 PROBLEM — Z86.73 HISTORY OF STROKE: Status: ACTIVE | Noted: 2020-06-18

## 2020-06-18 PROCEDURE — 99204 OFFICE O/P NEW MOD 45 MIN: CPT | Performed by: PSYCHIATRY & NEUROLOGY

## 2020-06-18 NOTE — PROGRESS NOTES
extremities 08/22/2019    Restless legs syndrome 08/22/2019    Idiopathic peripheral neuropathy 08/22/2019    Small vessel disease, cerebrovascular 08/22/2019    Low back pain 06/18/2020    History of stroke 06/18/2020     Resolved Ambulatory Problems     Diagnosis Date Noted    No Resolved Ambulatory Problems     Past Medical History:   Diagnosis Date    Blindness 2014    Chronic kidney disease     Headache     Hypertension     Hypothyroidism     Osteoarthritis     Stroke ([address] 2013       Past Surgical History:   Procedure Laterality Date    BLADDER REPAIR  2011    bladder fell down. now feels like her Sphinctor muscle is very tight.  COLONOSCOPY  2005    CRANIAL LESION RESECTION  1981    blood clot    EYE SURGERY Left 02/02/2017    FEMUR FRACTURE SURGERY Left 4/25/2019    INTRA TROCAR SHORT TFN performed by Baldemar Cohn MD at [address] [facility] Right 2011    [address] [place] [address] [place] [facility]    OTHER SURGICAL HISTORY      TUBES TIED AT AGE 38    [place] EGD TRANSORAL BIOPSY SINGLE/MULTIPLE N/A 3/7/2017    Dr Deniz Delgado-Gastritis    TUBAL LIGATION      UPPER GASTROINTESTINAL ENDOSCOPY  03/07/2017       Family History   Problem Relation Age of Onset    Diabetes Mother     Stroke Mother     Diabetes Sister     Stroke Sister     Diabetes Brother     Stroke Brother     Colon Cancer Neg Hx     Colon Polyps Neg Hx     Esophageal Cancer Neg Hx     Liver Cancer Neg Hx     Liver Disease Neg Hx     Stomach Cancer Neg Hx     Rectal Cancer Neg Hx        Allergies   Allergen Reactions    Actonel  [Risedronate Sodium]     Clarithromycin     Clonidine     Fosamax  [Alendronate Sodium]     Gabapentin     Lisinopril     Meloxicam     Nsaids     Phenergan [Promethazine] Anxiety       Social History     Socioeconomic History    Marital status:       Spouse name: Not on file    Number of children: Not hands    Tremors- no tremors in hands or head noted    Gait  Dragging right leg a bit and some imbalance    Coordination  Finger to nose-unremarkable    Lab Results   Component Value Date    [id number] 408 08/07/2017     Lab Results   Component Value Date    WBC 7.2 10/09/2019    HGB 10.1 (L) 10/09/2019    HCT 31.5 (L) 10/09/2019    MCV 90.5 10/09/2019     10/09/2019     Lab Results   Component Value Date     10/09/2019    K 5.0 10/09/2019     10/09/2019    CO2 24 10/09/2019    BUN 41 (H) 10/09/2019    CREATININE 1.6 (H) 10/09/2019    GLUCOSE 94 10/09/2019    CALCIUM 9.2 01/07/2020    PROT 7.3 10/09/2019    LABALBU 3.8 10/09/2019    BILITOT 0.3 10/09/2019    ALKPHOS 65 10/09/2019    AST 18 10/09/2019    ALT 13 10/09/2019    LABGLOM 31 (A) 10/09/2019    GLOB 2.8 02/08/2017           Assessment    ICD-10-CM    1. Weakness R53.1 Ambulatory referral to Physical Therapy     AFO Brace   2. Gait abnormality R26.9 Ambulatory referral to Physical Therapy     AFO Brace   3. Pain in both feet M79.671 Ambulatory referral to Physical Therapy    M79.672 AFO Brace   4. Low back pain, unspecified back pain laterality, unspecified chronicity, unspecified whether sciatica present M54.5    5. History of stroke Z86.73    6. Bilateral foot-drop M21.371 Ambulatory referral to Physical Therapy    M21.372 AFO Brace       Her neurological examination today was significant for bilateral foot drop. She did have weakness in her hip flexors worse on the right which she indicates may have been present since her stroke. 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 Based upon her history and examination is suspect her issues with her gait are related to her bilateral foot drop in combination with the weakness present in the proximal aspect of her legs worse on the right. She appears to cross her right leg over the left to compensate for her imbalance.  Her visual and hearing impairment and pain her her feet most likely exacerbate her balance issues. At this time she was given a prescription for bilateral ankle foot orthotics and was referred to physical therapy. No further recommendations were provided. She is to follow up with me on a PRN basis and call with any issues. Plan    Orders Placed This Encounter   Procedures    AFO Brace    Ambulatory referral to Physical Therapy       No orders of the defined types were placed in this encounter. Return if symptoms worsen or fail to improve. EMR Dragon/transcription disclaimer:Significant part of this  encounter note is electronic transcription/translation of spoken language to printed text. The electronic translation of spoken language may be erroneous, or at times, nonsensical words or phrases may be inadvertently transcribed.  Although I have reviewed the note for such errors, some may still exist.

## 2020-07-16 RX ORDER — LEVOTHYROXINE SODIUM 0.05 MG/1
50 TABLET ORAL DAILY
Qty: 90 TABLET | Refills: 0 | Status: SHIPPED | OUTPATIENT
Start: 2020-07-16 | End: 2021-01-01 | Stop reason: SDUPTHER

## 2020-07-16 RX ORDER — LOSARTAN POTASSIUM 100 MG/1
TABLET ORAL
Qty: 90 TABLET | Refills: 3 | Status: SHIPPED | OUTPATIENT
Start: 2020-07-16 | End: 2021-01-01 | Stop reason: SDUPTHER

## 2020-08-13 ENCOUNTER — TELEPHONE (OUTPATIENT)
Dept: PODIATRY | Facility: CLINIC | Age: 80
End: 2020-08-13

## 2020-08-24 ENCOUNTER — VIRTUAL VISIT (OUTPATIENT)
Dept: FAMILY MEDICINE CLINIC | Age: 80
End: 2020-08-24
Payer: COMMERCIAL

## 2020-08-24 VITALS — TEMPERATURE: 98.6 F | HEIGHT: 67 IN | BODY MASS INDEX: 21.74 KG/M2 | WEIGHT: 138.5 LBS

## 2020-08-24 PROCEDURE — G0438 PPPS, INITIAL VISIT: HCPCS | Performed by: FAMILY MEDICINE

## 2020-08-24 ASSESSMENT — PATIENT HEALTH QUESTIONNAIRE - PHQ9
SUM OF ALL RESPONSES TO PHQ QUESTIONS 1-9: 0
SUM OF ALL RESPONSES TO PHQ QUESTIONS 1-9: 0

## 2020-08-24 ASSESSMENT — LIFESTYLE VARIABLES: HOW OFTEN DO YOU HAVE A DRINK CONTAINING ALCOHOL: 0

## 2020-08-24 NOTE — PATIENT INSTRUCTIONS
Personalized Preventive Plan for Malu Olivera - 8/24/2020  Medicare offers a range of preventive health benefits. Some of the tests and screenings are paid in full while other may be subject to a deductible, co-insurance, and/or copay. Some of these benefits include a comprehensive review of your medical history including lifestyle, illnesses that may run in your family, and various assessments and screenings as appropriate. After reviewing your medical record and screening and assessments performed today your provider may have ordered immunizations, labs, imaging, and/or referrals for you. A list of these orders (if applicable) as well as your Preventive Care list are included within your After Visit Summary for your review. Other Preventive Recommendations:    · A preventive eye exam performed by an eye specialist is recommended every 1-2 years to screen for glaucoma; cataracts, macular degeneration, and other eye disorders. · A preventive dental visit is recommended every 6 months. · Try to get at least 150 minutes of exercise per week or 10,000 steps per day on a pedometer . · Order or download the FREE \"Exercise & Physical Activity: Your Everyday Guide\" from [employer]. Call 6-352.652.3045 or search [employer] online. · You need 8650-5777 mg of calcium and 0995-1662 IU of vitamin D per day. It is possible to meet your calcium requirement with diet alone, but a vitamin D supplement is usually necessary to meet this goal.  · When exposed to the sun, use a sunscreen that protects against both UVA and UVB radiation with an SPF of 30 or greater. Reapply every 2 to 3 hours or after sweating, drying off with a towel, or swimming. · Always wear a seat belt when traveling in a car. Always wear a helmet when riding a bicycle or motorcycle.

## 2020-08-24 NOTE — PROGRESS NOTES
Medicare Annual Wellness Visit  Name: Terry Day Date: 2020   MRN: [id number] Sex: Female   Age: 70 y.o. Ethnicity: Non-/Non    : 1940 Race: Kathie Patterson is here for Medicare AWV    Screenings for behavioral, psychosocial and functional/safety risks, and cognitive dysfunction are all negative except as indicated below. These results, as well as other patient data from the [address] form, are documented in Flowsheets linked to this Encounter. Allergies   Allergen Reactions    Actonel  [Risedronate Sodium]     Clarithromycin     Clonidine     Fosamax  [Alendronate Sodium]     Gabapentin     Lisinopril     Meloxicam     Nsaids     Phenergan [Promethazine] Anxiety       Prior to Visit Medications    Medication Sig Taking? Authorizing Provider   losartan (COZAAR) 100 MG tablet TAKE 1 TABLET DAILY Yes Sarah Menendez MD   levothyroxine (SYNTHROID) 50 MCG tablet Take 1 tablet by mouth Daily Yes Sarah Menendez MD   atorvastatin (LIPITOR) 20 MG tablet TAKE 1 TABLET NIGHTLY Yes Sarah Menendez MD   febuxostat (ULORIC) 40 MG TABS tablet TAKE 1 TABLET DAILY Yes Sarah Menendez MD   omeprazole (PRILOSEC) 40 MG delayed release capsule TAKE 1 CAPSULE DAILY.       SWALLOW WHOLE. DO NOT CHEW OR CRUSH Yes Sarah Menendez MD   metoprolol (LOPRESSOR) 100 MG tablet TAKE 1 TABLET TWICE A DAY Yes Sarah Menendez MD   cyclobenzaprine (FLEXERIL) 5 MG tablet Take 1 tablet by mouth 2 times daily as needed for Muscle spasms Yes Sarah Menendez MD   amLODIPine (NORVASC) 5 MG tablet TAKE 1 TABLET DAILY Yes Sarah Menendez MD   cephALEXin (KEFLEX) 500 MG capsule Take 1 capsule by mouth 3 times daily Yes Sarah Menendez MD   PROLIA 60 MG/ML SOSY SC injection  Yes Historical Provider, MD   doxazosin (CARDURA) 1 MG tablet 1 mg Yes Historical Provider, MD   Cholecalciferol (VITAMIN D PO) Take 1,000 mg by mouth daily  Yes Historical Provider, MD   oxyCODONE-acetaminophen (PERCOCET) 5-325 MG per tablet  Yes [facility] [place]    INNER EAR SURGERY Right 1968    EARDRUM REPAIR SURGERY, [place]. [place] [facility]    OTHER SURGICAL HISTORY      TUBES TIED AT AGE 38    [place] EGD TRANSORAL BIOPSY SINGLE/MULTIPLE N/A 3/7/2017    Dr Eunice Delgado-Gastritis    TUBAL LIGATION      UPPER GASTROINTESTINAL ENDOSCOPY  03/07/2017       Family History   Problem Relation Age of Onset    Diabetes Mother     Stroke Mother     Diabetes Sister     Stroke Sister     Diabetes Brother     Stroke Brother     Colon Cancer Neg Hx     Colon Polyps Neg Hx     Esophageal Cancer Neg Hx     Liver Cancer Neg Hx     Liver Disease Neg Hx     Stomach Cancer Neg Hx     Rectal Cancer Neg Hx        CareTeam (Including outside providers/suppliers regularly involved in providing care):   Patient Care Team:  Anastacio Hernandez MD as PCP - General (Family Medicine)  Anastacio Hernandez MD as PCP - [facility] Empaneled Provider  APRYL Lara as Nurse Practitioner (Family Nurse Practitioner)  Magalie Hunter DPM as Consulting Physician (Podiatry)  Syl Barcenas MD as Consulting Physician (Neurology)    Wt Readings from Last 3 Encounters:   08/24/20 138 lb 8 oz (62.8 kg)   06/18/20 139 lb (63 kg)   11/06/19 150 lb (68 kg)     Vitals:    08/24/20 0857   Temp: 98.6 °F (37 °C)   Weight: 138 lb 8 oz (62.8 kg)   Height: 5' 6.5\" (1.689 m)     Body mass index is 22.02 kg/m². Based upon direct observation of the patient, evaluation of cognition reveals recent and remote memory intact. Patient's complete Health Risk Assessment and screening values have been reviewed and are found in Flowsheets. The following problems were reviewed today and where indicated follow up appointments were made and/or referrals ordered. Positive Risk Factor Screenings with Interventions:     General Health:  General  In general, how would you say your health is?: Very Good  In the past 7 days, have you experienced any of the following?  New or Increased Pain, New or Increased Fatigue, Loneliness, Social Isolation, Stress or Anger?: None of These  Do you get the social and emotional support that you need?: Yes  Do you have a Living Will?: (!) No  General Health Risk Interventions:  · No Living Will: provided the state-specific advance directive document to the patient    Health Habits/Nutrition:  Health Habits/Nutrition  Do you exercise for at least 20 minutes 2-3 times per week?: (!) No  Have you lost any weight without trying in the past 3 months?: No  Do you eat fewer than 2 meals per day?: No  Have you seen a dentist within the past year?: (!) No(Has upper and lower dentures.)  Body mass index is 22.02 kg/m².   Health Habits/Nutrition Interventions:  · Inadequate physical activity:  educational materials provided to promote increased physical activity  · Dental exam overdue:  patient declines dental evaluation    Hearing/Vision:  No exam data present  Hearing/Vision  Do you or your family notice any trouble with your hearing?: (!) Yes  Do you have difficulty driving, watching TV, or doing any of your daily activities because of your eyesight?: (!) Yes(Loss of eyesight in left eye.)  Have you had an eye exam within the past year?: (!) No  Hearing/Vision Interventions:  · Hearing concerns:  patient declines any further evaluation/treatment for hearing issues  · Vision concerns:  patient encouraged to make appointment with his/her eye specialist    Personalized Preventive Plan   Current Health Maintenance Status  Immunization History   Administered Date(s) Administered    Influenza Virus Vaccine 05/20/2015    Influenza, High Dose (Fluzone 65 yrs and older) 10/16/2018    Influenza, Quadv, IM, (6 mo and older Fluzone, Flulaval, Fluarix and 3 yrs and older Afluria) 10/25/2016    Pneumococcal Conjugate 13-valent ([id number]) 08/07/2017    Pneumococcal Polysaccharide ([id number]) 11/20/2015    Tetanus 01/15/2019        Health Maintenance   Topic Date Due    DTaP/Tdap/Td vaccine (1 - Tdap)

## 2020-09-09 ENCOUNTER — TELEPHONE (OUTPATIENT)
Dept: PODIATRY | Facility: CLINIC | Age: 80
End: 2020-09-09

## 2020-10-30 ENCOUNTER — VIRTUAL VISIT (OUTPATIENT)
Dept: FAMILY MEDICINE CLINIC | Age: 80
End: 2020-10-30
Payer: COMMERCIAL

## 2020-10-30 ENCOUNTER — HOSPITAL ENCOUNTER (OUTPATIENT)
Dept: GENERAL RADIOLOGY | Age: 80
Discharge: HOME | End: 2020-10-30
Payer: MEDICARE

## 2020-10-30 PROCEDURE — 73610 X-RAY EXAM OF ANKLE: CPT

## 2020-10-30 PROCEDURE — 99442 PR PHYS/QHP TELEPHONE EVALUATION 11-20 MIN: CPT | Performed by: FAMILY MEDICINE

## 2020-10-30 PROCEDURE — 73630 X-RAY EXAM OF FOOT: CPT

## 2020-10-30 RX ORDER — HYDROCODONE BITARTRATE AND ACETAMINOPHEN 5; 325 MG/1; MG/1
1 TABLET ORAL EVERY 6 HOURS PRN
Qty: 12 TABLET | Refills: 0 | Status: SHIPPED | OUTPATIENT
Start: 2020-10-30 | End: 2020-11-02

## 2020-10-30 NOTE — PROGRESS NOTES
eKri Petty is a 68 y.o. female evaluated via telephone on 10/30/2020. Consent:  She and/or health care decision maker is aware that that she may receive a bill for this telephone service, depending on her insurance coverage, and has provided verbal consent to proceed: Yes      Documentation:  I communicated with the patient and/or health care decision maker about patient was getting dressed this morning when she twisted her right foot and right ankle. She have right-sided weakness after a stroke. She has some swelling and pain on that leg. She is concerned that she may have a fracture and because of her neuropathy she would not feel it. .   Details of this discussion including any medical advice provided: We will set up for an outpatient x-ray of the foot and ankle. Follow-up as needed    X-rays show acute fibular fracture. Will Aaron Hernández will provide prescription for 3 days of Norco 5 from we will refer to orthopedic surgery  I affirm this is a Patient Initiated Episode with a Patient who has not had a related appointment within my department in the past 7 days or scheduled within the next 24 hours.     Patient identification was verified at the start of the visit: Yes    Total Time: minutes: 11-20 minutes    Note: not billable if this call serves to triage the patient into an appointment for the relevant concern      Farzaneh Jin

## 2021-01-01 ENCOUNTER — APPOINTMENT (OUTPATIENT)
Dept: GENERAL RADIOLOGY | Age: 81
DRG: 082 | End: 2021-01-01
Payer: MEDICARE

## 2021-01-01 ENCOUNTER — HOSPITAL ENCOUNTER (INPATIENT)
Age: 81
LOS: 2 days | Discharge: HOSPICE | DRG: 082 | End: 2021-12-23
Attending: PEDIATRICS
Payer: MEDICARE

## 2021-01-01 ENCOUNTER — APPOINTMENT (OUTPATIENT)
Dept: CT IMAGING | Age: 81
DRG: 082 | End: 2021-01-01
Payer: MEDICARE

## 2021-01-01 ENCOUNTER — OFFICE VISIT (OUTPATIENT)
Dept: FAMILY MEDICINE CLINIC | Age: 81
End: 2021-01-01
Payer: COMMERCIAL

## 2021-01-01 ENCOUNTER — APPOINTMENT (OUTPATIENT)
Dept: MRI IMAGING | Age: 81
DRG: 082 | End: 2021-01-01
Payer: MEDICARE

## 2021-01-01 ENCOUNTER — HOSPITAL ENCOUNTER (OUTPATIENT)
Dept: GENERAL RADIOLOGY | Age: 81
Discharge: HOME | End: 2021-06-08
Payer: MEDICARE

## 2021-01-01 VITALS
HEIGHT: 66 IN | BODY MASS INDEX: 23.45 KG/M2 | WEIGHT: 145.9 LBS | DIASTOLIC BLOOD PRESSURE: 74 MMHG | OXYGEN SATURATION: 98 % | SYSTOLIC BLOOD PRESSURE: 134 MMHG | HEART RATE: 68 BPM | TEMPERATURE: 97.5 F

## 2021-01-01 VITALS
SYSTOLIC BLOOD PRESSURE: 190 MMHG | HEART RATE: 82 BPM | HEIGHT: 66 IN | TEMPERATURE: 98.4 F | WEIGHT: 136 LBS | OXYGEN SATURATION: 95 % | BODY MASS INDEX: 21.86 KG/M2 | DIASTOLIC BLOOD PRESSURE: 82 MMHG | RESPIRATION RATE: 15 BRPM

## 2021-01-01 VITALS — OXYGEN SATURATION: 98 % | HEART RATE: 75 BPM
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
## 2021-01-01 DIAGNOSIS — J40 BRONCHITIS: Primary | ICD-10-CM

## 2021-01-01 DIAGNOSIS — I10 ESSENTIAL HYPERTENSION: ICD-10-CM

## 2021-01-01 DIAGNOSIS — N18.4 STAGE 4 CHRONIC KIDNEY DISEASE (HCC): ICD-10-CM

## 2021-01-01 DIAGNOSIS — F32.A DEPRESSION, UNSPECIFIED DEPRESSION TYPE: ICD-10-CM

## 2021-01-01 DIAGNOSIS — E78.5 HYPERLIPIDEMIA, UNSPECIFIED HYPERLIPIDEMIA TYPE: ICD-10-CM

## 2021-01-01 DIAGNOSIS — U07.1 COVID-19 VIRUS INFECTION: ICD-10-CM

## 2021-01-01 DIAGNOSIS — R05.3 CHRONIC COUGH: Primary | ICD-10-CM

## 2021-01-01 DIAGNOSIS — G45.9 TIA (TRANSIENT ISCHEMIC ATTACK): Primary | ICD-10-CM

## 2021-01-01 DIAGNOSIS — R05.3 CHRONIC COUGH: ICD-10-CM

## 2021-01-01 LAB
ALBUMIN SERPL-MCNC: 3.5 G/DL (ref 3.5–5.2)
ALBUMIN SERPL-MCNC: 3.9 G/DL (ref 3.5–5.2)
ALP BLD-CCNC: 52 U/L (ref 35–104)
ALP BLD-CCNC: 66 U/L (ref 35–104)
ALT SERPL-CCNC: 13 U/L (ref 5–33)
ALT SERPL-CCNC: 14 U/L (ref 5–33)
ANION GAP SERPL CALCULATED.3IONS-SCNC: 13 MMOL/L (ref 7–19)
ANION GAP SERPL CALCULATED.3IONS-SCNC: 14 MMOL/L (ref 7–19)
ANION GAP SERPL CALCULATED.3IONS-SCNC: 16 MMOL/L (ref 7–19)
ANION GAP SERPL CALCULATED.3IONS-SCNC: 16 MMOL/L (ref 7–19)
AST SERPL-CCNC: 21 U/L (ref 5–32)
AST SERPL-CCNC: 22 U/L (ref 5–32)
ATYPICAL LYMPHOCYTE RELATIVE PERCENT: 2 % (ref 0–8)
BACTERIA: ABNORMAL /HPF
BASOPHILS ABSOLUTE: 0 K/UL (ref 0–0.2)
BASOPHILS RELATIVE PERCENT: 0 % (ref 0–1)
BASOPHILS RELATIVE PERCENT: 0 % (ref 0–1)
BASOPHILS RELATIVE PERCENT: 0.1 % (ref 0–1)
BILIRUB SERPL-MCNC: 0.4 MG/DL (ref 0.2–1.2)
BILIRUB SERPL-MCNC: 0.7 MG/DL (ref 0.2–1.2)
BILIRUBIN URINE: NEGATIVE
BLOOD, URINE: NEGATIVE
BUN BLDV-MCNC: 35 MG/DL (ref 8–23)
BUN BLDV-MCNC: 47 MG/DL (ref 8–23)
BUN BLDV-MCNC: 47 MG/DL (ref 8–23)
BUN BLDV-MCNC: 59 MG/DL (ref 8–23)
C-REACTIVE PROTEIN: 2.96 MG/DL (ref 0–0.5)
CALCIUM SERPL-MCNC: 8 MG/DL (ref 8.8–10.2)
CALCIUM SERPL-MCNC: 8.1 MG/DL (ref 8.8–10.2)
CALCIUM SERPL-MCNC: 8.2 MG/DL (ref 8.8–10.2)
CALCIUM SERPL-MCNC: 8.9 MG/DL (ref 8.8–10.2)
CALCIUM SERPL-MCNC: 9.3 MG/DL (ref 8.8–10.2)
CHLORIDE BLD-SCNC: 103 MMOL/L (ref 98–111)
CHLORIDE BLD-SCNC: 104 MMOL/L (ref 98–111)
CHOLESTEROL, TOTAL: 139 MG/DL (ref 160–199)
CLARITY: CLEAR
CO2: 18 MMOL/L (ref 22–29)
CO2: 18 MMOL/L (ref 22–29)
CO2: 19 MMOL/L (ref 22–29)
CO2: 21 MMOL/L (ref 22–29)
COLOR: YELLOW
CREAT SERPL-MCNC: 2 MG/DL (ref 0.5–0.9)
CREAT SERPL-MCNC: 2.1 MG/DL (ref 0.5–0.9)
CREAT SERPL-MCNC: 2.1 MG/DL (ref 0.5–0.9)
CREAT SERPL-MCNC: 2.6 MG/DL (ref 0.5–0.9)
EKG P AXIS: 69 DEGREES
EKG P AXIS: 69 DEGREES
EKG P-R INTERVAL: 150 MS
EKG P-R INTERVAL: 180 MS
EKG Q-T INTERVAL: 406 MS
EKG Q-T INTERVAL: 406 MS
EKG QRS DURATION: 94 MS
EKG QRS DURATION: 96 MS
EKG QTC CALCULATION (BAZETT): 442 MS
EKG QTC CALCULATION (BAZETT): 448 MS
EKG T AXIS: 52 DEGREES
EKG T AXIS: 72 DEGREES
EOSINOPHILS ABSOLUTE: 0 K/UL (ref 0–0.6)
EOSINOPHILS RELATIVE PERCENT: 0 % (ref 0–5)
EPITHELIAL CELLS, UA: ABNORMAL /HPF
FERRITIN: 216 NG/ML (ref 13–150)
FOLATE: >20 NG/ML (ref 4.8–37.3)
GFR AFRICAN AMERICAN: 21
GFR AFRICAN AMERICAN: 27
GFR AFRICAN AMERICAN: 28
GFR AFRICAN AMERICAN: 29
GFR NON-AFRICAN AMERICAN: 18
GFR NON-AFRICAN AMERICAN: 23
GFR NON-AFRICAN AMERICAN: 23
GFR NON-AFRICAN AMERICAN: 24
GLUCOSE BLD-MCNC: 103 MG/DL (ref 74–109)
GLUCOSE BLD-MCNC: 144 MG/DL (ref 74–109)
GLUCOSE BLD-MCNC: 148 MG/DL (ref 74–109)
GLUCOSE BLD-MCNC: 231 MG/DL (ref 74–109)
GLUCOSE BLD-MCNC: 85 MG/DL (ref 70–99)
GLUCOSE URINE: NEGATIVE MG/DL
HCT VFR BLD CALC: 29.9 % (ref 37–47)
HCT VFR BLD CALC: 34.4 % (ref 37–47)
HCT VFR BLD CALC: 34.8 % (ref 37–47)
HCT VFR BLD CALC: 35.3 % (ref 37–47)
HDLC SERPL-MCNC: 66 MG/DL (ref 65–121)
HEMOGLOBIN: 10.9 G/DL (ref 12–16)
HEMOGLOBIN: 11 G/DL (ref 12–16)
HEMOGLOBIN: 11.1 G/DL (ref 12–16)
HEMOGLOBIN: 9.9 G/DL (ref 12–16)
HYALINE CASTS: ABNORMAL /LPF (ref 0–5)
IMMATURE GRANULOCYTES #: 0 K/UL
IMMATURE GRANULOCYTES #: 0 K/UL
IMMATURE GRANULOCYTES #: 0.1 K/UL
INR BLD: 1.14 (ref 0.88–1.18)
INR BLD: 1.56 (ref 0.88–1.18)
KETONES, URINE: NEGATIVE MG/DL
LACTIC ACID: 0.9 MMOL/L (ref 0.5–1.9)
LDL CHOLESTEROL CALCULATED: 45 MG/DL
LEUKOCYTE ESTERASE, URINE: NEGATIVE
LV EF: 73 %
LVEF MODALITY: NORMAL
LYMPHOCYTES ABSOLUTE: 0.2 K/UL (ref 1.1–4.5)
LYMPHOCYTES ABSOLUTE: 0.4 K/UL (ref 1.1–4.5)
LYMPHOCYTES ABSOLUTE: 0.6 K/UL (ref 1.1–4.5)
LYMPHOCYTES RELATIVE PERCENT: 10 % (ref 20–40)
LYMPHOCYTES RELATIVE PERCENT: 14.6 % (ref 20–40)
LYMPHOCYTES RELATIVE PERCENT: 4.6 % (ref 20–40)
MCH RBC QN AUTO: 28.2 PG (ref 27–31)
MCH RBC QN AUTO: 28.8 PG (ref 27–31)
MCH RBC QN AUTO: 29.3 PG (ref 27–31)
MCH RBC QN AUTO: 30.6 PG (ref 27–31)
MCHC RBC AUTO-ENTMCNC: 31.2 G/DL (ref 33–37)
MCHC RBC AUTO-ENTMCNC: 31.3 G/DL (ref 33–37)
MCHC RBC AUTO-ENTMCNC: 32.3 G/DL (ref 33–37)
MCHC RBC AUTO-ENTMCNC: 33.1 G/DL (ref 33–37)
MCV RBC AUTO: 89.4 FL (ref 81–99)
MCV RBC AUTO: 90.2 FL (ref 81–99)
MCV RBC AUTO: 92.3 FL (ref 81–99)
MCV RBC AUTO: 94.1 FL (ref 81–99)
MONOCYTES ABSOLUTE: 0 K/UL (ref 0–0.9)
MONOCYTES ABSOLUTE: 0.3 K/UL (ref 0–0.9)
MONOCYTES ABSOLUTE: 0.4 K/UL (ref 0–0.9)
MONOCYTES RELATIVE PERCENT: 0 % (ref 0–10)
MONOCYTES RELATIVE PERCENT: 4.4 % (ref 0–10)
MONOCYTES RELATIVE PERCENT: 8 % (ref 0–10)
MYELOCYTE PERCENT: 2 %
NEUTROPHILS ABSOLUTE: 1.7 K/UL (ref 1.5–7.5)
NEUTROPHILS ABSOLUTE: 3.3 K/UL (ref 1.5–7.5)
NEUTROPHILS ABSOLUTE: 8.7 K/UL (ref 1.5–7.5)
NEUTROPHILS RELATIVE PERCENT: 76.9 % (ref 50–65)
NEUTROPHILS RELATIVE PERCENT: 86 % (ref 50–65)
NEUTROPHILS RELATIVE PERCENT: 89.9 % (ref 50–65)
NITRITE, URINE: NEGATIVE
PDW BLD-RTO: 12.6 % (ref 11.5–14.5)
PDW BLD-RTO: 12.7 % (ref 11.5–14.5)
PDW BLD-RTO: 12.9 % (ref 11.5–14.5)
PDW BLD-RTO: 13.5 % (ref 11.5–14.5)
PERFORMED ON: NORMAL
PH UA: 6 (ref 5–8)
PLATELET # BLD: 106 K/UL (ref 130–400)
PLATELET # BLD: 112 K/UL (ref 130–400)
PLATELET # BLD: 140 K/UL (ref 130–400)
PLATELET # BLD: 157 K/UL (ref 130–400)
PLATELET SLIDE REVIEW: ABNORMAL
PMV BLD AUTO: 12.1 FL (ref 9.4–12.3)
PMV BLD AUTO: 12.1 FL (ref 9.4–12.3)
PMV BLD AUTO: 12.2 FL (ref 9.4–12.3)
PMV BLD AUTO: 12.7 FL (ref 9.4–12.3)
POTASSIUM REFLEX MAGNESIUM: 4.4 MMOL/L (ref 3.5–5)
POTASSIUM REFLEX MAGNESIUM: 4.7 MMOL/L (ref 3.5–5)
POTASSIUM REFLEX MAGNESIUM: 4.9 MMOL/L (ref 3.5–5)
POTASSIUM SERPL-SCNC: 4.7 MMOL/L (ref 3.5–5)
PROTEIN UA: 100 MG/DL
PROTHROMBIN TIME: 14.8 SEC (ref 12–14.6)
PROTHROMBIN TIME: 18.7 SEC (ref 12–14.6)
RBC # BLD: 3.24 M/UL (ref 4.2–5.4)
RBC # BLD: 3.75 M/UL (ref 4.2–5.4)
RBC # BLD: 3.85 M/UL (ref 4.2–5.4)
RBC # BLD: 3.86 M/UL (ref 4.2–5.4)
RBC # BLD: NORMAL 10*6/UL
RBC UA: ABNORMAL /HPF (ref 0–2)
REASON FOR REJECTION: NORMAL
REJECTED TEST: NORMAL
SARS-COV-2, NAA: NOT DETECTED
SARS-COV-2, NAAT: DETECTED
SODIUM BLD-SCNC: 135 MMOL/L (ref 136–145)
SODIUM BLD-SCNC: 138 MMOL/L (ref 136–145)
SODIUM BLD-SCNC: 138 MMOL/L (ref 136–145)
SODIUM BLD-SCNC: 139 MMOL/L (ref 136–145)
SPECIFIC GRAVITY UA: 1.01 (ref 1–1.03)
T4 FREE: 1.19 NG/DL (ref 0.93–1.7)
TOTAL PROTEIN: 6.4 G/DL (ref 6.6–8.7)
TOTAL PROTEIN: 6.5 G/DL (ref 6.6–8.7)
TRIGL SERPL-MCNC: 140 MG/DL (ref 0–149)
TROPONIN: 0.01 NG/ML (ref 0–0.03)
TROPONIN: 0.02 NG/ML (ref 0–0.03)
TSH REFLEX FT4: 7.01 UIU/ML (ref 0.35–5.5)
TSH SERPL DL<=0.05 MIU/L-ACNC: 6.06 UIU/ML (ref 0.27–4.2)
UROBILINOGEN, URINE: 0.2 E.U./DL
VITAMIN B-12: >2000 PG/ML (ref 211–946)
VITAMIN D 25-HYDROXY: 35 NG/ML
WBC # BLD: 1.9 K/UL (ref 4.8–10.8)
WBC # BLD: 4.3 K/UL (ref 4.8–10.8)
WBC # BLD: 5.2 K/UL (ref 4.8–10.8)
WBC # BLD: 9.6 K/UL (ref 4.8–10.8)
WBC UA: ABNORMAL /HPF (ref 0–5)

## 2021-01-01 PROCEDURE — 1123F ACP DISCUSS/DSCN MKR DOCD: CPT | Performed by: FAMILY MEDICINE

## 2021-01-01 PROCEDURE — 4040F PNEUMOC VAC/ADMIN/RCVD: CPT | Performed by: FAMILY MEDICINE

## 2021-01-01 PROCEDURE — 87635 SARS-COV-2 COVID-19 AMP PRB: CPT

## 2021-01-01 PROCEDURE — 1210000000 HC MED SURG R&B

## 2021-01-01 PROCEDURE — 80048 BASIC METABOLIC PNL TOTAL CA: CPT

## 2021-01-01 PROCEDURE — 70551 MRI BRAIN STEM W/O DYE: CPT

## 2021-01-01 PROCEDURE — G8400 PT W/DXA NO RESULTS DOC: HCPCS | Performed by: FAMILY MEDICINE

## 2021-01-01 PROCEDURE — 6370000000 HC RX 637 (ALT 250 FOR IP): Performed by: PSYCHIATRY & NEUROLOGY

## 2021-01-01 PROCEDURE — C8929 TTE W OR WO FOL WCON,DOPPLER: HCPCS

## 2021-01-01 PROCEDURE — 83605 ASSAY OF LACTIC ACID: CPT

## 2021-01-01 PROCEDURE — 6370000000 HC RX 637 (ALT 250 FOR IP): Performed by: NURSE PRACTITIONER

## 2021-01-01 PROCEDURE — 99214 OFFICE O/P EST MOD 30 MIN: CPT | Performed by: FAMILY MEDICINE

## 2021-01-01 PROCEDURE — 36415 COLL VENOUS BLD VENIPUNCTURE: CPT

## 2021-01-01 PROCEDURE — 99284 EMERGENCY DEPT VISIT MOD MDM: CPT

## 2021-01-01 PROCEDURE — 92610 EVALUATE SWALLOWING FUNCTION: CPT

## 2021-01-01 PROCEDURE — 99223 1ST HOSP IP/OBS HIGH 75: CPT | Performed by: PSYCHIATRY & NEUROLOGY

## 2021-01-01 PROCEDURE — 93005 ELECTROCARDIOGRAM TRACING: CPT | Performed by: HOSPITALIST

## 2021-01-01 PROCEDURE — 1090F PRES/ABSN URINE INCON ASSESS: CPT | Performed by: FAMILY MEDICINE

## 2021-01-01 PROCEDURE — 84443 ASSAY THYROID STIM HORMONE: CPT

## 2021-01-01 PROCEDURE — 99233 SBSQ HOSP IP/OBS HIGH 50: CPT | Performed by: PSYCHIATRY & NEUROLOGY

## 2021-01-01 PROCEDURE — 82728 ASSAY OF FERRITIN: CPT

## 2021-01-01 PROCEDURE — 6370000000 HC RX 637 (ALT 250 FOR IP): Performed by: HOSPITALIST

## 2021-01-01 PROCEDURE — 2580000003 HC RX 258: Performed by: NURSE PRACTITIONER

## 2021-01-01 PROCEDURE — 85025 COMPLETE CBC W/AUTO DIFF WBC: CPT

## 2021-01-01 PROCEDURE — 86140 C-REACTIVE PROTEIN: CPT

## 2021-01-01 PROCEDURE — 6360000002 HC RX W HCPCS: Performed by: HOSPITALIST

## 2021-01-01 PROCEDURE — 2500000003 HC RX 250 WO HCPCS: Performed by: NURSE PRACTITIONER

## 2021-01-01 PROCEDURE — 97530 THERAPEUTIC ACTIVITIES: CPT

## 2021-01-01 PROCEDURE — 82947 ASSAY GLUCOSE BLOOD QUANT: CPT

## 2021-01-01 PROCEDURE — 97535 SELF CARE MNGMENT TRAINING: CPT

## 2021-01-01 PROCEDURE — 99223 1ST HOSP IP/OBS HIGH 75: CPT | Performed by: NEUROLOGICAL SURGERY

## 2021-01-01 PROCEDURE — 6360000002 HC RX W HCPCS: Performed by: NURSE PRACTITIONER

## 2021-01-01 PROCEDURE — 82746 ASSAY OF FOLIC ACID SERUM: CPT

## 2021-01-01 PROCEDURE — 84484 ASSAY OF TROPONIN QUANT: CPT

## 2021-01-01 PROCEDURE — 71045 X-RAY EXAM CHEST 1 VIEW: CPT

## 2021-01-01 PROCEDURE — 6360000004 HC RX CONTRAST MEDICATION: Performed by: STUDENT IN AN ORGANIZED HEALTH CARE EDUCATION/TRAINING PROGRAM

## 2021-01-01 PROCEDURE — G8484 FLU IMMUNIZE NO ADMIN: HCPCS | Performed by: FAMILY MEDICINE

## 2021-01-01 PROCEDURE — 70450 CT HEAD/BRAIN W/O DYE: CPT

## 2021-01-01 PROCEDURE — 1036F TOBACCO NON-USER: CPT | Performed by: FAMILY MEDICINE

## 2021-01-01 PROCEDURE — 99222 1ST HOSP IP/OBS MODERATE 55: CPT | Performed by: NURSE PRACTITIONER

## 2021-01-01 PROCEDURE — 80053 COMPREHEN METABOLIC PANEL: CPT

## 2021-01-01 PROCEDURE — 85610 PROTHROMBIN TIME: CPT

## 2021-01-01 PROCEDURE — G8427 DOCREV CUR MEDS BY ELIG CLIN: HCPCS | Performed by: FAMILY MEDICINE

## 2021-01-01 PROCEDURE — 97165 OT EVAL LOW COMPLEX 30 MIN: CPT

## 2021-01-01 PROCEDURE — 93010 ELECTROCARDIOGRAM REPORT: CPT | Performed by: INTERNAL MEDICINE

## 2021-01-01 PROCEDURE — 93880 EXTRACRANIAL BILAT STUDY: CPT

## 2021-01-01 PROCEDURE — G8420 CALC BMI NORM PARAMETERS: HCPCS | Performed by: FAMILY MEDICINE

## 2021-01-01 PROCEDURE — 2580000003 HC RX 258: Performed by: HOSPITALIST

## 2021-01-01 PROCEDURE — 71046 X-RAY EXAM CHEST 2 VIEWS: CPT

## 2021-01-01 PROCEDURE — 84439 ASSAY OF FREE THYROXINE: CPT

## 2021-01-01 PROCEDURE — 93005 ELECTROCARDIOGRAM TRACING: CPT | Performed by: PEDIATRICS

## 2021-01-01 PROCEDURE — 70496 CT ANGIOGRAPHY HEAD: CPT

## 2021-01-01 PROCEDURE — 82607 VITAMIN B-12: CPT

## 2021-01-01 RX ORDER — HYDRALAZINE HYDROCHLORIDE 20 MG/ML
5 INJECTION INTRAMUSCULAR; INTRAVENOUS EVERY 4 HOURS PRN
Status: DISCONTINUED | OUTPATIENT
Start: 2021-01-01 | End: 2021-01-01

## 2021-01-01 RX ORDER — MAGNESIUM SULFATE IN WATER 40 MG/ML
2000 INJECTION, SOLUTION INTRAVENOUS PRN
Status: DISCONTINUED | OUTPATIENT
Start: 2021-01-01 | End: 2021-01-01 | Stop reason: HOSPADM

## 2021-01-01 RX ORDER — ATORVASTATIN CALCIUM 20 MG/1
TABLET, FILM COATED ORAL
Qty: 90 TABLET | Refills: 3 | Status: ON HOLD | OUTPATIENT
Start: 2021-01-01 | End: 2021-01-01 | Stop reason: HOSPADM

## 2021-01-01 RX ORDER — ZINC SULFATE 50(220)MG
50 CAPSULE ORAL DAILY
Status: DISCONTINUED | OUTPATIENT
Start: 2021-01-01 | End: 2021-01-01 | Stop reason: HOSPADM

## 2021-01-01 RX ORDER — AMLODIPINE BESYLATE 5 MG/1
TABLET ORAL
Qty: 90 TABLET | Refills: 3 | Status: ON HOLD | OUTPATIENT
Start: 2021-01-01 | End: 2021-01-01 | Stop reason: HOSPADM

## 2021-01-01 RX ORDER — METOPROLOL TARTRATE 100 MG/1
TABLET ORAL
Qty: 60 TABLET | Refills: 3 | Status: CANCELLED | OUTPATIENT
Start: 2021-01-01

## 2021-01-01 RX ORDER — ONDANSETRON 2 MG/ML
4 INJECTION INTRAMUSCULAR; INTRAVENOUS EVERY 6 HOURS PRN
Status: DISCONTINUED | OUTPATIENT
Start: 2021-01-01 | End: 2021-01-01 | Stop reason: HOSPADM

## 2021-01-01 RX ORDER — HYDROCHLOROTHIAZIDE 25 MG/1
25 TABLET ORAL DAILY
Status: DISCONTINUED | OUTPATIENT
Start: 2021-01-01 | End: 2021-01-01 | Stop reason: HOSPADM

## 2021-01-01 RX ORDER — METOPROLOL TARTRATE 100 MG/1
TABLET ORAL
Qty: 60 TABLET | Refills: 3 | Status: ON HOLD | OUTPATIENT
Start: 2021-01-01 | End: 2021-01-01 | Stop reason: HOSPADM

## 2021-01-01 RX ORDER — CEFDINIR 300 MG/1
300 CAPSULE ORAL 2 TIMES DAILY
Qty: 20 CAPSULE | Refills: 0 | Status: SHIPPED | OUTPATIENT
Start: 2021-01-01 | End: 2021-01-01

## 2021-01-01 RX ORDER — LOSARTAN POTASSIUM 100 MG/1
TABLET ORAL
Qty: 90 TABLET | Refills: 3 | Status: ON HOLD | OUTPATIENT
Start: 2021-01-01 | End: 2021-01-01 | Stop reason: HOSPADM

## 2021-01-01 RX ORDER — METHYLPREDNISOLONE 4 MG/1
TABLET ORAL
Qty: 1 KIT | Refills: 0 | Status: SHIPPED | OUTPATIENT
Start: 2021-01-01 | End: 2021-01-01

## 2021-01-01 RX ORDER — CITALOPRAM 20 MG/1
20 TABLET ORAL DAILY
Qty: 30 TABLET | Refills: 3 | Status: ON HOLD | OUTPATIENT
Start: 2021-01-01 | End: 2021-01-01 | Stop reason: HOSPADM

## 2021-01-01 RX ORDER — ATORVASTATIN CALCIUM 20 MG/1
20 TABLET, FILM COATED ORAL NIGHTLY
Status: DISCONTINUED | OUTPATIENT
Start: 2021-01-01 | End: 2021-01-01 | Stop reason: HOSPADM

## 2021-01-01 RX ORDER — LEVOTHYROXINE SODIUM 0.05 MG/1
50 TABLET ORAL DAILY
Qty: 90 TABLET | Refills: 3 | Status: ON HOLD | OUTPATIENT
Start: 2021-01-01 | End: 2021-01-01 | Stop reason: HOSPADM

## 2021-01-01 RX ORDER — ACETAMINOPHEN 325 MG/1
650 TABLET ORAL EVERY 6 HOURS PRN
Status: DISCONTINUED | OUTPATIENT
Start: 2021-01-01 | End: 2021-01-01 | Stop reason: HOSPADM

## 2021-01-01 RX ORDER — AMLODIPINE BESYLATE 10 MG/1
10 TABLET ORAL DAILY
Status: DISCONTINUED | OUTPATIENT
Start: 2021-01-01 | End: 2021-01-01 | Stop reason: HOSPADM

## 2021-01-01 RX ORDER — DEXTROSE AND SODIUM CHLORIDE 5; .9 G/100ML; G/100ML
INJECTION, SOLUTION INTRAVENOUS CONTINUOUS
Status: DISCONTINUED | OUTPATIENT
Start: 2021-01-01 | End: 2021-01-01

## 2021-01-01 RX ORDER — AMLODIPINE BESYLATE 5 MG/1
5 TABLET ORAL DAILY
Status: DISCONTINUED | OUTPATIENT
Start: 2021-01-01 | End: 2021-01-01

## 2021-01-01 RX ORDER — DOXAZOSIN 2 MG/1
1 TABLET ORAL DAILY
Status: DISCONTINUED | OUTPATIENT
Start: 2021-01-01 | End: 2021-01-01

## 2021-01-01 RX ORDER — ONDANSETRON 4 MG/1
4 TABLET, ORALLY DISINTEGRATING ORAL EVERY 8 HOURS PRN
Status: DISCONTINUED | OUTPATIENT
Start: 2021-01-01 | End: 2021-01-01 | Stop reason: HOSPADM

## 2021-01-01 RX ORDER — LEVOTHYROXINE SODIUM 0.05 MG/1
50 TABLET ORAL DAILY
Status: DISCONTINUED | OUTPATIENT
Start: 2021-01-01 | End: 2021-01-01 | Stop reason: HOSPADM

## 2021-01-01 RX ORDER — LOSARTAN POTASSIUM 100 MG/1
100 TABLET ORAL DAILY
Status: DISCONTINUED | OUTPATIENT
Start: 2021-01-01 | End: 2021-01-01 | Stop reason: HOSPADM

## 2021-01-01 RX ORDER — METOPROLOL TARTRATE 100 MG/1
TABLET ORAL
Qty: 180 TABLET | Refills: 3 | Status: ON HOLD | OUTPATIENT
Start: 2021-01-01 | End: 2021-01-01 | Stop reason: HOSPADM

## 2021-01-01 RX ORDER — ASCORBIC ACID 500 MG
500 TABLET ORAL DAILY
Status: DISCONTINUED | OUTPATIENT
Start: 2021-01-01 | End: 2021-01-01 | Stop reason: HOSPADM

## 2021-01-01 RX ORDER — ACETAMINOPHEN 650 MG/1
650 SUPPOSITORY RECTAL EVERY 6 HOURS PRN
Status: DISCONTINUED | OUTPATIENT
Start: 2021-01-01 | End: 2021-01-01 | Stop reason: HOSPADM

## 2021-01-01 RX ORDER — CITALOPRAM 20 MG/1
20 TABLET ORAL DAILY
Status: DISCONTINUED | OUTPATIENT
Start: 2021-01-01 | End: 2021-01-01 | Stop reason: HOSPADM

## 2021-01-01 RX ORDER — HYDRALAZINE HYDROCHLORIDE 20 MG/ML
10 INJECTION INTRAMUSCULAR; INTRAVENOUS EVERY 4 HOURS PRN
Status: DISCONTINUED | OUTPATIENT
Start: 2021-01-01 | End: 2021-01-01 | Stop reason: HOSPADM

## 2021-01-01 RX ORDER — BUDESONIDE AND FORMOTEROL FUMARATE DIHYDRATE 160; 4.5 UG/1; UG/1
2 AEROSOL RESPIRATORY (INHALATION) 2 TIMES DAILY
Status: DISCONTINUED | OUTPATIENT
Start: 2021-01-01 | End: 2021-01-01 | Stop reason: HOSPADM

## 2021-01-01 RX ORDER — HALOPERIDOL 5 MG/ML
1 INJECTION INTRAMUSCULAR EVERY 4 HOURS PRN
Status: DISCONTINUED | OUTPATIENT
Start: 2021-01-01 | End: 2021-01-01 | Stop reason: HOSPADM

## 2021-01-01 RX ORDER — MECOBALAMIN 5000 MCG
5 TABLET,DISINTEGRATING ORAL NIGHTLY
Status: DISCONTINUED | OUTPATIENT
Start: 2021-01-01 | End: 2021-01-01 | Stop reason: HOSPADM

## 2021-01-01 RX ORDER — OXYCODONE HYDROCHLORIDE AND ACETAMINOPHEN 5; 325 MG/1; MG/1
1 TABLET ORAL EVERY 8 HOURS PRN
Status: DISCONTINUED | OUTPATIENT
Start: 2021-01-01 | End: 2021-01-01 | Stop reason: HOSPADM

## 2021-01-01 RX ORDER — POTASSIUM CHLORIDE 7.45 MG/ML
10 INJECTION INTRAVENOUS PRN
Status: DISCONTINUED | OUTPATIENT
Start: 2021-01-01 | End: 2021-01-01 | Stop reason: HOSPADM

## 2021-01-01 RX ORDER — OMEPRAZOLE 40 MG/1
CAPSULE, DELAYED RELEASE ORAL
Qty: 90 CAPSULE | Refills: 3 | Status: ON HOLD | OUTPATIENT
Start: 2021-01-01 | End: 2021-01-01 | Stop reason: HOSPADM

## 2021-01-01 RX ORDER — LORAZEPAM 2 MG/ML
0.5 INJECTION INTRAMUSCULAR EVERY 6 HOURS PRN
Status: DISCONTINUED | OUTPATIENT
Start: 2021-01-01 | End: 2021-01-01

## 2021-01-01 RX ORDER — VITAMIN B COMPLEX
2000 TABLET ORAL DAILY
Status: DISCONTINUED | OUTPATIENT
Start: 2021-01-01 | End: 2021-01-01 | Stop reason: HOSPADM

## 2021-01-01 RX ORDER — DOXAZOSIN 2 MG/1
2 TABLET ORAL DAILY
Status: DISCONTINUED | OUTPATIENT
Start: 2021-01-01 | End: 2021-01-01 | Stop reason: HOSPADM

## 2021-01-01 RX ORDER — SODIUM CHLORIDE 0.9 % (FLUSH) 0.9 %
5-40 SYRINGE (ML) INJECTION PRN
Status: DISCONTINUED | OUTPATIENT
Start: 2021-01-01 | End: 2021-01-01 | Stop reason: HOSPADM

## 2021-01-01 RX ORDER — FEBUXOSTAT 40 MG/1
40 TABLET, FILM COATED ORAL DAILY
Qty: 90 TABLET | Refills: 3 | Status: ON HOLD | OUTPATIENT
Start: 2021-01-01 | End: 2021-01-01 | Stop reason: HOSPADM

## 2021-01-01 RX ORDER — SODIUM CHLORIDE 9 MG/ML
50 INJECTION, SOLUTION INTRAVENOUS ONCE
Status: COMPLETED | OUTPATIENT
Start: 2021-01-01 | End: 2021-01-01

## 2021-01-01 RX ORDER — SODIUM CHLORIDE 0.9 % (FLUSH) 0.9 %
5-40 SYRINGE (ML) INJECTION EVERY 12 HOURS SCHEDULED
Status: DISCONTINUED | OUTPATIENT
Start: 2021-01-01 | End: 2021-01-01 | Stop reason: HOSPADM

## 2021-01-01 RX ORDER — DOXYCYCLINE HYCLATE 100 MG
100 TABLET ORAL 2 TIMES DAILY
Qty: 20 TABLET | Refills: 0 | Status: SHIPPED | OUTPATIENT
Start: 2021-01-01 | End: 2021-01-01

## 2021-01-01 RX ORDER — METOPROLOL TARTRATE 50 MG/1
100 TABLET, FILM COATED ORAL 2 TIMES DAILY
Status: DISCONTINUED | OUTPATIENT
Start: 2021-01-01 | End: 2021-01-01 | Stop reason: HOSPADM

## 2021-01-01 RX ORDER — POLYETHYLENE GLYCOL 3350 17 G/17G
17 POWDER, FOR SOLUTION ORAL DAILY PRN
Status: DISCONTINUED | OUTPATIENT
Start: 2021-01-01 | End: 2021-01-01 | Stop reason: HOSPADM

## 2021-01-01 RX ORDER — SODIUM CHLORIDE 9 MG/ML
25 INJECTION, SOLUTION INTRAVENOUS PRN
Status: DISCONTINUED | OUTPATIENT
Start: 2021-01-01 | End: 2021-01-01 | Stop reason: HOSPADM

## 2021-01-01 RX ORDER — LORAZEPAM 2 MG/ML
1 INJECTION INTRAMUSCULAR ONCE
Status: COMPLETED | OUTPATIENT
Start: 2021-01-01 | End: 2021-01-01

## 2021-01-01 RX ADMIN — OXYCODONE HYDROCHLORIDE AND ACETAMINOPHEN 500 MG: 500 TABLET ORAL at 10:44

## 2021-01-01 RX ADMIN — PROTHROMBIN, COAGULATION FACTOR VII HUMAN, COAGULATION FACTOR IX HUMAN, COAGULATION FACTOR X HUMAN, PROTEIN C, PROTEIN S HUMAN, AND WATER 2000 UNITS: KIT at 06:01

## 2021-01-01 RX ADMIN — ZINC SULFATE 220 MG (50 MG) CAPSULE 50 MG: CAPSULE at 14:48

## 2021-01-01 RX ADMIN — ZINC SULFATE 220 MG (50 MG) CAPSULE 50 MG: CAPSULE at 10:44

## 2021-01-01 RX ADMIN — Medication 2000 UNITS: at 10:44

## 2021-01-01 RX ADMIN — SODIUM CHLORIDE, PRESERVATIVE FREE 10 ML: 5 INJECTION INTRAVENOUS at 21:02

## 2021-01-01 RX ADMIN — ENOXAPARIN SODIUM 30 MG: 100 INJECTION SUBCUTANEOUS at 10:45

## 2021-01-01 RX ADMIN — SODIUM CHLORIDE, PRESERVATIVE FREE 10 ML: 5 INJECTION INTRAVENOUS at 21:50

## 2021-01-01 RX ADMIN — CITALOPRAM HYDROBROMIDE 20 MG: 20 TABLET ORAL at 21:48

## 2021-01-01 RX ADMIN — FAMOTIDINE 10 MG: 10 INJECTION, SOLUTION INTRAVENOUS at 07:29

## 2021-01-01 RX ADMIN — METOPROLOL TARTRATE 100 MG: 50 TABLET, FILM COATED ORAL at 21:48

## 2021-01-01 RX ADMIN — FAMOTIDINE 10 MG: 10 INJECTION, SOLUTION INTRAVENOUS at 23:00

## 2021-01-01 RX ADMIN — IPRATROPIUM BROMIDE 2 PUFF: 17 AEROSOL, METERED RESPIRATORY (INHALATION) at 21:50

## 2021-01-01 RX ADMIN — DEXAMETHASONE 6 MG: 4 TABLET ORAL at 14:48

## 2021-01-01 RX ADMIN — SODIUM CHLORIDE, PRESERVATIVE FREE 10 ML: 5 INJECTION INTRAVENOUS at 07:29

## 2021-01-01 RX ADMIN — METOPROLOL TARTRATE 100 MG: 50 TABLET, FILM COATED ORAL at 01:57

## 2021-01-01 RX ADMIN — AMLODIPINE BESYLATE 5 MG: 5 TABLET ORAL at 01:57

## 2021-01-01 RX ADMIN — SODIUM CHLORIDE 50 ML: 9 INJECTION, SOLUTION INTRAVENOUS at 06:45

## 2021-01-01 RX ADMIN — PERFLUTREN 1.5 ML: 6.52 INJECTION, SUSPENSION INTRAVENOUS at 14:43

## 2021-01-01 RX ADMIN — FAMOTIDINE 10 MG: 10 INJECTION, SOLUTION INTRAVENOUS at 10:45

## 2021-01-01 RX ADMIN — APIXABAN 5 MG: 5 TABLET, FILM COATED ORAL at 21:53

## 2021-01-01 RX ADMIN — ENOXAPARIN SODIUM 30 MG: 100 INJECTION SUBCUTANEOUS at 14:57

## 2021-01-01 RX ADMIN — IPRATROPIUM BROMIDE 2 PUFF: 17 AEROSOL, METERED RESPIRATORY (INHALATION) at 12:45

## 2021-01-01 RX ADMIN — BUDESONIDE AND FORMOTEROL FUMARATE DIHYDRATE 2 PUFF: 160; 4.5 AEROSOL RESPIRATORY (INHALATION) at 08:56

## 2021-01-01 RX ADMIN — IPRATROPIUM BROMIDE 2 PUFF: 17 AEROSOL, METERED RESPIRATORY (INHALATION) at 08:56

## 2021-01-01 RX ADMIN — LOSARTAN POTASSIUM 100 MG: 100 TABLET, FILM COATED ORAL at 10:44

## 2021-01-01 RX ADMIN — Medication 2000 UNITS: at 14:48

## 2021-01-01 RX ADMIN — ATORVASTATIN CALCIUM 20 MG: 20 TABLET, FILM COATED ORAL at 21:48

## 2021-01-01 RX ADMIN — DEXAMETHASONE 6 MG: 4 TABLET ORAL at 10:44

## 2021-01-01 RX ADMIN — FAMOTIDINE 10 MG: 10 INJECTION, SOLUTION INTRAVENOUS at 21:49

## 2021-01-01 RX ADMIN — IPRATROPIUM BROMIDE 2 PUFF: 17 AEROSOL, METERED RESPIRATORY (INHALATION) at 21:02

## 2021-01-01 RX ADMIN — SODIUM CHLORIDE, PRESERVATIVE FREE 10 ML: 5 INJECTION INTRAVENOUS at 10:45

## 2021-01-01 RX ADMIN — HYDRALAZINE HYDROCHLORIDE 5 MG: 20 INJECTION INTRAMUSCULAR; INTRAVENOUS at 17:52

## 2021-01-01 RX ADMIN — BUDESONIDE AND FORMOTEROL FUMARATE DIHYDRATE 2 PUFF: 160; 4.5 AEROSOL RESPIRATORY (INHALATION) at 21:50

## 2021-01-01 RX ADMIN — METOPROLOL TARTRATE 100 MG: 50 TABLET, FILM COATED ORAL at 10:44

## 2021-01-01 RX ADMIN — IPRATROPIUM BROMIDE 2 PUFF: 17 AEROSOL, METERED RESPIRATORY (INHALATION) at 17:12

## 2021-01-01 RX ADMIN — LORAZEPAM 0.5 MG: 2 INJECTION INTRAMUSCULAR; INTRAVENOUS at 21:48

## 2021-01-01 RX ADMIN — DOXAZOSIN 2 MG: 4 TABLET ORAL at 10:44

## 2021-01-01 RX ADMIN — HALOPERIDOL LACTATE 1 MG: 5 INJECTION, SOLUTION INTRAMUSCULAR at 23:39

## 2021-01-01 RX ADMIN — OXYCODONE HYDROCHLORIDE AND ACETAMINOPHEN 500 MG: 500 TABLET ORAL at 15:04

## 2021-01-01 RX ADMIN — LORAZEPAM 1 MG: 2 INJECTION INTRAMUSCULAR; INTRAVENOUS at 11:45

## 2021-01-01 ASSESSMENT — ENCOUNTER SYMPTOMS
GASTROINTESTINAL NEGATIVE: 1
ALLERGIC/IMMUNOLOGIC NEGATIVE: 1
ALLERGIC/IMMUNOLOGIC NEGATIVE: 1
GASTROINTESTINAL NEGATIVE: 1
COUGH: 1
EYES NEGATIVE: 1
EYES NEGATIVE: 1
RESPIRATORY NEGATIVE: 1
ALLERGIC/IMMUNOLOGIC NEGATIVE: 1
RESPIRATORY NEGATIVE: 1
GASTROINTESTINAL NEGATIVE: 1
EYES NEGATIVE: 1

## 2021-01-01 ASSESSMENT — PAIN SCALES - GENERAL: PAINLEVEL_OUTOF10: 0

## 2021-02-03 NOTE — PROGRESS NOTES
SUBJECTIVE:    Patient ID: Brown Louis is a 68 y. o.female. HPI:   Here for evaluation of multiple medical problems  Patient is a 72-year-old white female. She complains of a couple of weeks history of cough congestion and just general malaise. She said that she has been like this most of the winter. But it seems to be getting worse in the last 2 weeks. She is a poor historian. No fevers no chills. She also has depression. She said her son was arrested and is in nursing home now. She is being crying nonstop since this happened. She cannot control herself. She feels like doing nothing. Not suicidal not homicidal.       Past Medical History:   Diagnosis Date    Blindness 2014    left eye due to sroke in 2013- peter     Chronic GERD 4/26/2017    Chronic kidney disease     dr Jean Baptiste Gal pain     dr Kamilah Wolfe Headache     Hypertension     Hypothyroidism     Idiopathic peripheral neuropathy 8/22/2019    Low back pain 6/18/2020    Osteoarthritis     BILATERAL FEET    Osteoporosis     refuse treatment     Restless legs syndrome 8/22/2019    Stroke [facility]) 2013    eventually lost left eye sight due to this- dr Georgana Kocher       Current Outpatient Medications   Medication Sig Dispense Refill    cefdinir (OMNICEF) 300 MG capsule Take 1 capsule by mouth 2 times daily for 10 days 20 capsule 0    methylPREDNISolone (MEDROL DOSEPACK) 4 MG tablet Take by mouth.  1 kit 0    citalopram (CELEXA) 20 MG tablet Take 1 tablet by mouth daily 30 tablet 3    Walking Boot MISC by Does not apply route Right distal fibula fx and right 2nd & 3rd toe fractures 1 each 0    losartan (COZAAR) 100 MG tablet TAKE 1 TABLET DAILY 90 tablet 3    levothyroxine (SYNTHROID) 50 MCG tablet Take 1 tablet by mouth Daily 90 tablet 0    atorvastatin (LIPITOR) 20 MG tablet TAKE 1 TABLET NIGHTLY 90 tablet 3    febuxostat (ULORIC) 40 MG TABS tablet TAKE 1 TABLET DAILY 90 tablet 3    omeprazole (PRILOSEC) 40 MG delayed release capsule TAKE 1 CAPSULE DAILY. SWALLOW WHOLE. DO NOT CHEW OR CRUSH 90 capsule 3    metoprolol (LOPRESSOR) 100 MG tablet TAKE 1 TABLET TWICE A  tablet 3    cyclobenzaprine (FLEXERIL) 5 MG tablet Take 1 tablet by mouth 2 times daily as needed for Muscle spasms 180 tablet 0    amLODIPine (NORVASC) 5 MG tablet TAKE 1 TABLET DAILY 90 tablet 3    PROLIA 60 MG/ML SOSY SC injection       doxazosin (CARDURA) 1 MG tablet 1 mg      Cholecalciferol (VITAMIN D PO) Take 1,000 mg by mouth daily       oxyCODONE-acetaminophen (PERCOCET) 5-325 MG per tablet   0    chlorthalidone (HYGROTON) 25 MG tablet TAKE ONE TABLET BY MOUTH DAILY (Patient taking differently: daily as needed TAKE ONE TABLET BY MOUTH DAILY) 90 tablet 3    B Complex-C (SUPER B COMPLEX PO) Take by mouth      CALCIUM PO Take by mouth      Nebulizers ([employer] COMPACT MINI NEBULIZER) MISC As directed 1 each 0    albuterol (PROVENTIL) (2.5 MG/3ML) 0.083% nebulizer solution Take 3 mLs by nebulization every 6 hours as needed for Wheezing 120 each 3    Misc Natural Products (GLUCOSAMINE CHOND COMPLEX/MSM PO) Take by mouth      aspirin 81 MG chewable tablet Take 1 tablet by mouth daily 30 tablet 0    Multiple Vitamins-Minerals (WOMENS 50+ MULTI VITAMIN/MIN PO) Take by mouth      Probiotic Product (PROBIOTIC DAILY PO) Take by mouth      Biotin 5000 MCG TABS Take by mouth daily as needed        No current facility-administered medications for this visit. Allergies   Allergen Reactions    Actonel  [Risedronate Sodium]     Clarithromycin     Clonidine     Fosamax  [Alendronate Sodium]     Gabapentin     Lisinopril     Meloxicam     Nsaids     Phenergan [Promethazine] Anxiety       Review of Systems   Constitutional: Negative. HENT: Positive for congestion. Eyes: Negative. Respiratory: Positive for cough. Cardiovascular: Negative. Gastrointestinal: Negative. Endocrine: Negative. Genitourinary: Negative.     Musculoskeletal: Negative. Skin: Negative. Allergic/Immunologic: Negative. Neurological: Negative. Hematological: Negative. Psychiatric/Behavioral: Negative. OBJECTIVE:    Physical Exam  Vitals signs reviewed. Constitutional:       Appearance: She is well-developed. HENT:      Head: Normocephalic and atraumatic. Right Ear: External ear normal.      Left Ear: External ear normal.      Nose: Nose normal.   Eyes:      Conjunctiva/sclera: Conjunctivae normal.      Pupils: Pupils are equal, round, and reactive to light. Neck:      Musculoskeletal: Normal range of motion and neck supple. Cardiovascular:      Rate and Rhythm: Normal rate and regular rhythm. Heart sounds: Normal heart sounds. Pulmonary:      Effort: Pulmonary effort is normal.      Breath sounds: Wheezing present. Abdominal:      General: Bowel sounds are normal.      Palpations: Abdomen is soft. Musculoskeletal: Normal range of motion. Skin:     General: Skin is warm and dry. Neurological:      Mental Status: She is alert and oriented to person, place, and time. Gait: Gait abnormal.      Deep Tendon Reflexes: Reflexes are normal and symmetric. Comments: Mild left side weakness    Psychiatric:         Behavior: Behavior normal.         Thought Content: Thought content normal.         Judgment: Judgment normal.        Pulse 75   SpO2 98%      ASSESSMENT:     Diagnosis Orders   1. Bronchitis versus COPD versus COVID-19 cefdinir (OMNICEF) 300 MG capsule    methylPREDNISolone (MEDROL DOSEPACK) 4 MG tablet    COVID-19   2. Depression, unspecified depression type-new diagnosis citalopram (CELEXA) 20 MG tablet        PLAN:    1. Omnicef. Medrol Dosepak. Test for Covid. May need PFTs. 2.  Trial of Celexa. Follow-up in 2 weeks at that time she probably will need blood work.

## 2021-06-08 NOTE — PROGRESS NOTES
SUBJECTIVE:    Patient ID: Pearl Barney is a 68 y. o.female. HPI:   Patient here for follow-up of multiple medical problems. Patient is a 74-year-old white female. She have a chronic cough since the winter. Her cough is productive. She says whitish to yellowish. She is a former smoker. No significant shortness of breath. She also have hypertension in combination with chronic kidney disease. She also have hyperlipidemia. Take cholesterol medication. Her blood pressure is well controlled. Denies medication side effect. Past Medical History:   Diagnosis Date    Blindness 2014    left eye due to sroke in 2013- peter     Chronic GERD 4/26/2017    Chronic kidney disease     dr Zev Estrada pain     dr Carlos Alberto Weston Headache     Hypertension     Hypothyroidism     Idiopathic peripheral neuropathy 8/22/2019    Low back pain 6/18/2020    Osteoarthritis     BILATERAL FEET    Osteoporosis     refuse treatment     Restless legs syndrome 8/22/2019    Stroke [facility]) 2013    eventually lost left eye sight due to this- dr Hyun Walker       Current Outpatient Medications   Medication Sig Dispense Refill    doxycycline hyclate (VIBRA-TABS) 100 MG tablet Take 1 tablet by mouth 2 times daily for 10 days 20 tablet 0    amLODIPine (NORVASC) 5 MG tablet TAKE 1 TABLET DAILY 90 tablet 3    citalopram (CELEXA) 20 MG tablet Take 1 tablet by mouth daily 30 tablet 3    Walking Boot MISC by Does not apply route Right distal fibula fx and right 2nd & 3rd toe fractures 1 each 0    losartan (COZAAR) 100 MG tablet TAKE 1 TABLET DAILY 90 tablet 3    atorvastatin (LIPITOR) 20 MG tablet TAKE 1 TABLET NIGHTLY 90 tablet 3    febuxostat (ULORIC) 40 MG TABS tablet TAKE 1 TABLET DAILY 90 tablet 3    omeprazole (PRILOSEC) 40 MG delayed release capsule TAKE 1 CAPSULE DAILY.       SWALLOW WHOLE. DO NOT CHEW OR CRUSH 90 capsule 3    metoprolol (LOPRESSOR) 100 MG tablet TAKE 1 TABLET TWICE A  tablet 3    cyclobenzaprine (FLEXERIL) 5 MG tablet Take 1 tablet by mouth 2 times daily as needed for Muscle spasms 180 tablet 0    PROLIA 60 MG/ML SOSY SC injection       doxazosin (CARDURA) 1 MG tablet 1 mg      Cholecalciferol (VITAMIN D PO) Take 1,000 mg by mouth daily       oxyCODONE-acetaminophen (PERCOCET) 5-325 MG per tablet   0    chlorthalidone (HYGROTON) 25 MG tablet TAKE ONE TABLET BY MOUTH DAILY (Patient taking differently: daily as needed TAKE ONE TABLET BY MOUTH DAILY) 90 tablet 3    B Complex-C (SUPER B COMPLEX PO) Take by mouth      CALCIUM PO Take by mouth      Nebulizers ([employer] COMPACT MINI NEBULIZER) MISC As directed 1 each 0    albuterol (PROVENTIL) (2.5 MG/3ML) 0.083% nebulizer solution Take 3 mLs by nebulization every 6 hours as needed for Wheezing 120 each 3    Misc Natural Products (GLUCOSAMINE CHOND COMPLEX/MSM PO) Take by mouth      aspirin 81 MG chewable tablet Take 1 tablet by mouth daily 30 tablet 0    Multiple Vitamins-Minerals (WOMENS 50+ MULTI VITAMIN/MIN PO) Take by mouth      Probiotic Product (PROBIOTIC DAILY PO) Take by mouth      Biotin 5000 MCG TABS Take by mouth daily as needed       levothyroxine (SYNTHROID) 50 MCG tablet Take 1 tablet by mouth Daily (Patient not taking: Reported on 6/8/2021) 90 tablet 0     No current facility-administered medications for this visit. Allergies   Allergen Reactions    Actonel  [Risedronate Sodium]     Clarithromycin     Clonidine     Fosamax  [Alendronate Sodium]     Gabapentin     Lisinopril     Meloxicam     Nsaids     Phenergan [Promethazine] Anxiety       Review of Systems   Constitutional: Negative. HENT: Negative. Eyes: Negative. Respiratory: Negative. Cardiovascular: Negative. Gastrointestinal: Negative. Endocrine: Negative. Genitourinary: Negative. Musculoskeletal: Negative. Skin: Negative. Allergic/Immunologic: Negative. Neurological: Negative. Hematological: Negative. Psychiatric/Behavioral: Negative. OBJECTIVE:    Physical Exam  Constitutional:       Appearance: Normal appearance. She is well-developed and well-groomed. HENT:      Head: Normocephalic and atraumatic. Right Ear: Tympanic membrane, ear canal and external ear normal. There is no impacted cerumen. Left Ear: Tympanic membrane, ear canal and external ear normal. There is no impacted cerumen. Nose: Nose normal.      Mouth/Throat:      Lips: Pink. Mouth: Mucous membranes are moist.      Dentition: Normal dentition. Pharynx: Oropharynx is clear. Uvula midline. Eyes:      General: Lids are normal.         Right eye: No discharge. Left eye: No discharge. Extraocular Movements: Extraocular movements intact. Conjunctiva/sclera: Conjunctivae normal.      Right eye: Right conjunctiva is not injected. Left eye: Left conjunctiva is not injected. Pupils: Pupils are equal, round, and reactive to light. Neck:      Thyroid: No thyromegaly. Vascular: No carotid bruit or JVD. Cardiovascular:      Rate and Rhythm: Normal rate and regular rhythm. Pulses: Normal pulses. Carotid pulses are 2+ on the right side and 2+ on the left side. Radial pulses are 2+ on the right side and 2+ on the left side. Heart sounds: Normal heart sounds, S1 normal and S2 normal. No murmur heard. Pulmonary:      Effort: Pulmonary effort is normal.      Breath sounds: Normal breath sounds. Abdominal:      General: Bowel sounds are normal. There is no distension or abdominal bruit. Palpations: Abdomen is soft. There is no mass. Hernia: No hernia is present. Musculoskeletal:         General: Normal range of motion. Cervical back: Full passive range of motion without pain, normal range of motion and neck supple. Right lower leg: No edema. Left lower leg: No edema. Lymphadenopathy:      Cervical: No cervical adenopathy.       Right cervical: No superficial cervical adenopathy. Left cervical: No superficial cervical adenopathy. Upper Body:      Right upper body: No supraclavicular adenopathy. Left upper body: No supraclavicular adenopathy. Skin:     General: Skin is warm and dry. Coloration: Skin is not pale. Findings: No lesion or rash. Nails: There is no clubbing. Neurological:      Mental Status: She is alert and oriented to person, place, and time. Motor: No weakness or tremor. Coordination: Coordination normal.      Deep Tendon Reflexes: Reflexes are normal and symmetric. Psychiatric:         Attention and Perception: Attention normal.         Mood and Affect: Mood normal.         Speech: Speech normal.         Behavior: Behavior normal. Behavior is cooperative. Thought Content: Thought content normal.         Cognition and Memory: Cognition and memory normal.         Judgment: Judgment normal.        /74 (Site: Left Upper Arm, Position: Sitting, Cuff Size: Medium Adult)   Pulse 68   Temp 97.5 °F (36.4 °C) (Temporal)   Ht 5' 6\" (1.676 m)   Wt 145 lb 14.4 oz (66.2 kg)   SpO2 98%   BMI 23.55 kg/m²      ASSESSMENT:     Diagnosis Orders   1. Chronic cough-COPD? XR CHEST STANDARD (2 VW)    Sammy Rubalcava MD, Pulmonary Disease, [facility]    doxycycline hyclate (VIBRA-TABS) 100 MG tablet   2. Stage 4 chronic kidney disease (HCC)-need blood work    3. Essential hypertension-stable CBC    Comprehensive Metabolic Panel    Lipid Panel    TSH without Reflex    Urinalysis   4. Hyperlipidemia, unspecified hyperlipidemia type-on therapy         PLAN:    1. Doxycycline. Medrol Dosepak. Chest x-ray. Refer to pulmonary. May benefit from PFTs  2.  Blood work  3. Continue medication obtain blood work.   4.  Continue medication  Follow-up after seen by pulmonary

## 2021-10-11 NOTE — TELEPHONE ENCOUNTER
----- Message from Manjeet Dobbins sent at 10/11/2021  9:52 AM CDT -----  Subject: Message to Provider    QUESTIONS  Information for Provider? Pt is in need of new signatures AND refills sent   on all of her medications sent to the [place] [id number] [phone] - F [phone]. Pt   received a letter from the funds of [facility] pharmacy. Pt said that she can   bring the letter into office if you need to see it. If you have any   questions just called pt and let her kow. Also call pt and let her know   that her refill request for Metroprolol is approved.   ---------------------------------------------------------------------------  --------------  CALL BACK INFO  What is the best way for the office to contact you? OK to leave message on   voicemail  Preferred Call Back Phone Number? [phone]  ---------------------------------------------------------------------------  --------------  SCRIPT ANSWERS  Relationship to Patient?  Self

## 2021-10-11 NOTE — TELEPHONE ENCOUNTER
----- Message from Manjeet Shilpi sent at 10/11/2021  9:48 AM CDT -----  Subject: Refill Request    QUESTIONS  Name of Medication? metoprolol (LOPRESSOR) 100 MG tablet  Patient-reported dosage and instructions? 1 tablet in morning and 1 tablet   at night  How many days do you have left? 0  Preferred Pharmacy? [id number] [facility] [place] phone number (if available)? [phone]  Additional Information for Provider? New signature is needed on this rx   for pt and pt is requesting a 30 day supply of this rx. Please send a   refill to pharmacy for pt. Pt has 0 days of rx left.   ---------------------------------------------------------------------------  --------------  CALL BACK INFO  What is the best way for the office to contact you? OK to leave message on   voicemail  Preferred Call Back Phone Number?  [phone]

## 2021-12-21 PROBLEM — G45.9 TRANSIENT ISCHEMIC ATTACK: Status: ACTIVE | Noted: 2021-01-01

## 2021-12-21 PROBLEM — G45.9 TIA (TRANSIENT ISCHEMIC ATTACK): Status: ACTIVE | Noted: 2021-01-01

## 2021-12-21 PROBLEM — U07.1 COVID-19: Status: ACTIVE | Noted: 2021-01-01

## 2021-12-21 NOTE — PROGRESS NOTES
Speech Language Pathology  Facility/Department: [facility] 4 ONCOLOGY UNIT   CLINICAL BEDSIDE SWALLOW EVALUATION    NAME: Davina Steele  : 1940  MRN: [id number]    ADMISSION DATE: 2021  ADMITTING DIAGNOSIS: has Essential hypertension; CKD (chronic kidney disease); Acquired hypothyroidism; CVD (cardiovascular disease); Primary osteoarthritis involving multiple joints; Foot pain; Osteoporosis; Ataxia; Weakness; Non-intractable vomiting with nausea; Chest pain; Blindness of left eye; Dysarthria; Nonintractable headache; Acute kidney injury ([address] Cerebrovascular accident (CVA) ([address] Gait abnormality; Visual disturbance; Recurrent falls; Chronic GERD; Nausea and vomiting; Pseudobulbar affect; Left leg pain; Decreased dorsalis pedis pulse; Closed intertrochanteric fracture of hip, left, initial encounter ([address] Pain in both lower extremities; Restless legs syndrome; Idiopathic peripheral neuropathy; Small vessel disease, cerebrovascular; Low back pain; History of stroke; TIA (transient ischemic attack); COVID-19; and Transient ischemic attack on their problem list.      Date of Eval: 2021  Evaluating Therapist: ANJANA Andrade    Current Diet level:  Current Diet : Dysphagia Soft and Bite-Sized (Dysphagia III)  Current Liquid Diet : Thin      Primary Complaint  Chewing/swallowing    Reason for Referral  Davina Steele was referred for a bedside swallow evaluation to assess the efficiency of her swallow function, identify signs and symptoms of aspiration and make recommendations regarding safe dietary consistencies, effective compensatory strategies, and safe eating environment. Impression  Patients swallow function assessed. Patient presenting with slow oral prep and mastication for more solid foods. Increased lethargy and fatigue noted. Did note fast oral transit with thin liquids.  Patient presenting with mildly sluggish/decreased laryngeal elevation for airway protection, however no overt s/s of aspiration observed with any regular solid, minced and moist, nectar thick liquids or thin liquids observed. At this time secondary to lethargy and fatigue recommend minced and moist diet with nectar thick liquids. Patient may have small sips of H2O IN BETWEEN MEALS. Treatment Plan  Requires SLP Intervention: Yes    Recommended Diet and Intervention  Diet Solids Recommendation: Dysphagia Minced and Moist (Dysphagia II)  Liquid Consistency Recommendation: Mildly Thick (Nectar)  Recommended Form of Meds: Whole with puree          Compensatory Swallowing Strategies  Compensatory Swallowing Strategies: Alternate solids and liquids;Upright as possible for all oral intake;Small bites/sips; Remain upright for 30-45 minutes after meals;Eat/Feed slowly    Treatment/Goals   The patient will tolerate minced and moist diet with nectar thick liquids with min/no overt s/s of aspiration. Reassessment of swallow function for potential upgrade    General  Chart Reviewed: Yes  Behavior/Cognition: Lethargic;Confused  O2 Device: None (Room air)  Communication Observation:  (Patient not alert to place, time or overall situation. Nurse gave ativan prior to bedside which could be the reason for the drowsiness). Slightly slurred speech was noted, however did not impact speech intelligibility. Follows Directions: Simple  Dentition:  (Dentures)  Patient Positioning: Upright in bed  Consistencies Administered: Dysphagia Minced and Moist (Dysphagia II); Dysphagia Pureed (Dysphagia I); Reg solid; Thin;Nectar - cup      Oral Phase Dysfunction  Oral Phase  Oral Phase: Exceptions  Oral Phase  Oral Phase - Comment: Patient presenting with prolonged oral prep and mastication for more solid consistencies. Min residue post swallow which required liquid wash to clear.      Indicators of Pharyngeal Phase Dysfunction   Pharyngeal Phase  Pharyngeal Phase: Exceptions  Pharyngeal Phase   Pharyngeal: Mildly decreased/sluggish laryngeal elevation for airway protection, however no overt s/s of aspiration observed with any regular solid, minced and moist, puree, nectar thick  or thin liquids administered    Electronically signed by ANJANA Sierra on 12/21/2021 at 2:29 PM

## 2021-12-21 NOTE — ACP (ADVANCE CARE PLANNING)
Advance Care Planning     Advance Care Planning Activator (Inpatient)  Conversation Note      Date of ACP Conversation: 12/21/2021     Conversation Conducted with: Patient's son Arlester Riedel    ACP Activator: Lynsey Brown      Current Designated Health Care Decision Maker:     Primary Decision Maker: Daya Brown Child - [phone]      Care Preferences    Ventilation: \"If you were in your present state of health and suddenly became very ill and were unable to breathe on your own, what would your preference be about the use of a ventilator (breathing machine) if it were available to you? \"      Would the patient desire the use of ventilator (breathing machine)?: Yes    \"If your health worsens and it becomes clear that your chance of recovery is unlikely, what would your preference be about the use of a ventilator (breathing machine) if it were available to you? \"     Would the patient desire the use of ventilator (breathing machine)?: Yes      Resuscitation  \"CPR works best to restart the heart when there is a sudden event, like a heart attack, in someone who is otherwise healthy. Unfortunately, CPR does not typically restart the heart for people who have serious health conditions or who are very sick. \"    \"In the event your heart stopped as a result of an underlying serious health condition, would you want attempts to be made to restart your heart (answer \"yes\" for attempt to resuscitate) or would you prefer a natural death (answer \"no\" for do not attempt to resuscitate)? \" Yes       [] Yes   [x] No   Educated Patient / Lawrnce Mater regarding differences between Advance Directives and portable DNR orders.        Conversation Outcomes:  [x] ACP discussion completed    Electronically signed by Lynsey Brown on 12/21/2021 at 3:06 PM

## 2021-12-21 NOTE — Clinical Note
Patient Class: Inpatient [101]   REQUIRED: Diagnosis: TIA (transient ischemic attack) [538058]   Estimated Length of Stay: Estimated stay of more than 2 midnights   Future Attending Provider: Mak Vasquez [[id number]]

## 2021-12-21 NOTE — ED NOTES
Daily Note     Today's date: 2021  Patient name: Jama Vee  : 1945  MRN: [id number]  Referring provider: Aissatou Chauhan DO  Dx:   Encounter Diagnosis     ICD-10-CM    1  Cervical radiculopathy  M54 12    2  Neck tightness  R29 898                   Subjective: Pt presents today stating that he is feeling well, no residual soreness, admits HEP not as frequent as he had hoped  Objective: See treatment diary below      Assessment: Proceeded with outlined activities without symptom exacerbation  ROM is also improving  Continue to progress as able  Precautions: Hx of R shoulder Skin CA  Manuals            PA C2-7 G2-4  10                                                   Neuro Re-Ed             Postural Ed 10 min            SA Punch  2 x 10           Tband Row + ext   GTB          Tband Postural    RTB          Scaption with Postural Awareness  2 x 10                                      Ther Ex             CS retraction  10 x 4 10 x 4           CS retract OP 10 x  10 x 1           CS op progression? Scap Add  10" x 10           Supine Retraction  2 x 10           Snags (towel) B  6" x 10                                     Ther Activity                          Golf Putting Review             Gait Training                                       Modalities             HP/CP prn to Neck     HP x 10 min to CS Patient to MRI     Clearance YARY Ferraro  12/21/21 [id number]

## 2021-12-21 NOTE — H&P
[place], [place], [address]    DEPARTMENT OF HOSPITALIST MEDICINE      HISTORY & PHYSICAL:          REASON FOR ADMISSION:  Chief Complaint   Patient presents with    Speech Problem     pt presents to ED with c/o fall pt hit head on floor AMS after hitting head at 0500    Fall    Altered Mental Status        HISTORY OF PRESENT ILLNESS:  Karan Millan is an 80 y.o. 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 Patient significant other was diagnosed with Covid on Friday (12/17/2021). The next day patient began having profound fatigue, cough and body aches. Reports possibly having a temperature but did not check. She has maintained her sense of taste and smell. Family was trying to procure her monoclonal antibody infusion but that had not been set up yet. Her testing was done with a home test.  On morning of admission patient got up out of bed and states as she attempted to go to the kitchen and her gait became very rapidly leaning forward faster than she meant to go. She reached see refrigerator she held onto the handle and fell backwards hitting striking the back of her head. He did not have full loss of consciousness but her family called EMS because they noted slurred speech in addition to the fall. Patient speech alert rapidly cleared early in her ER visit. Patient has had a previous CVA in 2013 that left her blind in her left eye. On exam she is awake alert looks chronically ill and older than her stated age. Left pupil nonresponsive right is small and response to light. I did take the small decrease in strength on the right versus the left the upper extremities.   No cough noted during exam breath sounds are diminished throughout    PAST MEDICAL HISTORY:  Past Medical History:   Diagnosis Date    Blindness 2014    left eye due to sroke in 2013- [facility] Expenses: Not on file   Food Insecurity:     Worried About Running Out of Food in the Last Year: Not on file    Dixon of Food in the Last Year: Not on file   Transportation Needs:     Lack of Transportation (Medical): Not on file    Lack of Transportation (Non-Medical): Not on file   Physical Activity:     Days of Exercise per Week: Not on file    Minutes of Exercise per Session: Not on file   Stress:     Feeling of Stress : Not on file   Social Connections:     Frequency of Communication with Friends and Family: Not on file    Frequency of Social Gatherings with Friends and Family: Not on file    Attends Confucianist Services: Not on file    Active Member of Clubs or Organizations: Not on file    Attends Club or Organization Meetings: Not on file    Marital Status: Not on file   Intimate Partner Violence:     Fear of Current or Ex-Partner: Not on file    Emotionally Abused: Not on file    Physically Abused: Not on file    Sexually Abused: Not on file   Housing Stability:     Unable to Pay for Housing in the Last Year: Not on file    Number of Places Lived in the Last Year: Not on file    Unstable Housing in the Last Year: Not on file        FAMILY HISTORY:  Family History   Problem Relation Age of Onset    Diabetes Mother     Stroke Mother     Diabetes Sister     Stroke Sister     Diabetes Brother     Stroke Brother     Colon Cancer Neg Hx     Colon Polyps Neg Hx     Esophageal Cancer Neg Hx     Liver Cancer Neg Hx     Liver Disease Neg Hx     Stomach Cancer Neg Hx     Rectal Cancer Neg Hx          ALLERGIES:  Allergies   Allergen Reactions    Actonel  [Risedronate Sodium]     Clarithromycin     Clonidine     Fosamax  [Alendronate Sodium]     Gabapentin     Lisinopril     Meloxicam     Nsaids     Phenergan [Promethazine] Anxiety            REVIEW OF SYSTEMS:  Constitutional:  No fevers, chills, nausea, vomiting, + tiredness & fatigue   Head:  No head injury, facial trauma. No bruising or swelling from the fall   Eyes:  No acute visual changes, exudate, trauma   Ears:  No acute hearing loss, earaches   Nose: No nasal discharge, epistaxis   Neck: No new hoarseness, voice change, or new masses   Lungs:   No hemoptysis, pleurisy   Heart:  No chest pressure with exertion, palpitations,    Abdomen:   No new masses, no bright red blood per rectum   Extremities: No acute pain while ambulating, no new lesions   Skin: No new changes in skin color, no rashes or lesions   Neurologic: No new motor or sensory changes     14 point review of systems addressed with patient which is essentially negative except as specifically addressed above:    PHYSICAL EXAM:  BP (!) 145/111   Pulse 78   Temp 98.6 °F (37 °C) (Oral)   Resp 16   Ht 5' 6\" (1.676 m)   Wt 146 lb (66.2 kg)   SpO2 98%   BMI 23.57 kg/m²   No intake/output data recorded.       PHYSICAL EXAMINATION:    Vital Signs: Please see the chart   GLENN:  Awake, alert, oriented x 3, patient appears tired and fatigued   Head/Eyes:  Normocephalic, atraumatic, EOMI and right pupil responsive left not   ENT: Moist mucous membranes, nasal passages clear   Neck: Supple, full range of motion, no carotid bruit, trachea midline   Respiratory:   Bilateral fair air entry in both lung fields, mild B/L crackles, symmetric expansion of chest diminished   Cardiovascular:  Regular rate and rhythm, S1+S2+0, no murmurs/rubs   Urology: No bilateral CVA tenderness, no suprapubic tenderness   Abdomen:   Soft, non-tender, bowel sounds +ve, no organomegaly   Muscle/Joints: Moves all, full range of motion, no muscle spasms   Extremities: No clubbing, no cyanosis, no calf tenderness, no edema   Pulses: 2+ bilaterally, symmetrical   Skin: Warm, dry, no pallor/cyanosis/jaundice, no rashes/lesions   Neurologic: Awake, alert, oriented x 3, cranial nerves II-XII intact, no focal neurological deficits, sensory system intact   Psychiatric: Normal mood, non-suicidal Dr Gaspar Life and Plan: Active Problems:  TIA   Admit symmetry   Neurology consult   Permissive blood pressure   Neurochecks every 2 for 24 then every 4   Fall and seizure precaution   PT OT and speech consult   Echo   Vascular consult given carotid stenosis   Antiplatelet therapy   Statin therapy    Essential hypertension   Permissive hypertension    CKD (chronic kidney disease)   Serial labs   Hydration D5 normal saline 75 an hour    Acquired hypothyroidism   Not well controlled   Awaiting med rec to see if she is on thyroid replacement    CVD (cardiovascular disease)   Noted    Primary osteoarthritis involving multiple joints   Noted    Blindness of left eye   History since 2013  Covid 19   Droplet isolation   Decadron 6 daily   DVT prophylaxis   Aspiratory event   Symbicort and Atrovent inhaler   Incentive spirometry   Lactic acid, CRP, sed rate  \ Vitamin C D and zinc      Resolved Problems:    * No resolved hospital problems. *           Repeat labs in a.m. Electrolyte replacement as per protocol. Patient will be monitored very closely on the floor. Further recommendations as per the hospital course. Patient's management will be taken over by our [facility] Hospitalist Team in am.    Patient  is on DVT prophylaxis  Current medications reviewed  Lab work reviewed  Radiology/Chest x-ray films reviewed  Discussed with the nurse and addressed all questions/concerns  Discussed with Patient and/or Family at the bedside in detail . .. they verbalize understanding and agree with the management plan. Attestation:  Inpatient status is used for patients with an expected LOS extending past two midnights due to medical therapy and/or critical care needs  . .. all other patients are placed under OBServation status. APRYL Jain CNP  9:53 AM 12/21/2021      DISCLAIMER: This note was created with electronic voice recognition which does have occasional errors.   If you have any questions regarding the content within the note please do not hesitate to contact me. .. Thanks.

## 2021-12-21 NOTE — CARE COORDINATION
Initial CM Assessment    Initial Assessment Completed at bedside with:    []   Patient  [x]   Family/Caregiver/Guardian -pt's son   []   Other:      Patient Contact Information:  [address] [phone] (home)    Physical: [address], [address]   Above information verified? [x]   Yes  []   No    ADLS:    Pt was independent pta - sometimes uses a cane   Support System:   Children,Family Members,[employer] - [facility] to [facility] to current [facility]:   []   Yes  []   No     D/C Plan if not returning home:    Transportation plan for Discharge:      Do you have any unmet social needs that would keep you from returning home safely:  []   Yes  [x]   No              Unmet Social Needs Notes:       Had [employer] [facility] prior to admission:    []   Yes  [x]   No    Currently ACTIVE with [address]:    []   Yes  [x]   No - had [facility] after last stroke and when she broke her leg in 2019  []   Interested at discharge  [facility]:      Current PCP:  Colt Lange MD  PCP verified? [x]   Yes  []   No    Have you been vaccinated for COVID-19 (SARS-CoV-2)? []   Yes  [x]   No                   If so, when? Which :  []   Pfizer-BioNTNouveaux Riche  []   Moderna  []   Latia Frank  []   Other:       Pharmacy:    [address] [facility]. [address] [place], [address] [address]  Phone: [phone] Fax: [phone]     [place], [address] [facility] [phone] [facility] [phone]  [place] [address]  Phone: [phone] Fax: [phone]    Prefer to use Meds to Bed? []   Yes  [x]   No  Potential assistance purchasing medications?      []   Yes  [x]   No    Active with HD/PD prior to admission:           []   Yes  [x]   No  HD Center:       Financial:  Payor: MEDICARE / Plan: MEDICARE PART A AND B / Product Type: *No Product type* /     Pre-Cert required for SNF:   []   Yes  [x]   No    Patient Deficits:  []   Yes   [x]   No    If yes:  []   Confusion/Memory  []   Visual  []   Motor/Sensory         []   Right arm         []   Right leg         []   Left arm         []   Left leg  []   Language/Speech         []   Aphasia         []   Dysarthria         []   Swallow    NIH Stroke Scale  Interval: Baseline  Level of Consciousness (1a. ): Alert  LOC Questions (1b. ): Answers one correctly  LOC Commands (1c. ): Performs both tasks correctly  Best Gaze (2. ): Normal  Visual (3. ): No visual loss  Facial Palsy (4. ): Normal symmetrical movement  Motor Arm, Left (5a. ): No drift  Motor Arm, Right (5b. ): No drift  Motor Leg, Left (6a. ): No drift  Motor Leg, Right (6b. ): No drift  Limb Ataxia (7. ): Absent  Sensory (8. ): Normal  Best Language (9. ): Mild to moderate aphasia  Dysarthria (10. ): Normal  Extinction and Inattention (11): No abnormality  Total: 2    Elsa Coma Scale  Eye Opening: Spontaneous  Best Verbal Response: Confused  Best Motor Response: Obeys commands  Carrillo Coma Scale Score: 14    Patient Deficit Notes: Additional CM/SW Notes:   Spoke with pt's son, Katherine Prather. He reports that pt is usually independent, she has had a fall before and a stroke in the past. She lives at home with her friend, Andres Dunne. SW will follow for further dc plans. Eliquis bishnu has been provided for pt in chart if needed at dc.      Jesusita Reyes and/or her family were provided with choice of provider:  [x]   Yes   []   No      Patient Admission Status:   Inpatient [101]    [address]

## 2021-12-21 NOTE — PROGRESS NOTES
Pharmacy Consult      Gricelda Burnett is a 80 y.o. female for whom pharmacy has been consulted to dose Lovenox. Patient Active Problem List   Diagnosis    Essential hypertension    CKD (chronic kidney disease)    Acquired hypothyroidism    CVD (cardiovascular disease)    Primary osteoarthritis involving multiple joints    Foot pain    Osteoporosis    Ataxia    Weakness    Non-intractable vomiting with nausea    Chest pain    Blindness of left eye    Dysarthria    Nonintractable headache    Acute kidney injury ([address]    Cerebrovascular accident (CVA) ([address]    Gait abnormality    Visual disturbance    Recurrent falls    Chronic GERD    Nausea and vomiting    Pseudobulbar affect    Left leg pain    Decreased dorsalis pedis pulse    Closed intertrochanteric fracture of hip, left, initial encounter ([address]    Pain in both lower extremities    Restless legs syndrome    Idiopathic peripheral neuropathy    Small vessel disease, cerebrovascular    Low back pain    History of stroke    TIA (transient ischemic attack)    COVID-19    Transient ischemic attack       Allergies:  Actonel  [risedronate sodium], Clarithromycin, Clonidine, Fosamax  [alendronate sodium], Gabapentin, Lisinopril, Meloxicam, Nsaids, and Phenergan [promethazine]     Recent Labs     12/21/21  0613   CREATININE 2.0*       Ht/Wt:   Ht Readings from Last 1 Encounters:   12/21/21 5' 6\" (1.676 m)        Wt Readings from Last 1 Encounters:   12/21/21 146 lb (66.2 kg)         Estimated Creatinine Clearance: 21 mL/min (A) (based on SCr of 2 mg/dL (H)). Assessment/Plan:  Per previous Covid-19 Lovenox dosing guidelines, in a patient with BMI< 30 (hers is 23.57) and CrCL 20-30 ml/min, the dose of Lovenox 30mg sq daily is appropriate. Her home medication list does not indicate she takes an anticoagulant at home so she does not appear to take anything for future stroke prevention. Did note she takes Aspirin 81 mg daily at home which may or may not be related.

## 2021-12-21 NOTE — PROGRESS NOTES
Palliative Care/Spiritual Care: Met with pt from the doorway to speak with her. Pt was really wanting her nurse and this  asked her nurse Swetha Sanders to visit with her. Spoke with pt's son, Grace Napier to initiate palliative care. Pt tested Covid positive after her significant other tested positive. Pt has a history of multiple diagnoses listed in Drs notes. Advance Directives: Pt does not have an AD/LW. Pt's son Grace Napier is her decision maker and he wants her to be a full code at this time. Pt's son says he wants CPR and Ventilator. SEE ACP NOTE. Pain/other symptoms: Pt was uncomfortable and wanted the SCD's off of her legs. This  notified her nurse. Social/Spiritual: Pt's son says pt was raised Religious.         Pt/family discussion r/t goals: Pt lives with a significant other Wilian Renteria and has a son. Pt has grown more and fatigued recently and fell this morning. Pt has a history of a stroke in 2016 and lost her vision in one eye. Pt lives with a SO and so she has assistance at home. Goals were not discussed during this conversation, pt had arrived at her room about an hour before th is visit, but Palliative care will follow up and discuss goals. Provided spiritual care with sustaining presence, mediation, and prayer with pt's son. Pt's son expressed gratitude for spiritual care.     Electronically signed by Radha Young on 12/21/2021 at 3:04 PM

## 2021-12-21 NOTE — PROGRESS NOTES
Call placed to Dr. Hemphill Bloodgood office regarding patient meds list. Awaiting call back.  Electronically signed by Liliane Melvin RN on 12/21/2021 at 1:50 PM

## 2021-12-21 NOTE — CONSULTS
[id number] [facility] Neurology Consult      Patient:   Priyanka Barlow  MR#:    [id number]  Account Number:                   [id number]      Room:    06/06   YOB: 1940  Date of Progress Note: 12/21/2021  Time of Note                           9:32 AM  Attending Physician:  Ana Laura Gregorio MD  Consulting Physician:  Bravo Garcia DO       CHIEF COMPLAINT:  Weakness, covid, speech difficulty     HISTORY OF PRESENT ILLNESS:   This is a 80 y.o. female who was admitted with with generalized weakness, recent Covid diagnosis, and speech difficulty. She has a prior stroke history with visual loss in the left. She also has a remote history of a craniotomy in the past also possibly due to to a prior stroke. She presented to the emergency department this morning after having a fall at home with generalized weakness. She was diagnosed with Covid a few days ago. Family noted some possible slurred speech which seemed to improve after arriving to the ED. CT head with nothing acute. CTA with severe carotid artery disease but no LVO. Her gait difficulty is a chronic issue and felt to be related to her prior strokes. She has been followed by Dr. Clyde Dickens and Dr. Colby Townsend in the past.   Location:    REVIEW OF SYSTEMS:  Constitutional - No fever     HENT -  No Scalp tenderness. No tinnitus or significant hearing loss. No nose bleeding, no sore throat. Eyes - No sudden vision change or eye pain  Respiratory - Shortness of breath / cough  Cardiovascular - No chest pain. No palpitations or significant leg swelling  Gastrointestinal - No abdominal swelling or pain. Genitourinary - No difficulty urinating, dysuria  Musculoskeletal - No back pain or myalgia. Skin - No color change or rash  Neurologic - No seizures. Hematologic - No easy bruising or spontaneous bleeding. Psychiatric - No anxiety.      PAST MEDICAL HISTORY:      Diagnosis Date    Blindness 2014    left eye due to sroke in 2013- [facility] GERD 4/26/2017    Chronic kidney disease     dr Lazcano Cluster pain     dr Glendy Perry     Headache     Hypertension     Hypothyroidism     Idiopathic peripheral neuropathy 8/22/2019    Low back pain 6/18/2020    Osteoarthritis     BILATERAL FEET    Osteoporosis     refuse treatment     Restless legs syndrome 8/22/2019    Stroke [facility]) 2013    eventually lost left eye sight due to this- dr Pearl Bailey        PAST SURGICAL HISTORY:      Procedure Laterality Date    BLADDER REPAIR  2011    bladder fell down. now feels like her Sphinctor muscle is very tight.  COLONOSCOPY  2005    CRANIAL LESION RESECTION  1981    blood clot    EYE SURGERY Left 02/02/2017    FEMUR FRACTURE SURGERY Left 4/25/2019    INTRA TROCAR SHORT TFN performed by Allie Laura MD at [address] [facility] Right 2011    [address] [place] [address]. [place] [facility]    OTHER SURGICAL HISTORY      TUBES TIED AT AGE 38    [place] EGD TRANSORAL BIOPSY SINGLE/MULTIPLE N/A 3/7/2017    Dr Tara Delgado-Gastritis   [facility] Mace GASTROINTESTINAL ENDOSCOPY  03/07/2017       SOCIAL HISTORY:   TOBACCO:   reports that she quit smoking about 61 years ago. She has a 12.00 pack-year smoking history. She has never used smokeless tobacco.  ETOH:   reports no history of alcohol use.   DRUG:    Social History     Substance and Sexual Activity   Drug Use No       FAMILY HISTORY:       Problem Relation Age of Onset    Diabetes Mother     Stroke Mother     Diabetes Sister     Stroke Sister     Diabetes Brother     Stroke Brother     Colon Cancer Neg Hx     Colon Polyps Neg Hx     Esophageal Cancer Neg Hx     Liver Cancer Neg Hx     Liver Disease Neg Hx     Stomach Cancer Neg Hx     Rectal Cancer Neg Hx        MEDICATIONS:      Current Facility-Administered Medications:     sodium chloride flush 0.9 % injection 5-40 mL, 5-40 mL, IntraVENous, 2 times per day, [facility], APRN - CNP    sodium chloride flush 0.9 % injection 5-40 mL, 5-40 mL, IntraVENous, PRN, Janet Houser, APRN - CNP    0.9 % sodium chloride infusion, 25 mL, IntraVENous, PRN, Janet Houser, APRN - CNP    potassium chloride 10 mEq/100 mL IVPB (Peripheral Line), 10 mEq, IntraVENous, PRN, Janet Houser, APRN - CNP    magnesium sulfate 2000 mg in 50 mL IVPB premix, 2,000 mg, IntraVENous, PRN, Janet Houser, APRN - CNP    polyethylene glycol (GLYCOLAX) packet 17 g, 17 g, Oral, Daily PRN, Janet Houser, APRN - CNP    acetaminophen (TYLENOL) tablet 650 mg, 650 mg, Oral, Q6H PRN **OR** acetaminophen (TYLENOL) suppository 650 mg, 650 mg, Rectal, Q6H PRN, Janet Houser, APRYL - CNP    ondansetron (ZOFRAN-ODT) disintegrating tablet 4 mg, 4 mg, Oral, Q8H PRN **OR** ondansetron (ZOFRAN) injection 4 mg, 4 mg, IntraVENous, Q6H PRN, Janet Houser APRN - CNP    enoxaparin (LOVENOX) injection 30 mg, 30 mg, SubCUTAneous, Daily, APRYL Suárez - CNP    famotidine (PEPCID) injection 10 mg, 10 mg, IntraVENous, BID, APRYL Suárez - CNP    Current Outpatient Medications:     losartan (COZAAR) 100 MG tablet, TAKE 1 TABLET DAILY, Disp: 90 tablet, Rfl: 3    levothyroxine (SYNTHROID) 50 MCG tablet, Take 1 tablet by mouth Daily, Disp: 90 tablet, Rfl: 3    atorvastatin (LIPITOR) 20 MG tablet, TAKE 1 TABLET NIGHTLY, Disp: 90 tablet, Rfl: 3    febuxostat (ULORIC) 40 MG TABS tablet, Take 1 tablet by mouth daily, Disp: 90 tablet, Rfl: 3    omeprazole (PRILOSEC) 40 MG delayed release capsule, TAKE 1 CAPSULE DAILY.       SWALLOW WHOLE. DO NOT CHEW OR CRUSH, Disp: 90 capsule, Rfl: 3    metoprolol (LOPRESSOR) 100 MG tablet, TAKE 1 TABLET TWICE A DAY, Disp: 180 tablet, Rfl: 3    metoprolol (LOPRESSOR) 100 MG tablet, TAKE 1 TABLET TWICE A DAY, Disp: 60 tablet, Rfl: 3    amLODIPine (NORVASC) 5 MG tablet, TAKE 1 TABLET DAILY, Disp: 90 tablet, Rfl: 3    citalopram (CELEXA) 20 MG tablet, Take 1 tablet by mouth daily, Disp: as below, see CN exam in the neurologic exam  ENT-    No scars, masses, or lesions over external nose or ears  Oropharynx without erythema, palate midline  Cardiovascular -   No clubbing, cyanosis, or edema   Pulmonary-   Good expansion, normal effort without use of accessory muscles  Musculoskeletal -   No significant wasting of muscles noted  Gait as below, see gait exam in the neurologic exam  Muscle strength, tone, stability as below see the motor exam in the neurologic exam.   No bony deformities  Skin -   Warm, dry, and intact to inspection and palpation. No rash, erythema, or pallor  Psychiatric -   Mood, affect, and behavior appear normal    Memory as below see mental status examination in the neurologic exam      NEUROLOGICAL EXAM    Mental status   [x] Awake, alert, oriented   [x] Affect attention and concentration appear appropriate  [x] Recent and remote memory appears unremarkable  [] Speech normal without dysarthria or aphasia, comprehension and repetition intact.    COMMENTS: Speech slightly slurred   Cranial Nerves [] No VF deficit to confrontation,  optic discs normal, no papilledema on fundoscopic exam.  [x] PERRLA, EOMI, no nystagmus, conjugate eye movements, no ptosis  [x] Face symmetric  [x] Facial sensation intact  [x] Tongue midline no atrophy or fasciculations present  [x] Palate midline, hearing to finger rub normal  [x] Shoulder shrug and SCM testing normal  COMMENTS: Decreased VA left eye (chronic)   Motor   [] 5/5 strength x 4 extremities  [x] Normal bulk and tone  [x] No tremor present  [x] No rigidity or bradykinesia noted  COMMENTS: 4/5 globally   Sensory  [] Sensation intact to light touch, pin prick, vibration, and proprioception BLE  [] Sensation intact to light touch, pin prick, vibration, and proprioception BUE  COMMENTS: Decreased LT, PP, Vib BLE   Coordination [x] FTN normal bilaterally   [] HTS normal bilaterally  [] SHAHBAZ normal.   COMMENTS:   Reflexes  [x] Symmetric and non-pathological  [x] Toes downgoing bilaterally  [x] No clonus present  COMMENTS:   Gait                  [] Normal steady gait    [] Ataxic    [] Spastic     [] Magnetic     [] Shuffling  [x] Not assessed  COMMENTS:       LABS/IMAGING:    As below and per HPI    CT HEAD WO CONTRAST    Result Date: 12/21/2021  Examination. CT HEAD WO CONTRAST 12/21/2021 6:32 AM History: Altered mental status after fall. Strokelike symptoms. DLP: 744 mGycm. The CT scan of the head is performed without intravenous contrast enhancement. The images are acquired in axial plane and subsequent reconstruction in coronal and sagittal planes. The comparison is made with the previous study dated 8/4/2016. There is no evidence of a mass. There is no midline shift. There is no evidence of intracranial hemorrhage or hematoma. The scattered areas of chronic ischemia are seen in the centrum semiovale bilaterally which are similar to the previous study. The gray-white matter differentiation is maintained. Moderately dilated ventricles, basal cistern and the cortical sulci are similar to the previous study suggesting moderate chronic White loss The images reviewed in bone window show right occipital craniotomy which is similar to the previous study. No acute bony abnormality. There is mucosal thickening involving the ethmoid sinuses. Severe atheromatous changes of the intracranial internal carotid arteries are seen. 1. No acute intracranial abnormality. 2. Chronic ischemic and atrophic changes. 3. Right occipital craniotomy similar to the previous study in August 2016. The above result was called to ER physician, Dr. Latosha Mukherjee. Signed by Dr Melissa Gonzalez    Result Date: 12/21/2021  EXAMINATION: XR CHEST PORTABLE 12/21/2021 8:48 AM HISTORY: Stroke symptoms. Report: A portable upright frontal view of the chest was obtained. COMPARISON: Chest x-ray 6/8/2021. The lungs are mildly hypoaerated, no infiltrate is seen.  Heart size is normal. No pneumothorax or effusion is identified. The bony thorax and upper abdomen are unremarkable. Impression: Shallow inspiration, no acute findings. Signed by Dr Mallory Soni. Ni    CTA HEAD NECK W CONTRAST    Result Date: 12/21/2021  Examination. CTA HEAD NECK W CONTRAST 12/21/2021 6:37 AM History: Altered mental status after fall. Speech problems. Strokelike symptoms. DLP: 298 mGycm. The CT angiography of the head and neck is performed after intravenous contrast enhancement. The images are acquired in axial plane and subsequent reconstruction in coronal and sagittal planes. There is no previous similar study for comparison. The correlation made with CT scan of the head performed earlier today. Atheromatous changes of the limited visualized aortic arch is noted. Atheromatous changes of the right brachiocephalic, left common carotid and left subclavian arteries are seen. The right brachiocephalic trunk divides into normal-appearing subclavian and common carotid artery. Right common carotid artery: Normal course and caliber frontal the level of bifurcation. There is a large calcific plaque which extends into the carotid bulb. No significant stenosis of the common carotid artery however there is a high-grade, more than 70% stenosis of the proximal carotid bulb. There is subsequent normal course and caliber of the right internal carotid artery. Right external carotid artery: Unremarkable. Left common carotid artery: Moderately tortuous course without a significant stenosis. There is a large plaque in the distal left common carotid artery extending into the lateral aspect of the base of the left internal carotid artery/carotid bulb. No significant stenosis of the common carotid artery are the carotid bulb. There are a few scattered areas of calcific plaques in the proximal left internal carotid artery without significant stenosis.  The remaining left internal carotid arteries normal. Left external carotid artery. There is a large calcific plaque at the origin of the left external carotid artery with approximately 70% focal stenosis. The vertebral arteries: There is normal origin course and caliber of both vertebral arteries, left side being dominant. No areas of focal stenosis or aneurysmal dilatation. A dominant left and relatively small right vertebral artery enter the foramen magnum. CTA HEAD: Normal size internal carotid arteries are seen at the base of skull bilaterally. There are severe atheromatous changes of both internal carotid arteries. Left internal carotid artery: There is approximately 70% stenosis of the distal left internal carotid artery in the distal part of the cavernous sinus proximal to the terminus. A normal-sized left suprasellar internal carotid artery seen dividing into normal-appearing anterior and middle cerebral artery. No areas of focal stenosis or aneurysmal dilatation. Subsequent normal branches. Right internal carotid artery. There is an area of high-grade, more than 70%, stenosis of the right internal carotid artery in the distal part of the cavernous sinus prior to the terminus. Normal size suprasellar internal carotid artery seen which divides into normal-appearing anterior and middle cerebral arteries. The vertebral arteries: A dominant left and a relatively small right vertebral artery enter the foramen magnum and join to make a relatively small basilar artery which divide into very small and attenuated P1 segment of posterior cerebral artery. There are larger than average posterior communicating arteries bilaterally which join the attenuated P1 segment to make normal size P2 and P3 segments bilaterally. Incidentally noted are multiple low density nodules in both lobes of the thyroid gland, right more than the left. There is mucosal thickening involving the ethmoid maxillary and sphenoid sinuses.     1. Severe atheromatous changes of the common and internal carotid arteries with areas of significant stenosis as detailed above. There is 75% stenosis of the proximal right internal carotid artery. There is high-grade stenosis of the distal intracranial internal carotid arteries bilaterally as detailed above. No evidence of total occlusion. 2. Fetal type posterior circulation. 3. The NASCET criteria was utilized for evaluation/calculation of carotid arterial stenosis. Signed by Dr Aviva Houser     12/21/21 0613   WBC 4.3*   HGB 11.0*   *     Recent Labs     12/21/21 0613   *   K 4.4      CO2 18*   BUN 35*   CREATININE 2.0*   GLUCOSE 103     Recent Labs     12/21/21 0613   AST 22   ALT 13   BILITOT 0.7   ALKPHOS 52         ASSESSMENT:  80 y.o. admitted with speech difficulty, generalized weakness, falls, recently diagnosed with COVID. Prior stroke history noted and prior craniotomy. CT head nothing acute. CTA with severe carotid artery disease but no LVO. PLAN:  1. MRI Brain  2. ECHO, Tele  3. Vascular surgery has been consulted given carotid stenosis. 4.  Antiplatelet therapy, statin, permissive hypertension  5. Continue addressing COVID, CHERRIE, mild pancytopenia. 6.  PT, OT, ST  7. DVT proph     Please feel free to call with any questions. [phone] (cell phone).     Davis Nixon DO  Board Certified Neurology

## 2021-12-21 NOTE — ED PROVIDER NOTES
[facility] EMERGENCY DEPT  eMERGENCY dEPARTMENT eNCOUnter      Pt Name: Margy Basurto  MRN: [id number]  [place] 1940  Date of evaluation: 12/21/2021  Provider: Wyatt Jimenez MD    [address] [place] [address]       Chief Complaint   Patient presents with    Speech Problem     pt presents to ED with c/o fall pt hit head on floor AMS after hitting head at 0500    Fall    Altered Mental Status         HISTORY OF PRESENT ILLNESS   (Location/Symptom, Timing/Onset,Context/Setting, Quality, Duration, Modifying Factors, Severity)  Note limiting factors. Margy Basurto is a 80 y.o. female who presents to the emergency department with slurred speech. Initial history is given per EMS due to patient's acuity of condition. EMS states that patient fell this morning and struck her head on the floor. Patient displayed confusion and slurred speech. EMS states that patient is back to her baseline mental status on arrival.  Speech still noted to be mildly slurred. Patient has recently been diagnosed with COVID-19 and has been noted, at home, to be \"lethargic. \"    HPI    NursingNotes were reviewed. REVIEW OF SYSTEMS    (2-9 systems for level 4, 10 or more for level 5)     Review of Systems   Unable to perform ROS: Mental status change   Constitutional: Positive for fatigue. Neurological: Positive for speech difficulty.             PAST MEDICALHISTORY     Past Medical History:   Diagnosis Date    Blindness 2014    left eye due to sroke in 2013- peter     Chronic GERD 4/26/2017    Chronic kidney disease     dr Krista Kate pain     dr Whitt Rater     Headache     Hypertension     Hypothyroidism     Idiopathic peripheral neuropathy 8/22/2019    Low back pain 6/18/2020    Osteoarthritis     BILATERAL FEET    Osteoporosis     refuse treatment     Restless legs syndrome 8/22/2019    Stroke [facility] 2013    eventually lost left eye sight due to this- dr Jessica Dao       Past Surgical History:   Procedure Laterality Date    BLADDER REPAIR  2011    bladder fell down. now feels like her Sphinctor muscle is very tight.  COLONOSCOPY  2005    CRANIAL LESION RESECTION  1981    blood clot    EYE SURGERY Left 02/02/2017    FEMUR FRACTURE SURGERY Left 4/25/2019    INTRA TROCAR SHORT TFN performed by Maggi Brink MD at [id number] [facility] Right 2011    [address] [place] [address].  IN [facility]    OTHER SURGICAL HISTORY      TUBES TIED AT AGE 38    TX EGD TRANSORAL BIOPSY SINGLE/MULTIPLE N/A 3/7/2017    Dr Cleo Delgado[facility]Gast[facility]   [facility]  03/07/2017         CURRENT MEDICATIONS     Previous Medications    ALBUTEROL (PROVENTIL) (2.5 MG/3ML) 0.083% NEBULIZER SOLUTION    Take 3 mLs by nebulization every 6 hours as needed for Wheezing    AMLODIPINE (NORVASC) 5 MG TABLET    TAKE 1 TABLET DAILY    ASPIRIN 81 MG CHEWABLE TABLET    Take 1 tablet by mouth daily    ATORVASTATIN (LIPITOR) 20 MG TABLET    TAKE 1 TABLET NIGHTLY    B COMPLEX-C (SUPER B COMPLEX PO)    Take by mouth    BIOTIN 5000 MCG TABS    Take by mouth daily as needed     CALCIUM PO    Take by mouth    CHLORTHALIDONE (HYGROTON) 25 MG TABLET    TAKE ONE TABLET BY MOUTH DAILY    CHOLECALCIFEROL (VITAMIN D PO)    Take 1,000 mg by mouth daily     CITALOPRAM (CELEXA) 20 MG TABLET    Take 1 tablet by mouth daily    CYCLOBENZAPRINE (FLEXERIL) 5 MG TABLET    Take 1 tablet by mouth 2 times daily as needed for Muscle spasms    DOXAZOSIN (CARDURA) 1 MG TABLET    1 mg    FEBUXOSTAT (ULORIC) 40 MG TABS TABLET    Take 1 tablet by mouth daily    LEVOTHYROXINE (SYNTHROID) 50 MCG TABLET    Take 1 tablet by mouth Daily    LOSARTAN (COZAAR) 100 MG TABLET    TAKE 1 TABLET DAILY    METOPROLOL (LOPRESSOR) 100 MG TABLET    TAKE 1 TABLET TWICE A DAY    METOPROLOL (LOPRESSOR) 100 MG TABLET    TAKE 1 TABLET TWICE A DAY    MISC NATURAL PRODUCTS (GLUCOSAMINE CHOND Food Insecurity:     Worried About Running Out of Food in the Last Year: Not on file    Dixon of Food in the Last Year: Not on file   Transportation Needs:     Lack of Transportation (Medical): Not on file    Lack of Transportation (Non-Medical): Not on file   Physical Activity:     Days of Exercise per Week: Not on file    Minutes of Exercise per Session: Not on file   Stress:     Feeling of Stress : Not on file   Social Connections:     Frequency of Communication with Friends and Family: Not on file    Frequency of Social Gatherings with Friends and Family: Not on file    Attends [employer]ism Services: Not on file    Active Member of [address] [place] [address] [employer] or Organizations: Not on file    Attends Club or Organization Meetings: Not on file    Marital Status: Not on file   Intimate Partner Violence:     Fear of Current or Ex-Partner: Not on file    Emotionally Abused: Not on file    Physically Abused: Not on file    Sexually Abused: Not on file   Housing Stability:     Unable to Pay for Housing in the Last Year: Not on file    Number of [place] in the Last Year: Not on file    Unstable Housing in the Last Year: Not on file       SCREENINGS   NIH Stroke Scale  Interval: Baseline  Level of Consciousness (1a. ): Alert  LOC Questions (1b. ):  Answers both correctly  LOC Commands (1c. ): Performs both tasks correctly  Best Gaze (2. ): Normal  Visual (3. ): No visual loss  Facial Palsy (4. ): Normal symmetrical movement  Motor Arm, Left (5a. ): No drift  Motor Arm, Right (5b. ): No drift  Motor Leg, Left (6a. ): No drift  Motor Leg, Right (6b. ): No drift  Limb Ataxia (7. ): Absent  Sensory (8. ): Normal  Best Language (9. ): Mild to moderate aphasia  Dysarthria (10. ): Normal  Extinction and Inattention (11): No abnormality  Total: 1         PHYSICAL EXAM    (up to 7 for level 4, 8 or more for level 5)     ED Triage Vitals [12/21/21 0609]   BP Temp Temp Source Pulse Resp SpO2 Height Weight   (!) 145/111 98.6 °F (37 °C) Oral 78 16 98 % 5' 6\" (1.676 m) 146 lb (66.2 kg)       Physical Exam  Vitals and nursing note reviewed. Constitutional:       General: She is not in acute distress. Appearance: Normal appearance. Comments: Patient sits quietly in bed in no acute distress at this time. HENT:      Head: Normocephalic. Right Ear: External ear normal.      Left Ear: External ear normal.      Nose: Nose normal.      Mouth/Throat:      Mouth: Mucous membranes are moist.      Pharynx: Oropharynx is clear. No oropharyngeal exudate. Eyes:      General: No scleral icterus. Conjunctiva/sclera: Conjunctivae normal.      Pupils: Pupils are equal, round, and reactive to light. Cardiovascular:      Rate and Rhythm: Normal rate and regular rhythm. Pulses: Normal pulses. Heart sounds: Normal heart sounds. Pulmonary:      Effort: Pulmonary effort is normal.      Breath sounds: Normal breath sounds. Abdominal:      General: Bowel sounds are normal.      Palpations: Abdomen is soft. Tenderness: There is no abdominal tenderness. There is no guarding. Musculoskeletal:         General: No tenderness or deformity. Cervical back: Neck supple. No rigidity. Right lower leg: No edema. Left lower leg: No edema. Skin:     General: Skin is warm and dry. Capillary Refill: Capillary refill takes less than 2 seconds. Coloration: Skin is not jaundiced. Neurological:      General: No focal deficit present. Mental Status: She is alert and oriented to person, place, and time. Mental status is at baseline. GCS: GCS eye subscore is 4. GCS verbal subscore is 5. GCS motor subscore is 6. Cranial Nerves: Dysarthria (Mild slurring of speech noted while saying words such as \"tip top. \") present. No facial asymmetry. Sensory: Sensation is intact. Motor: Motor function is intact. No weakness, tremor, abnormal muscle tone or pronator drift.       Coordination: Coordination normal.   Psychiatric:         Attention and Perception: Attention normal.         Mood and Affect: Mood normal.         Speech: Speech is slurred (Mildly slurred. ). Behavior: Behavior normal.         DIAGNOSTIC RESULTS     EKG: All EKG's areinterpreted by the Emergency Department Physician who either signs or Co-signs this chart in the absence of a cardiologist.    EKG dated 12/21/2021 at 0 6:32 AM: Normal sinus rhythm, rate 81. Borderline interventricular conduction delay with QRS of 113. T wave inversion/flattening in V1 and V2. QTc 508. RADIOLOGY:  Non-plain film images such as CT, Ultrasound and MRI are read by the radiologist. Plain radiographic images are visualized and preliminarily interpreted bythe emergency physician with the below findings:          XR CHEST PORTABLE   Final Result   Impression: Shallow inspiration, no acute findings. Signed by Dr Kevan Jimenez. [place]      CTA HEAD NECK W CONTRAST   Final Result   1. Severe atheromatous changes of the common and internal carotid   arteries with areas of significant stenosis as detailed above. There   is 75% stenosis of the proximal right internal carotid artery. There   is high-grade stenosis of the distal intracranial internal carotid   arteries bilaterally as detailed above. No evidence of total   occlusion. 2. Fetal type posterior circulation. 3. The NASCET criteria was utilized for evaluation/calculation of   carotid arterial stenosis. Signed by Dr Cassie Katz   Final Result   1. No acute intracranial abnormality. 2. Chronic ischemic and atrophic changes. 3. Right occipital craniotomy similar to the previous study in August 2016. The above result was called to ER physician, Dr. Magdaleno Ashford.    Signed by Dr Brandy Quintana:  Labs Reviewed   CBC WITH AUTO DIFFERENTIAL - Abnormal; Notable for the following components:       Result Value    WBC 4.3 (*)     RBC 3.75 (*)     Hemoglobin 11.0 (*)     Hematocrit 35.3 (*)     MCHC 31.2 (*)     Platelets 938 (*)     Neutrophils % 76.9 (*)     Lymphocytes % 14.6 (*)     Lymphocytes Absolute 0.6 (*)     All other components within normal limits   COMPREHENSIVE METABOLIC PANEL W/ REFLEX TO MG FOR LOW K - Abnormal; Notable for the following components:    Sodium 135 (*)     CO2 18 (*)     BUN 35 (*)     CREATININE 2.0 (*)     GFR Non- 24 (*)     GFR  29 (*)     Calcium 8.0 (*)     Total Protein 6.4 (*)     All other components within normal limits   COVID-19, RAPID   TROPONIN   SPECIMEN REJECTION   PROTIME-INR   CALCIUM, IONIZED   TSH WITH REFLEX TO FT4   POCT GLUCOSE   POCT GLUCOSE       All other labs were within normal range or not returned as of this dictation. EMERGENCY DEPARTMENT COURSE and DIFFERENTIAL DIAGNOSIS/MDM:   Vitals:    Vitals:    12/21/21 0609   BP: (!) 145/111   Pulse: 78   Resp: 16   Temp: 98.6 °F (37 °C)   TempSrc: Oral   SpO2: 98%   Weight: 146 lb (66.2 kg)   Height: 5' 6\" (1.676 m)       MDM  42-year-old female, recently diagnosed with COVID-19, presents after fall with mildly slurred speech. Lab, EKG, and radiology results reviewed. Discussed with Kevin Smith and Dr Kofi Barrera who accept patient for further evaluation and treatment. CONSULTS:  IP CONSULT TO PHARMACY  PHARMACY TO CHANGE BASE FLUIDS  IP CONSULT TO NEUROLOGY    PROCEDURES:  Unless otherwise noted below, none     Procedures    FINAL IMPRESSION      1. TIA (transient ischemic attack)    2. Fall, initial encounter    3.  COVID-19 virus infection          DISPOSITION/PLAN   DISPOSITION Decision To Admit 12/21/2021 08:33:35 AM           (Please note that portions of this note were completed with a voice recognition program.  Efforts were made to edit thedictations but occasionally words are mis-transcribed.)    Surjit Garvey MD (electronically signed)  Attending Emergency Physician          Surjit Garvey MD  12/21/21 0698

## 2021-12-21 NOTE — PROGRESS NOTES
Occupational Therapy   Occupational Therapy Initial Assessment  Date: 2021   Patient Name: Alma Moreno  MRN: [id number]     : 1940    Date of Service: 2021    Discharge Recommendations:          Assessment   Performance deficits / Impairments: Decreased functional mobility ; Decreased endurance;Decreased ADL status; Decreased ROM; Decreased balance;Decreased cognition;Decreased safe awareness  Assessment: Will progress as tolerated  Treatment Diagnosis: Pneumonia, Covid  Prognosis: Good  Decision Making: Low Complexity  REQUIRES OT FOLLOW UP: Yes  Activity Tolerance  Activity Tolerance: Patient limited by fatigue           Patient Diagnosis(es): The primary encounter diagnosis was TIA (transient ischemic attack). Diagnoses of Fall, initial encounter and COVID-19 virus infection were also pertinent to this visit. has a past medical history of Blindness, Chronic GERD, Chronic kidney disease, Foot pain, Headache, Hypertension, Hypothyroidism, Idiopathic peripheral neuropathy, Low back pain, Osteoarthritis, Osteoporosis, Restless legs syndrome, and Stroke ([facility] [address] has a past surgical history that includes cranial lesion resection (); hip surgery (Right, ); Inner ear surgery (Right, ); other surgical history; bladder repair (); Colonoscopy (); Tubal ligation; Eye surgery (Left, 2017); pr egd transoral biopsy single/multiple (N/A, 3/7/2017); Upper gastrointestinal endoscopy (2017); and Femur fracture surgery (Left, 2019).     Treatment Diagnosis: Pneumonia, Covid      Restrictions       Subjective   General  Chart Reviewed: Yes  Patient assessed for rehabilitation services?: Yes  Additional Pertinent Hx: Previous CVAs with resulting L eye blindness  Family / Caregiver Present: No  Diagnosis: Pneumonia, Covid  General Comment  Comments: Pt. appears confused per comments  Patient Currently in Pain: No  Vital Signs  Level of Consciousness: New Confusion or Agitation (1)  Patient Currently in Pain: No  Social/Functional History  Social/Functional History  Additional Comments: Pt. appears confused so unable to accurately assess prior level of function. Pt. unsure whether or not she used walker before this hospitalization       Objective   Vision: Impaired  Vision Exceptions: Legally blind (Blind in L eye)  Hearing: Within functional limits    Orientation  Overall Orientation Status: Impaired  Orientation Level: Oriented to person;Disoriented to place; Disoriented to time;Disoriented to situation        ADL  Feeding: Setup  Grooming: Supervision  UE Bathing: Minimal assistance  LE Bathing: Minimal assistance  UE Dressing: Minimal assistance  LE Dressing: Moderate assistance  Toileting: Moderate assistance           Transfers  Stand Step Transfers: Contact guard assistance  Sit to stand: Minimal assistance  Transfer Comments: Hand held assist to step over to [facility]     Cognition  Overall Cognitive Status: Exceptions  Following Commands: Follows one step commands with repetition; Follows one step commands with increased time  Attention Span: Difficulty dividing attention  Memory: Decreased short term memory;Decreased long term memory  Safety Judgement: Decreased awareness of need for assistance;Decreased awareness of need for safety  Problem Solving: Assistance required to generate solutions;Assistance required to correct errors made  Insights: Decreased awareness of deficits  Initiation: Requires cues for some  Sequencing: Requires cues for some                 LUE AROM (degrees)  LUE AROM : WFL  RUE AROM (degrees)  RUE AROM : WFL  LUE Strength  Gross LUE Strength: Exceptions to [facility]  L Shoulder Flex: 4-/5  L Shoulder ABduction: 4-/5  L Elbow Flex: 4-/5  L Elbow Ext: 4-/5  RUE Strength  Gross RUE Strength: Exceptions to [facility]  R Shoulder Flex: 4-/5  R Shoulder ABduction: 4-/5  R Elbow Flex: 4-/5  R Elbow Ext: 4-/5                   Plan   Plan  Times per week: 3-5x/week  Times per day: Daily    G-Code     OutComes Score                                                  AM-PAC Score             Goals  Short term goals  Time Frame for Short term goals: 1 week  Short term goal 1: Supervision with BSC tfers with RW  Short term goal 2: Supervision with managing clothes and toilet hygiene  Short term goal 3: Supervision with seated LB dsg  Long term goals  Long term goal 1: Return to PLOF       Therapy Time   Individual Concurrent Group Co-treatment   Time In           Time Out           Minutes                   Dheeraj Hubbard, OT

## 2021-12-21 NOTE — PROGRESS NOTES
Pharmacy Consult      Yeny Armstrong is a 80 y.o. female for whom pharmacy has been consulted to review this patient's medication profile to evaluate IV medications and change all base solutions to 0.9% sodium chloride if possible based on compatibility and product availability. This profile review will include an assessment of total IV fluids being administered to the patient. If a current order exists for continuous infusion of non-hypertonic plain IV fluid, the pharmacist will contact the prescriber to recommend the discontinuation of the IV fluid order. Patient Active Problem List   Diagnosis    Essential hypertension    CKD (chronic kidney disease)    Acquired hypothyroidism    CVD (cardiovascular disease)    Primary osteoarthritis involving multiple joints    Foot pain    Osteoporosis    Ataxia    Weakness    Non-intractable vomiting with nausea    Chest pain    Blindness of left eye    Dysarthria    Nonintractable headache    Acute kidney injury ([address]    Cerebrovascular accident (CVA) ([address]    Gait abnormality    Visual disturbance    Recurrent falls    Chronic GERD    Nausea and vomiting    Pseudobulbar affect    Left leg pain    Decreased dorsalis pedis pulse    Closed intertrochanteric fracture of hip, left, initial encounter ([facility] [address]    Pain in both lower extremities    Restless legs syndrome    Idiopathic peripheral neuropathy    Small vessel disease, cerebrovascular    Low back pain    History of stroke       Allergies:  Actonel  [risedronate sodium], Clarithromycin, Clonidine, Fosamax  [alendronate sodium], Gabapentin, Lisinopril, Meloxicam, Nsaids, and Phenergan [promethazine]     Recent Labs     12/21/21  0613   CREATININE 2.0*       Ht/Wt:   Ht Readings from Last 1 Encounters:   12/21/21 5' 6\" (1.676 m)        Wt Readings from Last 1 Encounters:   12/21/21 146 lb (66.2 kg)         Estimated Creatinine Clearance: 21 mL/min (A) (based on SCr of 2 mg/dL (H)).     Assessment/Plan:    Currently, no IVPB medications or IV fluids have been ordered. Thank you for the consult. Will continue to follow.     Electronically signed by Ana Amaya [facility] on 12/21/2021 at 7:07 AM

## 2021-12-22 NOTE — CONSULTS
[facility] Vascular Surgery    CONSULT    Patient:  Corwin Chen  YOB: 1940  Date of Service: 12/22/2021  MRN: [id number]   Acct: [de-identified]   Primary Care Physician: Kelly Guadarrama MD    Reason for consult: Carotid Stenosis    Requesting Physician: APRYL Sandy    History Obtained From: Patient, Chart    HISTORY OF PRESENT ILLNESS:  Ms. Corwin Chen is a 80 y.o. female who presented to the ER after falling at home and hitting her head. She has history of previous CVA with residual left eye blindness. Patient also has had a craniotomy in the past. She was diagnosed with COVID a few days prior to admission and is unvaccinated. Work-up in the ER included CT head that demonstrated chronic ischemic and atrophic changes with right occipital craniotomy. CTA head and neck demonstrated severe intracranial stenosis in [place] with 75% proximal stenosis. MRI Brain demonstrated acute right basal ganglia infarct. Patient was seen by Dr. Muna Rosado, Neurology, who recommended antiplatelet therapy and statin. Vascular surgery consulted for carotid stenosis. Past Medical History:       Diagnosis Date    Blindness 2014    left eye due to sroke in 2013- [facility]     Chronic GERD 4/26/2017    Chronic kidney disease     dr Tamia Luna pain     dr David Naidu     Headache     Hypertension     Hypothyroidism     Idiopathic peripheral neuropathy 8/22/2019    Low back pain 6/18/2020    Osteoarthritis     BILATERAL FEET    Osteoporosis     refuse treatment     Restless legs syndrome 8/22/2019    Stroke [facility]) 2013    eventually lost left eye sight due to this- dr Hattie Jalloh        Past Surgical History:        Procedure Laterality Date    BLADDER REPAIR  2011    bladder fell down. now feels like her Sphinctor muscle is very tight.     COLONOSCOPY  2005    CRANIAL LESION RESECTION  1981    blood clot    EYE SURGERY Left 02/02/2017    FEMUR FRACTURE SURGERY Left 4/25/2019    INTRA TROCAR SHORT TFN performed by Braulio Mariee MD at [id number] [facility] Right 2011    [address] [facility] [address]. EAR SURGERY Right 1968    EARDRUM [address] [facility]  IN [facility]    OTHER SURGICAL HISTORY      TUBES TIED AT AGE 38    MO EGD TRANSORAL BIOPSY SINGLE/MULTIPLE N/A 3/7/2017    Dr Mathias Brunner Jones-Gastritis   Christine Vicente UPPER GASTROINTESTINAL ENDOSCOPY  03/07/2017       Allergies:  Actonel  [risedronate sodium], Clarithromycin, Clonidine, Fosamax  [alendronate sodium], Gabapentin, Lisinopril, Meloxicam, Nsaids, and Phenergan [promethazine]    Medications Current:   Current Facility-Administered Medications   Medication Dose Route Frequency Provider Last Rate Last Admin    losartan (COZAAR) tablet 100 mg  100 mg Oral Daily Richard Aguilar MD        metoprolol tartrate (LOPRESSOR) tablet 100 mg  100 mg Oral BID Richard Aguilar MD   100 mg at 12/22/21 0157    [START ON 12/23/2021] amLODIPine (NORVASC) tablet 10 mg  10 mg Oral Daily Richard Aguilar MD        doxazosin (CARDURA) tablet 2 mg  2 mg Oral Daily Richard Aguilar MD        hydrALAZINE (APRESOLINE) injection 5 mg  5 mg IntraVENous Q4H PRN Richard Aguilar MD        sodium chloride flush 0.9 % injection 5-40 mL  5-40 mL IntraVENous 2 times per day Lourdes Maker, APRN - CNP   10 mL at 12/21/21 2102    sodium chloride flush 0.9 % injection 5-40 mL  5-40 mL IntraVENous PRN Lourdes Maker, APRN - CNP        0.9 % sodium chloride infusion  25 mL IntraVENous PRN Lourdes Maker, APRN - CNP        potassium chloride 10 mEq/100 mL IVPB (Peripheral Line)  10 mEq IntraVENous PRN Lourdes Maker, APRN - CNP        magnesium sulfate 2000 mg in 50 mL IVPB premix  2,000 mg IntraVENous PRN Lourdes Maker, APRN - CNP        polyethylene glycol (GLYCOLAX) packet 17 g  17 g Oral Daily PRN Lourdes Maker, APRN - CNP        acetaminophen (TYLENOL) tablet 650 mg  650 mg Oral Q6H PRN Lourdes Maker, APRN - CNP Or    acetaminophen (TYLENOL) suppository 650 mg  650 mg Rectal Q6H PRN Laina Barness, APRN - CNP        ondansetron (ZOFRAN-ODT) disintegrating tablet 4 mg  4 mg Oral Q8H PRN Laina Barness, APRN - CNP        Or    ondansetron [facility]) injection 4 mg  4 mg IntraVENous Q6H PRN Laina Barness, APRN - CNP        enoxaparin (LOVENOX) injection 30 mg  30 mg SubCUTAneous Daily Laina Guajardo, APRN - CNP   30 mg at 12/21/21 1457    famotidine (PEPCID) injection 10 mg  10 mg IntraVENous BID Laina Guajardo, APRN - CNP   10 mg at 12/21/21 2300    ipratropium (ATROVENT HFA) 17 MCG/ACT inhaler 2 puff  2 puff Inhalation 4x daily Laina Guajardo, APRN - CNP   2 puff at 12/22/21 0856    budesonide-formoterol (SYMBICORT) 160-4.5 MCG/ACT inhaler 2 puff  2 puff Inhalation BID Laina Guajardo, APRN - CNP   2 puff at 12/22/21 7305    Vitamin D (CHOLECALCIFEROL) tablet 2,000 Units  2,000 Units Oral Daily Laina Guajardo, APRN - CNP   2,000 Units at 12/21/21 1448    ascorbic acid (VITAMIN C) tablet 500 mg  500 mg Oral Daily Laina Guajardo, APRN - CNP   500 mg at 12/21/21 1504    zinc sulfate (ZINCATE) capsule 50 mg  50 mg Oral Daily Laina Guajardo, APRN - CNP   50 mg at 12/21/21 1448    dexamethasone (DECADRON) tablet 6 mg  6 mg Oral Daily Laina Guajardo, APRN - CNP   6 mg at 12/21/21 1448    perflutren lipid microspheres (DEFINITY) injection 1.65 mg  1.5 mL IntraVENous ONCE PRN Carmen Gonzales MD   1.5 mL at 12/21/21 1443       Social History:  Reports that she quit smoking about 61 years ago. She has a 12.00 pack-year smoking history. She has never used smokeless tobacco. She reports that she does not drink alcohol and does not use drugs.     Family History:      Problem Relation Age of Onset    Diabetes Mother     Stroke Mother     Diabetes Sister     Stroke Sister     Diabetes Brother     Stroke Brother     Colon Cancer Neg Hx     Colon Polyps Neg Hx     Esophageal Cancer Neg Hx     Liver Cancer Neg Hx  Liver Disease Neg Hx     Stomach Cancer Neg Hx     Rectal Cancer Neg Hx        REVIEW OF SYSTEMS:  General: Denies any fever or chills. Denies any unexplained weight loss or gain. Denies any change in activity or endurance. Lisa Coreas at home. HEENT: Denies any headaches. Residual left eye blindness 2' to previous stroke. Respiratory: Denies any cough or hoarseness. Denies shortness of breath. Cardiac: Denies any chest pain or pressure. Denies any palpitations. Denies any presyncope or syncope. Denies any orthopnea or PND. Denies any lower extremity edema. GI: Denies any abdominal pain. Denies any nausea or vomiting. Denies any change in bowel habits. Denies any recent history of GI tract blood loss. : Denies any hematuria, frequency, hesitancy, or dysuria. Musculoskeletal: Denies any pain or swelling in her joints. Neurological: Denies any paraesthesias. Speech difficulty. Falls. Hx of previous CVA with right occipital lobectomy - chronic vision loss of left eye. Psychological: Denies any problems with anxiety or depression. All other systems are negative except where stated above. PHYSICAL EXAM:  BP (!) 170/79   Pulse 61   Temp 97.5 °F (36.4 °C)   Resp 18   Ht 5' 6\" (1.676 m)   Wt 136 lb (61.7 kg)   SpO2 97%   BMI 21.95 kg/m²   General appearance: Demonstrates a well-developed, well-nourished female who is alert and oriented in no acute distress. HEENT: Normocephalic. Atraumatic. BRITANY. NECK: Supple. NO JVD. No carotids bruits auscultated. Chest: Clear to auscultation bilaterally without wheezes or rhonchi. Cardiac: Normal heart tones with regular rate and rhythm, S1, S2 normal. No murmurs, gallops, or rubs auscultated. Abdomen: Soft, non-tender; non-distended normal bowel sounds no masses, no organomegaly. Extremities: No clubbing or cyanosis. No peripheral edema. Peripheral pulses palpable.   Skin: Skin color, texture, turgor normal. No rashes or lesions  Neurologic: Grossly Residual Left Eye Blindness  5. Essential HTN  6. CKD, Stage 3b  7. Acquired Hypothyroidism  8. GERD  9. Idiopathic Peripheral Neuropathy        PLAN:    1. Carotid stenosis is intracranial. If felt to be cause of CVA, will need transfer to tertiary care center for treatment. With COVID-19, patient is hypercoagulopathic. Could benefit from Eliquis if ok with Neurology. Vascular surgery will sign off as no surgical need at this time.        APRYL Faith

## 2021-12-22 NOTE — PROGRESS NOTES
Occupational Therapy  Facility/Department: [facility] 4 ONCOLOGY UNIT  Daily Treatment Note  NAME: Tim Cope  : 1940  MRN: [id number]    Date of Service: 2021    Discharge Recommendations:          Assessment   Assessment: Pt. still leaning slightly backward and unsteady when walking            Patient Diagnosis(es): The primary encounter diagnosis was TIA (transient ischemic attack). Diagnoses of Fall, initial encounter and COVID-19 virus infection were also pertinent to this visit. has a past medical history of Blindness, Chronic GERD, Chronic kidney disease, Foot pain, Headache, Hypertension, Hypothyroidism, Idiopathic peripheral neuropathy, Low back pain, Osteoarthritis, Osteoporosis, Restless legs syndrome, Stenosis of intracranial portions of right internal carotid artery, and Stroke ([facility] [address] has a past surgical history that includes cranial lesion resection (); hip surgery (Right, ); Inner ear surgery (Right, ); other surgical history; bladder repair (); Colonoscopy (); Tubal ligation; Eye surgery (Left, 2017); pr egd transoral biopsy single/multiple (N/A, 3/7/2017); Upper gastrointestinal endoscopy (2017); and Femur fracture surgery (Left, 2019).     Restrictions     Subjective   General  Chart Reviewed: Yes  Patient assessed for rehabilitation services?: Yes  Additional Pertinent Hx: Previous CVAs with resulting L eye blindness  Family / Caregiver Present: No  Diagnosis: Pneumonia, Covid  General Comment  Comments: Pt. appears confused per comments      Orientation     Objective                   Transfers  Sit to stand: Minimal assistance  Transfer Comments: Hand held assist to walk to door and back to bed twice                                                                 Plan   Plan  Times per week: 3-5x/week  Times per day: Daily  G-Code     OutComes Score                                                  AM-PAC Score             Goals  Short term goals  Time Frame for Short term goals: 1 week  Short term goal 1: Supervision with BSC tfers with RW  Short term goal 2: Supervision with managing clothes and toilet hygiene  Short term goal 3: Supervision with seated LB dsg  Long term goals  Long term goal 1: Return to PLOF       Therapy Time   Individual Concurrent Group Co-treatment   Time In           Time Out           Minutes                   Ever Mora, OT

## 2021-12-22 NOTE — PROGRESS NOTES
Contrast Medium: Definity. Amount - 6 ml HR: 83 bpm BP: 145/111 mmHg Indications:CVA. Conclusions   Summary  Normal left ventricular size with preserved LV function and an estimated  ejection fraction of approximately 70-75%. Indeterminate diastolic function with evidence for increased LVEDP. Normal right ventricular size with grossly preserved RV function. No evidence of significant inter-atrial communications by color flow  Doppler or Bubble study (with and without valsalva). Mild calcification of the mitral valve leaflets with normal leaflet  mobility. Mild mitral valve stenosis. No mitral regurgitation. Aortic root and ascending aorta are not well visualized. No evidence of tricuspid regurgitation. Cannot estimate RVSP. No evidence of significant pericardial effusion is noted. The rhythm is sinus. Signature   ----------------------------------------------------------------  Electronically signed by Breezy Rodriguez(Interpreting physician)  on 12/21/2021 03:19 PM  ----------------------------------------------------------------   Findings   Mitral Valve  Mild calcification of the mitral valve leaflets with normal leaflet  mobility. Mild mitral valve stenosis. No mitral regurgitation. Aortic Valve  Aortic valve was not well visualized. No significant aortic regurgitation or stenosis is noted. Tricuspid Valve  Tricuspid valve is not well visualized. No evidence of tricuspid regurgitation. Cannot estimate RVSP. Pulmonic Valve  The pulmonic valve was not well visualized. Left Atrium  Normal size left atrium. No evidence of significant inter-atrial communications by color flow  Doppler or Bubble study (with and without valsalva). Left Ventricle  Normal left ventricular size with preserved LV function and an estimated  ejection fraction of approximately 70-75%. Despite the use of IV contrast,  not all segments of the endocardial borders were visualized.   No evidence of left ventricular mass or similar to the previous study suggesting moderate chronic White loss The images reviewed in bone window show right occipital craniotomy which is similar to the previous study. No acute bony abnormality. There is mucosal thickening involving the ethmoid sinuses. Severe atheromatous changes of the intracranial internal carotid arteries are seen. 1. No acute intracranial abnormality. 2. Chronic ischemic and atrophic changes. 3. Right occipital craniotomy similar to the previous study in August 2016. The above result was called to ER physician, Dr. Torin Peters. Signed by Dr Clay Colmenares    Result Date: 12/21/2021  EXAMINATION: XR CHEST PORTABLE 12/21/2021 8:48 AM HISTORY: Stroke symptoms. Report: A portable upright frontal view of the chest was obtained. COMPARISON: Chest x-ray 6/8/2021. The lungs are mildly hypoaerated, no infiltrate is seen. Heart size is normal. No pneumothorax or effusion is identified. The bony thorax and upper abdomen are unremarkable. Impression: Shallow inspiration, no acute findings. Signed by Dr Yolis Thompson. [place]    CTA HEAD NECK W CONTRAST    Result Date: 12/21/2021  Examination. CTA HEAD NECK W CONTRAST 12/21/2021 6:37 AM History: Altered mental status after fall. Speech problems. Strokelike symptoms. DLP: 298 mGycm. The CT angiography of the head and neck is performed after intravenous contrast enhancement. The images are acquired in axial plane and subsequent reconstruction in coronal and sagittal planes. There is no previous similar study for comparison. The correlation made with CT scan of the head performed earlier today. Atheromatous changes of the limited visualized aortic arch is noted. Atheromatous changes of the right brachiocephalic, left common carotid and left subclavian arteries are seen. The right brachiocephalic trunk divides into normal-appearing subclavian and common carotid artery.  Right common carotid artery: Normal course and caliber frontal the level of bifurcation. There is a large calcific plaque which extends into the carotid bulb. No significant stenosis of the common carotid artery however there is a high-grade, more than 70% stenosis of the proximal carotid bulb. There is subsequent normal course and caliber of the right internal carotid artery. Right external carotid artery: Unremarkable. Left common carotid artery: Moderately tortuous course without a significant stenosis. There is a large plaque in the distal left common carotid artery extending into the lateral aspect of the base of the left internal carotid artery/carotid bulb. No significant stenosis of the common carotid artery are the carotid bulb. There are a few scattered areas of calcific plaques in the proximal left internal carotid artery without significant stenosis. The remaining left internal carotid arteries normal. Left external carotid artery. There is a large calcific plaque at the origin of the left external carotid artery with approximately 70% focal stenosis. The vertebral arteries: There is normal origin course and caliber of both vertebral arteries, left side being dominant. No areas of focal stenosis or aneurysmal dilatation. A dominant left and relatively small right vertebral artery enter the foramen magnum. CTA HEAD: Normal size internal carotid arteries are seen at the base of skull bilaterally. There are severe atheromatous changes of both internal carotid arteries. Left internal carotid artery: There is approximately 70% stenosis of the distal left internal carotid artery in the distal part of the cavernous sinus proximal to the terminus. A normal-sized left suprasellar internal carotid artery seen dividing into normal-appearing anterior and middle cerebral artery. No areas of focal stenosis or aneurysmal dilatation. Subsequent normal branches. Right internal carotid artery.  There is an area of high-grade, more than 70%, stenosis of the right internal carotid artery in the distal part of the cavernous sinus prior to the terminus. Normal size suprasellar internal carotid artery seen which divides into normal-appearing anterior and middle cerebral arteries. The vertebral arteries: A dominant left and a relatively small right vertebral artery enter the foramen magnum and join to make a relatively small basilar artery which divide into very small and attenuated P1 segment of posterior cerebral artery. There are larger than average posterior communicating arteries bilaterally which join the attenuated P1 segment to make normal size P2 and P3 segments bilaterally. Incidentally noted are multiple low density nodules in both lobes of the thyroid gland, right more than the left. There is mucosal thickening involving the ethmoid maxillary and sphenoid sinuses. 1. Severe atheromatous changes of the common and internal carotid arteries with areas of significant stenosis as detailed above. There is 75% stenosis of the proximal right internal carotid artery. There is high-grade stenosis of the distal intracranial internal carotid arteries bilaterally as detailed above. No evidence of total occlusion. 2. Fetal type posterior circulation. 3. The NASCET criteria was utilized for evaluation/calculation of carotid arterial stenosis. Signed by Dr Jeni Giron    Result Date: 12/21/2021  EXAMINATION:  MRI BRAIN WO CONTRAST  12/21/2021 1:15 PM HISTORY: Stroke symptoms COMPARISON: Previous brain MR dated 8/5/2016 and CT imaging dated 12/21/2021 TECHNIQUE: Limited MR imaging the brain performed due to patient motion and inability to cooperate. FINDINGS: There is restricted diffusion signal abnormality identified compatible with acute ischemic change/infarction involving the right basal ganglia, particularly the external capsule/subinsular cortex extending superiorly in the anterior lateral putamen region adjacent to the caudate nucleus.  There is mild diffusion signal appreciated in the midbrain, positioned in the midline between the cerebral peduncles, suspect artifact. No additional diffusion signal abnormalities observed. Postsurgical changes noted in the right occipital region. There is diffuse central and cortical atrophy. . There is no mass effect or midline shift. 1. Acute right basal ganglia infarct. 2. Mild focal Diffusion signal appreciated in the mid brain, midline between the cerebral peduncles, suspect artifact. Signed by Dr Roseline Roblero      Lab Results   Component Value Date    WBC 1.9 (L) 12/22/2021    HGB 10.9 (L) 12/22/2021    HCT 34.8 (L) 12/22/2021    MCV 90.2 12/22/2021     (L) 12/22/2021     Lab Results   Component Value Date     12/22/2021    K 4.9 12/22/2021     12/22/2021    CO2 21 (L) 12/22/2021    BUN 47 (H) 12/22/2021    CREATININE 2.1 (H) 12/22/2021    GLUCOSE 148 (H) 12/22/2021    CALCIUM 8.1 (L) 12/22/2021    PROT 6.4 (L) 12/21/2021    LABALBU 3.5 12/21/2021    BILITOT 0.7 12/21/2021    ALKPHOS 52 12/21/2021    AST 22 12/21/2021    ALT 13 12/21/2021    LABGLOM 23 (A) 12/22/2021    GFRAA 27 (L) 12/22/2021    GLOB 2.8 02/08/2017     Lab Results   Component Value Date    INR 1.14 12/21/2021    INR 1.05 04/24/2019    PROTIME 14.8 (H) 12/21/2021    PROTIME 13.1 04/24/2019       RECORD REVIEW:   Previous medical records, medications were reviewed at today's visit. Nursing/physician notes, imaging, labs and vitals reviewed. PT,OT and/or speech notes reviewed      ASSESSMENT:  80 y.o. admitted with speech difficulty, generalized weakness, falls, recently diagnosed with COVID. Prior stroke history noted and prior craniotomy. MRI with right basal ganglia stroke. CTA with severe intracranial carotid artery disease but no LVO. ECHO negative. Exam stable.      PLAN:  1. Follow Tele  2. Vascular surgery evaluated carotid stenosis, intracranial, no overt LVO, medical management.    3.  Given COVID and new stroke would recommend anticoagulation, stroke is small, hemorragic transformation risk is felt low. Will start Eliquis. Add statin. Continue permissive hypertension. Hold off on antiplatelet therapy in addition to anticoagulation given mild thrombocytopenia and to minimize bleeding risk. 5.  Continue addressing COVID, CHERRIE, pancytopenia. 6.  PT, OT, ST  7. DVT proph     Please feel free to call with any questions. [phone] (cell phone).     Anu Kiser DO  Board Certified Neurology

## 2021-12-22 NOTE — PROGRESS NOTES
[facility]      [facility] Note    Patient:  Alcon Ortega  YOB: 1940  Date of Service: 12/22/2021  MRN: [id number]   Acct: [de-identified]   Primary Care Physician: Sp Lovell MD  Advance Directive: Full Code  Admit Date: 12/21/2021       Hospital Day: 1    Portions of this note have been copied forward, however, updated to reflect the most current clinical status of this patient. CHIEF COMPLAINT syncope and fall    SUBJECTIVE: Patient very confused today. Got out of bed nursing had to place a bed alarm. Speech is good today and she reorients quickly. No new complaints today      Elisa 10:   80 y.o. admitted with speech difficulty, generalized weakness, falls, recently diagnosed with [place] [address] stroke history noted and prior craniotomy.  MRI with right basal ganglia stroke. CTA with severe intracranial carotid artery disease but no LVO.  ECHO negative. Was started on Eliquis and a statin. PT and OT and speech involved. Vascular has seen for her carotids stenosis. Felt to be intracranial and she will need to be transferred for tertiary care for intervention        Review of Systems   Constitutional: Positive for fatigue. HENT: Negative. Eyes: Negative. Respiratory: Negative. Cardiovascular: Negative. Gastrointestinal: Negative. Endocrine: Negative. Musculoskeletal: Negative. Allergic/Immunologic: Negative. Neurological: Positive for weakness. Hematological: Negative. Psychiatric/Behavioral: Positive for confusion.         Objective:   VITALS:  BP (!) 153/78   Pulse 68   Temp 97.4 °F (36.3 °C) (Temporal)   Resp 16   Ht 5' 6\" (1.676 m)   Wt 136 lb (61.7 kg)   SpO2 94%   BMI 21.95 kg/m²   24HR INTAKE/OUTPUT:    Intake/Output Summary (Last 24 hours) at 12/22/2021 1704  Last data filed at 12/22/2021 1409  Gross per 24 hour   Intake 1600 ml   Output 900 ml   Net 700 ml       Physical Exam       Medications:      sodium chloride        losartan  100 mg Oral Daily    metoprolol  100 mg Oral BID    [START ON 12/23/2021] amLODIPine  10 mg Oral Daily    doxazosin  2 mg Oral Daily    apixaban  5 mg Oral BID    atorvastatin  20 mg Oral Nightly    sodium chloride flush  5-40 mL IntraVENous 2 times per day    famotidine (PEPCID) injection  10 mg IntraVENous BID    ipratropium  2 puff Inhalation 4x daily    budesonide-formoterol  2 puff Inhalation BID    Vitamin D  2,000 Units Oral Daily    ascorbic acid  500 mg Oral Daily    zinc sulfate  50 mg Oral Daily    dexamethasone  6 mg Oral Daily     hydrALAZINE, sodium chloride flush, sodium chloride, potassium chloride, magnesium sulfate, polyethylene glycol, acetaminophen **OR** acetaminophen, ondansetron **OR** ondansetron  ADULT DIET; Dysphagia - Minced and Moist; Low Potassium (Less than 3000 mg/day); Low Phosphorus (Less than 1000 mg); Mildly Thick ([place]); Safety Tray; Safety Tray (Disposables)     Lab and other Data:     Recent Labs     12/21/21  0613 12/22/21  0636   WBC 4.3* 1.9*   HGB 11.0* 10.9*   * 112*     Recent Labs     12/21/21  0613 12/22/21  0636   * 138   K 4.4 4.9    104   CO2 18* 21*   BUN 35* 47*   CREATININE 2.0* 2.1*   GLUCOSE 103 148*     Recent Labs     12/21/21  0613   AST 22   ALT 13   BILITOT 0.7   ALKPHOS 52     Troponin T:   Recent Labs     12/21/21  0613   TROPONINI 0.02     Pro-BNP: No results for input(s): BNP in the last 72 hours. INR:   Recent Labs     12/21/21  1317   INR 1.14     UA:No results for input(s): NITRITE, COLORU, PHUR, LABCAST, WBCUA, RBCUA, MUCUS, TRICHOMONAS, YEAST, BACTERIA, CLARITYU, SPECGRAV, LEUKOCYTESUR, UROBILINOGEN, BILIRUBINUR, BLOODU, GLUCOSEU, AMORPHOUS in the last 72 hours. Invalid input(s): Michele Beckett  A1C: No results for input(s): LABA1C in the last 72 hours. ABG:No results for input(s): PHART, FVI9XFE, PO2ART, DRT2RDD, BEART, HGBAE, G0KEVCJZ, CARBOXHGBART in the last 72 hours.     RAD:   ECHO Complete 2D W mitral regurgitation. Aortic Valve  Aortic valve was not well visualized. No significant aortic regurgitation or stenosis is noted. Tricuspid Valve  Tricuspid valve is not well visualized. No evidence of tricuspid regurgitation. Cannot estimate RVSP. Pulmonic Valve  The pulmonic valve was not well visualized. Left Atrium  Normal size left atrium. No evidence of significant inter-atrial communications by color flow  Doppler or Bubble study (with and without valsalva). Left Ventricle  Normal left ventricular size with preserved LV function and an estimated  ejection fraction of approximately 70-75%. Despite the use of IV contrast,  not all segments of the endocardial borders were visualized. No evidence of left ventricular mass or thrombus noted. Indeterminate diastolic function with evidence for increased LVEDP. Right Atrium  Normal right atrial dimension with no evidence of thrombus or mass noted. Right Ventricle  Normal right ventricular size with grossly preserved RV function. Pericardial Effusion  No evidence of significant pericardial effusion is noted. Pleural Effusion  No evidence of pleural effusion. Miscellaneous  Aortic root and ascending aorta are not well visualized. IVC normal.  The rhythm is sinus.   M-Mode Measurements (cm)   LVIDd: 3.36 cm                       LVIDs: 2.29 cm  IVSd: 1.09 cm  LVPWd: 1.16 cm                       AO Root Dimension: 1.9 cm  % Ejection Fraction: 58 %            LA: 2.8 cm                                       LVOT: 1.9 cm  Doppler Measurements:   AV Peak Velocity:123 cm/s           MV Peak E-Wave: 94.1 cm/s  AV Peak Gradient: 6.05 mmHg         MV Peak A-Wave: 136 cm/s                                      MV E/A Ratio: 0.69 %                                      MV Peak Gradient: 3.54 mmHg                                      MV P1/2t: 84 msec                                      MVA by PHT2.62 cm^2      CT HEAD WO CONTRAST    Result Date: 12/21/2021  Examination. CT HEAD WO CONTRAST 12/21/2021 6:32 AM History: Altered mental status after fall. Strokelike symptoms. DLP: 744 mGycm. The CT scan of the head is performed without intravenous contrast enhancement. The images are acquired in axial plane and subsequent reconstruction in coronal and sagittal planes. The comparison is made with the previous study dated 8/4/2016. There is no evidence of a mass. There is no midline shift. There is no evidence of intracranial hemorrhage or hematoma. The scattered areas of chronic ischemia are seen in the centrum semiovale bilaterally which are similar to the previous study. The gray-white matter differentiation is maintained. Moderately dilated ventricles, basal cistern and the cortical sulci are similar to the previous study suggesting moderate chronic White loss The images reviewed in bone window show right occipital craniotomy which is similar to the previous study. No acute bony abnormality. There is mucosal thickening involving the ethmoid sinuses. Severe atheromatous changes of the intracranial internal carotid arteries are seen. 1. No acute intracranial abnormality. 2. Chronic ischemic and atrophic changes. 3. Right occipital craniotomy similar to the previous study in August 2016. The above result was called to ER physician, Dr. Jose Miguel Beauchamp. Signed by Dr Rashad Nielsen    Result Date: 12/21/2021  EXAMINATION: XR CHEST PORTABLE 12/21/2021 8:48 AM HISTORY: Stroke symptoms. Report: A portable upright frontal view of the chest was obtained. COMPARISON: Chest x-ray 6/8/2021. The lungs are mildly hypoaerated, no infiltrate is seen. Heart size is normal. No pneumothorax or effusion is identified. The bony thorax and upper abdomen are unremarkable. Impression: Shallow inspiration, no acute findings. Signed by Dr Chaitanya Dan    VL DUP CAROTID BILATERAL    Result Date: 12/22/2021  Vascular Carotid Procedure  Demographics   Patient Name Bee Altman Age                  80   Patient Number  [id number]         Gender               Female   Visit Number    [id number]      Interpreting         Lida Teran                                 Physician   Date of Birth   1940     Referring Physician  Lida Teran   Accession       [id number]     Rafi Espinosa, MICHAEL,  Number                                              RDMS  Procedure Type of Study:   Cerebral:Carotid, VL CAROTID BILATERAL. Indications for Study:CVD. Risk Factors   - The patient's risk factor(s) include: arterial hypertension.   - The patient has a former tobacco history. - The patient's risk factor(s) include: Prior CVA, , Technical Quality:Limited visualization due to patient continually moving. Impression   There is calcific plaque visualized in the bilateral internal carotid  artery(ies). There is less than 50% stenosis in the right internal carotid artery. There is less than 50% stenosis of the left internal carotid artery. There is normal antegrade flow in the bilateral vertebral artery(ies). Signature   ----------------------------------------------------------------  Electronically signed by Jr Hinojosa(Interpreting  physician) on 12/22/2021 04:03 PM  ----------------------------------------------------------------  Blood Pressure:Right arm 172/76 mmHg. Left arm 170/74 mmHg. Velocities are measured in cm/s ; Diameters are measured in mm Carotid Right Measurements +------------+-------+-------+--------+-------+------------+---------------+ ! Location    ! PSV    ! EDV    ! Angle   ! RI     !%Stenosis   ! Tortuosity     ! +------------+-------+-------+--------+-------+------------+---------------+ ! Prox CCA    !67.1   !13.7   !60      !0.8    ! !               ! +------------+-------+-------+--------+-------+------------+---------------+ ! Mid CCA     !66.5   !15.5   !60      !0.77   !            !               ! +------------+-------+-------+--------+-------+------------+---------------+ ! Prox ICA    ! 82     !22.4   !60      !0.73   !            !               ! +------------+-------+-------+--------+-------+------------+---------------+ ! Mid ICA     !57.8   !17.4   !60      !0.7    ! !               ! +------------+-------+-------+--------+-------+------------+---------------+ ! Dist ICA    !78.9   !27.3   ! 60      !0.65   !            !               ! +------------+-------+-------+--------+-------+------------+---------------+ ! Prox ECA    !76.4   !       !60      !       !            !               ! +------------+-------+-------+--------+-------+------------+---------------+ ! Vertebral   !46.6   !8.7    ! 60      !0.81   !            !               ! +------------+-------+-------+--------+-------+------------+---------------+   - There is antegrade vertebral flow noted on the right side. - Additional Measurements:ICAPSV/CCAPSV 1.22. ICAEDV/CCAEDV 1.99. Carotid Left Measurements +------------+-------+-------+--------+-------+------------+---------------+ ! Location    ! PSV    ! EDV    ! Angle   ! RI     !%Stenosis   ! Tortuosity     ! +------------+-------+-------+--------+-------+------------+---------------+ ! Prox CCA    !65.9   !19.3   !60      !0.71   !            !               ! +------------+-------+-------+--------+-------+------------+---------------+ ! Mid CCA     !60.9   !17.4   !60      !0.71   !            !               ! +------------+-------+-------+--------+-------+------------+---------------+ ! Prox ICA    ! 95.9   !14.5   !60      !0.85   !            !               ! +------------+-------+-------+--------+-------+------------+---------------+ ! Mid ICA     ! 99.7   !30     !60      !0.7    ! !               ! +------------+-------+-------+--------+-------+------------+---------------+ ! Dist ICA    !83.1   ! 25.9   !60      !0.69   !            !               ! +------------+-------+-------+--------+-------+------------+---------------+ ! Prox ECA    !150    !       !60      !       !            !               ! +------------+-------+-------+--------+-------+------------+---------------+ ! Vertebral   !46.3   !12.5   !60      !0.73   !            !               ! +------------+-------+-------+--------+-------+------------+---------------+   - There is antegrade vertebral flow noted on the left side. - Additional Measurements:ICAPSV/CCAPSV 1.51. ICAEDV/CCAEDV 1.55. CTA HEAD NECK W CONTRAST    Result Date: 12/21/2021  Examination. CTA HEAD NECK W CONTRAST 12/21/2021 6:37 AM History: Altered mental status after fall. Speech problems. Strokelike symptoms. DLP: 298 mGycm. The CT angiography of the head and neck is performed after intravenous contrast enhancement. The images are acquired in axial plane and subsequent reconstruction in coronal and sagittal planes. There is no previous similar study for comparison. The correlation made with CT scan of the head performed earlier today. Atheromatous changes of the limited visualized aortic arch is noted. Atheromatous changes of the right brachiocephalic, left common carotid and left subclavian arteries are seen. The right brachiocephalic trunk divides into normal-appearing subclavian and common carotid artery. Right common carotid artery: Normal course and caliber frontal the level of bifurcation. There is a large calcific plaque which extends into the carotid bulb. No significant stenosis of the common carotid artery however there is a high-grade, more than 70% stenosis of the proximal carotid bulb. There is subsequent normal course and caliber of the right internal carotid artery. Right external carotid artery: Unremarkable. Left common carotid artery: Moderately tortuous course without a significant stenosis.  There is a large plaque in the distal left common carotid artery extending into the lateral aspect of the base of the left internal carotid artery/carotid bulb. No significant stenosis of the common carotid artery are the carotid bulb. There are a few scattered areas of calcific plaques in the proximal left internal carotid artery without significant stenosis. The remaining left internal carotid arteries normal. Left external carotid artery. There is a large calcific plaque at the origin of the left external carotid artery with approximately 70% focal stenosis. The vertebral arteries: There is normal origin course and caliber of both vertebral arteries, left side being dominant. No areas of focal stenosis or aneurysmal dilatation. A dominant left and relatively small right vertebral artery enter the foramen magnum. CTA HEAD: Normal size internal carotid arteries are seen at the base of skull bilaterally. There are severe atheromatous changes of both internal carotid arteries. Left internal carotid artery: There is approximately 70% stenosis of the distal left internal carotid artery in the distal part of the cavernous sinus proximal to the terminus. A normal-sized left suprasellar internal carotid artery seen dividing into normal-appearing anterior and middle cerebral artery. No areas of focal stenosis or aneurysmal dilatation. Subsequent normal branches. Right internal carotid artery. There is an area of high-grade, more than 70%, stenosis of the right internal carotid artery in the distal part of the cavernous sinus prior to the terminus. Normal size suprasellar internal carotid artery seen which divides into normal-appearing anterior and middle cerebral arteries. The vertebral arteries: A dominant left and a relatively small right vertebral artery enter the foramen magnum and join to make a relatively small basilar artery which divide into very small and attenuated P1 segment of posterior cerebral artery.  There are larger than average posterior communicating arteries bilaterally which join the attenuated P1 segment to make normal size P2 and P3 segments bilaterally. Incidentally noted are multiple low density nodules in both lobes of the thyroid gland, right more than the left. There is mucosal thickening involving the ethmoid maxillary and sphenoid sinuses. 1. Severe atheromatous changes of the common and internal carotid arteries with areas of significant stenosis as detailed above. There is 75% stenosis of the proximal right internal carotid artery. There is high-grade stenosis of the distal intracranial internal carotid arteries bilaterally as detailed above. No evidence of total occlusion. 2. Fetal type posterior circulation. 3. The NASCET criteria was utilized for evaluation/calculation of carotid arterial stenosis. Signed by Dr Gómez Reardon    Result Date: 12/21/2021  EXAMINATION:  MRI BRAIN WO CONTRAST  12/21/2021 1:15 PM HISTORY: Stroke symptoms COMPARISON: Previous brain MR dated 8/5/2016 and CT imaging dated 12/21/2021 TECHNIQUE: Limited MR imaging the brain performed due to patient motion and inability to cooperate. FINDINGS: There is restricted diffusion signal abnormality identified compatible with acute ischemic change/infarction involving the right basal ganglia, particularly the external capsule/subinsular cortex extending superiorly in the anterior lateral putamen region adjacent to the caudate nucleus. There is mild diffusion signal appreciated in the midbrain, positioned in the midline between the cerebral peduncles, suspect artifact. No additional diffusion signal abnormalities observed. Postsurgical changes noted in the right occipital region. There is diffuse central and cortical atrophy. . There is no mass effect or midline shift. 1. Acute right basal ganglia infarct. 2. Mild focal Diffusion signal appreciated in the mid brain, midline between the cerebral peduncles, suspect artifact.  Signed by Dr Jack Brennan              Assessment/Plan   Principal Problem:    TIA (transient ischemic attack)   Fall and seizure precaution   Continue to assess neurologically every 4 hours  Active Problems:    COVID-19   Continuous pulse ox   O2 sat 94 on room air     Essential hypertension   Permissive hypertension at present per neurology    CKD (chronic kidney disease)   In creatinine mildly elevated from yesterday we will monitor closely    Acquired hypothyroidism   Start Synthroid    Blindness of left eye   Note    CVD (cardiovascular disease)    Primary osteoarthritis involving multiple joints    Transient ischemic attack    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 *        DVT Prophylaxis: Eliquis    GI prophylaxis:  proton pump inhibitor per orders    Discharge planning: TBD      Further Orders per Clinical course/attending. Electronically signed by APRYL Minor CNP on 12/22/2021 at 5:04 PM       EMR Dragon/Transcription disclaimer:   Much of this encounter note is an electronic transcription/translation of spoken language to printed text.  The electronic translation of spoken language may permit erroneous, or at times, nonsensical words or phrases to be inadvertently transcribed; although attempts have made to review the note for such errors, some may still exist.

## 2021-12-22 NOTE — PROGRESS NOTES
Pt got up out of bed and walked down the blankenship looking for her buggy to take her home, pt brought back to room and explained that she was in the hospital and needed to press the button for someone to help her so she doesn't fall, bed alarm on, bed in lowest position and locked

## 2021-12-22 NOTE — CARE COORDINATION
Patient Deficits:  [x]   Yes   []   No    If yes:  [x]   Confusion/Memory  []   Visual  []   Motor/Sensory         []   Right arm         []   Right leg         []   Left arm         []   Left leg  []   Language/Speech         []   Aphasia         []   Dysarthria         []   Swallow    NIH Stroke Scale  Interval: Baseline  Level of Consciousness (1a. ): Alert  LOC Questions (1b. ): Answers one correctly  LOC Commands (1c. ): Performs both tasks correctly  Best Gaze (2. ): Normal  Visual (3. ): No visual loss  Facial Palsy (4. ): Normal symmetrical movement  Motor Arm, Left (5a. ): No drift  Motor Arm, Right (5b. ): No drift  Motor Leg, Left (6a. ): No drift  Motor Leg, Right (6b. ): No drift  Limb Ataxia (7. ): Absent  Sensory (8. ): Normal  Best Language (9. ): Mild to moderate aphasia  Dysarthria (10. ): Normal  Extinction and Inattention (11): No abnormality  Total: 2    Romeo Coma Scale  Eye Opening: Spontaneous  Best Verbal Response: Oriented  Best Motor Response: Obeys commands  [place] Coma Scale Score: 15    Patient Deficit Notes:       [x]   Stroke education booklet reviewed and given to patient/family/caregiver/guardian. Patient confused at this time. Booklet placed in patient's room for explanation at a later time.     Patient Admission Status:   Inpatient [101]    Hugo Castano RN  [facility]  Care Management  Electronically signed by Hugo Castano RN on 12/22/2021 at 2:15 PM

## 2021-12-23 PROBLEM — G45.9 TRANSIENT CEREBRAL ISCHEMIC ATTACK: Status: ACTIVE | Noted: 2021-01-01

## 2021-12-23 PROBLEM — S06.5XAA SUBDURAL HEMATOMA (HCC): Status: ACTIVE | Noted: 2021-01-01

## 2021-12-23 PROBLEM — I62.00 SUBDURAL HEMORRHAGE (HCC): Status: ACTIVE | Noted: 2021-01-01

## 2021-12-23 NOTE — PROGRESS NOTES
Patient remains confused and attempting to throw legs over the bed despite bilateral wrist restraints. Soft restraints applied to bilateral ankles to prevent patient from accidentally harming herself. Attempted to educate patient but no evidence of learning at this time.

## 2021-12-23 NOTE — PROGRESS NOTES
Patient received from 435 to 701 after code stroke. Patient is completely unresponsive to voice. With pain stimuli she withdraws her feet and postures with her arms. Pupils are non-reactive. Patient has snoring but unlabored respirations. Family en route. BP is 200s/80s. HR WNL. Notified Dr. Dale Sotelo of arrival to unit.

## 2021-12-23 NOTE — PROGRESS NOTES
Patient found to be  unresponsive. Pt does not respond to her name or sternal rub. Pupils are non responsive. Code stroke called. V/S stable at this time.

## 2021-12-23 NOTE — PROGRESS NOTES
After discussing her condition with Dr. Allyson Wallis, her son and decision maker, would like for the patient to be a DNR. Dr. Chelsey Carvalho notified.

## 2021-12-23 NOTE — PROGRESS NOTES
Called neurology per hospitalist order, Dr. Jamaica Howard on call and was then told to call neurosurgery. Dr. Doyle Dave paged, awaiting for call back.

## 2021-12-23 NOTE — PLAN OF CARE
Problem: Airway Clearance - Ineffective  Goal: Achieve or maintain patent airway  Outcome: Ongoing     Problem: Gas Exchange - Impaired  Goal: Absence of hypoxia  Outcome: Ongoing  Goal: Promote optimal lung function  Outcome: Ongoing     Problem: Breathing Pattern - Ineffective  Goal: Ability to achieve and maintain a regular respiratory rate  Outcome: Ongoing     Problem:  Body Temperature -  Risk of, Imbalanced  Goal: Ability to maintain a body temperature within defined limits  Outcome: Ongoing  Goal: Will regain or maintain usual level of consciousness  Outcome: Ongoing  Goal: Complications related to the disease process, condition or treatment will be avoided or minimized  Outcome: Ongoing     Problem: Isolation Precautions - Risk of Spread of Infection  Goal: Prevent transmission of infection  Outcome: Ongoing     Problem: Nutrition Deficits  Goal: Optimize nutritional status  Outcome: Ongoing     Problem: Risk for Fluid Volume Deficit  Goal: Maintain normal heart rhythm  Outcome: Ongoing  Goal: Maintain absence of muscle cramping  Outcome: Ongoing  Goal: Maintain normal serum potassium, sodium, calcium, phosphorus, and pH  Outcome: Ongoing     Problem: Loneliness or Risk for Loneliness  Goal: Demonstrate positive use of time alone when socialization is not possible  Outcome: Ongoing     Problem: Fatigue  Goal: Verbalize increase energy and improved vitality  Outcome: Ongoing     Problem: Patient Education: Go to Patient Education Activity  Goal: Patient/Family Education  Outcome: Ongoing     Problem: Falls - Risk of:  Goal: Will remain free from falls  Outcome: Ongoing  Goal: Absence of physical injury  Outcome: Ongoing     Problem: Skin Integrity:  Goal: Will show no infection signs and symptoms  Outcome: Ongoing  Goal: Absence of new skin breakdown  Outcome: Ongoing     Problem: Non-Violent Restraints  Goal: Removal from restraints as soon as assessed to be safe  Outcome: Ongoing  Goal: No harm/injury to patient while restraints in use  Outcome: Ongoing  Goal: Patient's dignity will be maintained  Outcome: Ongoing

## 2021-12-23 NOTE — PROGRESS NOTES
4 Eyes Skin Assessment    Candace Baugh is being assessed upon: Transfer to New Unit    I agree that Dipesh Clay RN, along with Igor Aguiar RN (either 2 RN's or 1 LPN and 1 RN) have performed a thorough Head to Toe Skin Assessment on the patient. ALL assessment sites listed below have been assessed. Areas assessed by both nurses:     [x]   Head, Face, and Ears   [x]   Shoulders, Back, and Chest  [x]   Arms, Elbows, and Hands   [x]   Coccyx, Sacrum, and Ischium  [x]   Legs, Feet, and Heels    Does the Patient have Skin Breakdown?  No    Sean Prevention initiated: Yes  Wound Care Orders initiated: NA    [facility] nurse consulted for Pressure Injury (Stage 3,4, Unstageable, DTI, NWPT, and Complex wounds) and New or Established Ostomies: NA        Primary Nurse eSignature: Serena Avelar RN on 12/23/2021 at 5:08 AM      Co-Signer eSignature: Electronically signed by Igor Aguiar RN on 12/23/21 at 5:10 AM CST

## 2021-12-23 NOTE — DISCHARGE SUMMARY
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    Herniation syndrome ([address]  Acute basal ganglia infarct    Essential hypertension    CKD (chronic kidney disease)    Acquired hypothyroidism    CVD (cardiovascular disease)    Primary osteoarthritis involving multiple joints    Blindness of left eye    COVID-19            Significant Diagnostic Studies:   ECHO Complete 2D W Doppler W Color    Result Date: 2021  Transthoracic Echocardiography Report (TTE)  Demographics   Patient Name  Jolie Leija Date of Study          2021   MRN           [id number]         Gender                 Female   Date of Birth 1940     Room Number            [facility]-0435   Age           80 year(s)   Height:       66 inches      Referring Physician    Annalee Vela   Weight:       146 pounds     Sonographer            Laura Chavez RDCS   BSA:          1.75 m^2       Interpreting Physician Demetrius Edwards   BMI:          23.57 kg/m^2  Procedure Type of Study   TTE procedure:ECHO 2D W/DOPPLER/COLOR/CONTRAST. Study Location: Portable Technical Quality: Limited visualization due to poor acoustical window. Patient Status: Inpatient Contrast Medium: Definity. Amount - 6 ml HR: 83 bpm BP: 145/111 mmHg Indications:CVA. Conclusions   Summary  Normal left ventricular size with preserved LV function and an estimated  ejection fraction of approximately 70-75%. Indeterminate diastolic function with evidence for increased LVEDP. Normal right ventricular size with grossly preserved RV function.   No evidence of significant inter-atrial communications by color flow  Doppler or Bubble study (with and without valsalva). Mild calcification of the mitral valve leaflets with normal leaflet  mobility. Mild mitral valve stenosis. No mitral regurgitation. Aortic root and ascending aorta are not well visualized. No evidence of tricuspid regurgitation. Cannot estimate RVSP. No evidence of significant pericardial effusion is noted. The rhythm is sinus. Signature   ----------------------------------------------------------------  Electronically signed by Eric Rodriguez(Interpreting physician)  on 12/21/2021 03:19 PM  ----------------------------------------------------------------   Findings   Mitral Valve  Mild calcification of the mitral valve leaflets with normal leaflet  mobility. Mild mitral valve stenosis. No mitral regurgitation. Aortic Valve  Aortic valve was not well visualized. No significant aortic regurgitation or stenosis is noted. Tricuspid Valve  Tricuspid valve is not well visualized. No evidence of tricuspid regurgitation. Cannot estimate RVSP. Pulmonic Valve  The pulmonic valve was not well visualized. Left Atrium  Normal size left atrium. No evidence of significant inter-atrial communications by color flow  Doppler or Bubble study (with and without valsalva). Left Ventricle  Normal left ventricular size with preserved LV function and an estimated  ejection fraction of approximately 70-75%. Despite the use of IV contrast,  not all segments of the endocardial borders were visualized. No evidence of left ventricular mass or thrombus noted. Indeterminate diastolic function with evidence for increased LVEDP. Right Atrium  Normal right atrial dimension with no evidence of thrombus or mass noted. Right Ventricle  Normal right ventricular size with grossly preserved RV function. Pericardial Effusion  No evidence of significant pericardial effusion is noted. Pleural Effusion  No evidence of pleural effusion.    Miscellaneous  Aortic root and ascending aorta are not well visualized. IVC normal.  The rhythm is sinus. M-Mode Measurements (cm)   LVIDd: 3.36 cm                       LVIDs: 2.29 cm  IVSd: 1.09 cm  LVPWd: 1.16 cm                       AO Root Dimension: 1.9 cm  % Ejection Fraction: 58 %            LA: 2.8 cm                                       LVOT: 1.9 cm  Doppler Measurements:   AV Peak Velocity:123 cm/s           MV Peak E-Wave: 94.1 cm/s  AV Peak Gradient: 6.05 mmHg         MV Peak A-Wave: 136 cm/s                                      MV E/A Ratio: 0.69 %                                      MV Peak Gradient: 3.54 mmHg                                      MV P1/2t: 84 msec                                      MVA by PHT2.62 cm^2      CT HEAD WO CONTRAST    Result Date: 12/21/2021  Examination. CT HEAD WO CONTRAST 12/21/2021 6:32 AM History: Altered mental status after fall. Strokelike symptoms. DLP: 744 mGycm. The CT scan of the head is performed without intravenous contrast enhancement. The images are acquired in axial plane and subsequent reconstruction in coronal and sagittal planes. The comparison is made with the previous study dated 8/4/2016. There is no evidence of a mass. There is no midline shift. There is no evidence of intracranial hemorrhage or hematoma. The scattered areas of chronic ischemia are seen in the centrum semiovale bilaterally which are similar to the previous study. The gray-white matter differentiation is maintained. Moderately dilated ventricles, basal cistern and the cortical sulci are similar to the previous study suggesting moderate chronic White loss The images reviewed in bone window show right occipital craniotomy which is similar to the previous study. No acute bony abnormality. There is mucosal thickening involving the ethmoid sinuses. Severe atheromatous changes of the intracranial internal carotid arteries are seen. 1. No acute intracranial abnormality. 2. Chronic ischemic and atrophic changes.  3. Right occipital craniotomy similar to the previous study in August 2016. The above result was called to ER physician, Dr. Matthew Chisholm. Signed by Dr Ana Lny    Result Date: 12/21/2021  EXAMINATION: XR CHEST PORTABLE 12/21/2021 8:48 AM HISTORY: Stroke symptoms. Report: A portable upright frontal view of the chest was obtained. COMPARISON: Chest x-ray 6/8/2021. The lungs are mildly hypoaerated, no infiltrate is seen. Heart size is normal. No pneumothorax or effusion is identified. The bony thorax and upper abdomen are unremarkable. Impression: Shallow inspiration, no acute findings. Signed by Dr Lance Woody. [place]    VL DUP CAROTID BILATERAL    Result Date: 12/22/2021  Vascular Carotid Procedure  Demographics   Patient Name    Fermin Little Age                  80   Patient Number  [id number]         Gender               Female   Visit Number    [id number]      Interpreting         Abiola Obrien                                 Physician   Date of Birth   1940     Referring Physician  Abiola Obrien   Accession       [id number]     [address], [address],  Number                                              RDMS  Procedure Type of Study:   Cerebral:Carotid, VL CAROTID BILATERAL. Indications for Study:CVD. Risk Factors   - The patient's risk factor(s) include: arterial hypertension.   - The patient has a former tobacco history. - The patient's risk factor(s) include: Prior CVA, , Technical Quality:Limited visualization due to patient continually moving. Impression   There is calcific plaque visualized in the bilateral internal carotid  artery(ies). There is less than 50% stenosis in the right internal carotid artery. There is less than 50% stenosis of the left internal carotid artery. There is normal antegrade flow in the bilateral vertebral artery(ies).    Signature   ----------------------------------------------------------------  Electronically signed by Niko McleodInterpreting physician) on 12/22/2021 04:03 PM  ----------------------------------------------------------------  Blood Pressure:Right arm 172/76 mmHg. Left arm 170/74 mmHg. Velocities are measured in cm/s ; Diameters are measured in mm Carotid Right Measurements +------------+-------+-------+--------+-------+------------+---------------+ ! Location    ! PSV    ! EDV    ! Angle   ! RI     !%Stenosis   ! Tortuosity     ! +------------+-------+-------+--------+-------+------------+---------------+ ! Prox CCA    !67.1   !13.7   !60      !0.8    ! !               ! +------------+-------+-------+--------+-------+------------+---------------+ ! Mid CCA     !66.5   !15.5   !60      !0.77   !            !               ! +------------+-------+-------+--------+-------+------------+---------------+ ! Prox ICA    ! 82     !22.4   !60      !0.73   !            !               ! +------------+-------+-------+--------+-------+------------+---------------+ ! Mid ICA     !57.8   !17.4   !60      !0.7    ! !               ! +------------+-------+-------+--------+-------+------------+---------------+ ! Dist ICA    !78.9   !27.3   ! 60      !0.65   !            !               ! +------------+-------+-------+--------+-------+------------+---------------+ ! Prox ECA    !76.4   !       !60      !       !            !               ! +------------+-------+-------+--------+-------+------------+---------------+ ! Vertebral   !46.6   !8.7    ! 60      !0.81   !            !               ! +------------+-------+-------+--------+-------+------------+---------------+   - There is antegrade vertebral flow noted on the right side. - Additional Measurements:ICAPSV/CCAPSV 1.22. ICAEDV/CCAEDV 1.99. Carotid Left Measurements +------------+-------+-------+--------+-------+------------+---------------+ ! Location    ! PSV    ! EDV    ! Angle   ! RI     !%Stenosis   ! Tortuosity     !  +------------+-------+-------+--------+-------+------------+---------------+ !Prox CCA    !65.9   !19.3   !60      !0.71   !            !               ! +------------+-------+-------+--------+-------+------------+---------------+ ! Mid CCA     !60.9   !17.4   !60      !0.71   !            !               ! +------------+-------+-------+--------+-------+------------+---------------+ ! Prox ICA    ! 95.9   !14.5   !60      !0.85   !            !               ! +------------+-------+-------+--------+-------+------------+---------------+ ! Mid ICA     ! 99.7   !30     !60      !0.7    ! !               ! +------------+-------+-------+--------+-------+------------+---------------+ ! Dist ICA    !83.1   ! 25.9   !60      !0.69   !            !               ! +------------+-------+-------+--------+-------+------------+---------------+ ! Prox ECA    !150    !       !60      !       !            !               ! +------------+-------+-------+--------+-------+------------+---------------+ ! Vertebral   !46.3   !12.5   !60      !0.73   !            !               ! +------------+-------+-------+--------+-------+------------+---------------+   - There is antegrade vertebral flow noted on the left side. - Additional Measurements:ICAPSV/CCAPSV 1.51. ICAEDV/CCAEDV 1.55. CTA HEAD NECK W CONTRAST    Result Date: 12/21/2021  Examination. CTA HEAD NECK W CONTRAST 12/21/2021 6:37 AM History: Altered mental status after fall. Speech problems. Strokelike symptoms. DLP: 298 mGycm. The CT angiography of the head and neck is performed after intravenous contrast enhancement. The images are acquired in axial plane and subsequent reconstruction in coronal and sagittal planes. There is no previous similar study for comparison. The correlation made with CT scan of the head performed earlier today. Atheromatous changes of the limited visualized aortic arch is noted. Atheromatous changes of the right brachiocephalic, left common carotid and left subclavian arteries are seen.  The right brachiocephalic trunk divides into normal-appearing subclavian and common carotid artery. Right common carotid artery: Normal course and caliber frontal the level of bifurcation. There is a large calcific plaque which extends into the carotid bulb. No significant stenosis of the common carotid artery however there is a high-grade, more than 70% stenosis of the proximal carotid bulb. There is subsequent normal course and caliber of the right internal carotid artery. Right external carotid artery: Unremarkable. Left common carotid artery: Moderately tortuous course without a significant stenosis. There is a large plaque in the distal left common carotid artery extending into the lateral aspect of the base of the left internal carotid artery/carotid bulb. No significant stenosis of the common carotid artery are the carotid bulb. There are a few scattered areas of calcific plaques in the proximal left internal carotid artery without significant stenosis. The remaining left internal carotid arteries normal. Left external carotid artery. There is a large calcific plaque at the origin of the left external carotid artery with approximately 70% focal stenosis. The vertebral arteries: There is normal origin course and caliber of both vertebral arteries, left side being dominant. No areas of focal stenosis or aneurysmal dilatation. A dominant left and relatively small right vertebral artery enter the foramen magnum. CTA HEAD: Normal size internal carotid arteries are seen at the base of skull bilaterally. There are severe atheromatous changes of both internal carotid arteries. Left internal carotid artery: There is approximately 70% stenosis of the distal left internal carotid artery in the distal part of the cavernous sinus proximal to the terminus. A normal-sized left suprasellar internal carotid artery seen dividing into normal-appearing anterior and middle cerebral artery. No areas of focal stenosis or aneurysmal dilatation.  Subsequent normal branches. Right internal carotid artery. There is an area of high-grade, more than 70%, stenosis of the right internal carotid artery in the distal part of the cavernous sinus prior to the terminus. Normal size suprasellar internal carotid artery seen which divides into normal-appearing anterior and middle cerebral arteries. The vertebral arteries: A dominant left and a relatively small right vertebral artery enter the foramen magnum and join to make a relatively small basilar artery which divide into very small and attenuated P1 segment of posterior cerebral artery. There are larger than average posterior communicating arteries bilaterally which join the attenuated P1 segment to make normal size P2 and P3 segments bilaterally. Incidentally noted are multiple low density nodules in both lobes of the thyroid gland, right more than the left. There is mucosal thickening involving the ethmoid maxillary and sphenoid sinuses. 1. Severe atheromatous changes of the common and internal carotid arteries with areas of significant stenosis as detailed above. There is 75% stenosis of the proximal right internal carotid artery. There is high-grade stenosis of the distal intracranial internal carotid arteries bilaterally as detailed above. No evidence of total occlusion. 2. Fetal type posterior circulation. 3. The NASCET criteria was utilized for evaluation/calculation of carotid arterial stenosis. Signed by Dr Ceballos Going    Result Date: 12/21/2021  EXAMINATION:  MRI BRAIN WO CONTRAST  12/21/2021 1:15 PM HISTORY: Stroke symptoms COMPARISON: Previous brain MR dated 8/5/2016 and CT imaging dated 12/21/2021 TECHNIQUE: Limited MR imaging the brain performed due to patient motion and inability to cooperate.  FINDINGS: There is restricted diffusion signal abnormality identified compatible with acute ischemic change/infarction involving the right basal ganglia, particularly the external capsule/subinsular cortex extending superiorly in the anterior lateral putamen region adjacent to the caudate nucleus. There is mild diffusion signal appreciated in the midbrain, positioned in the midline between the cerebral peduncles, suspect artifact. No additional diffusion signal abnormalities observed. Postsurgical changes noted in the right occipital region. There is diffuse central and cortical atrophy. . There is no mass effect or midline shift. 1. Acute right basal ganglia infarct. 2. Mild focal Diffusion signal appreciated in the mid brain, midline between the cerebral peduncles, suspect artifact. Signed by Dr Hoa Jesus:   CBC:   Recent Labs     12/21/21  0613 12/22/21  0636 12/23/21  0324   WBC 4.3* 1.9* 9.6   HGB 11.0* 10.9* 11.1*   * 112* 140     BMP:    Recent Labs     12/21/21  0613 12/22/21  0636 12/23/21  0324   * 138 138   K 4.4 4.9 4.7    104 104   CO2 18* 21* 18*   BUN 35* 47* 59*   CREATININE 2.0* 2.1* 2.6*   GLUCOSE 103 148* 231*     INR:   Recent Labs     12/21/21  1317 12/23/21  0323   INR 1.14 1.56*           Hospital Course: 63-year-old female presented after a fall at home with head trauma. History significant for previous stroke with residual left eye blindness and previous craniotomy. Tested positive for COVID-19 few days prior, unvaccinated. Initial work-up with CT head showed chronic ischemic and atrophic changes with right occipital craniotomy. Severe intracranial stenosis involving right ICA with 75 proximal stenosis noted on CTA head/neck, so vascular surgery consulted. MRI brain demonstrated acute right basal ganglia infarct. Also evaluated by neurology recommending antiplatelet therapy and statin. Given acute stroke with COVID-19 coagulopathy, neurology and vascular felt anticoagulation would be beneficial to transition to Eliquis. No other intervention recommended per vascular.  Patient then developed a significant decline in mental status evening of 12/22 becoming completely unresponsive. Stat CT head performed revealed development of massive right hemispheric subdural hematoma with herniation. Evaluated by neurosurgery who noted patient deeply comatose with no eye-opening, no verbal response with GCS of 4. Neurosurgery discussed case with patient's family and discussed while it was possible to evacuate subdural hematoma felt neurologic state would not recover despite this to any meaningful degree. After discussion with palliative care and hospice, patient son (primary decision maker) agreed with transition to hospice patient CODE STATUS DNR. Patient will be discharged with readmission to inpatient hospice. Physical Exam:  Vital Signs: BP (!) 190/82   Pulse 82   Temp 98.4 °F (36.9 °C) (Temporal)   Resp 15   Ht 5' 6\" (1.676 m)   Wt 136 lb (61.7 kg)   SpO2 95%   BMI 21.95 kg/m²   General appearance:.Ill-appearing, unresponsive  HEENT: Normal conjunctiva, pupils nonreactive  Chest: No intercostal retractions or use of accessory muscles  Cardiac: Normal rate, regular rhythm  Abdomen: Nondistended  Extremities: No clubbing or cyanosis. Neurologic:  No response to verbal stimuli        Disposition: Patient will be discharged with readmission to inpatient hospice. Time spent on discharge 35 minutes.      Signed:  True Gore MD  12/23/2021 10:20 AM

## 2021-12-23 NOTE — PROGRESS NOTES
Physical Therapy    Pt unable to participate in therapy at this time, likely transition to hospice per MD notes. Will d/c order. Please reconsult if status changes.     Electronically signed by Dari Blackwell PT on 12/23/2021 at 9:43 AM

## 2021-12-23 NOTE — CONSULTS
[id number] [facility] Neurosurgery Consultation        REASON FOR CONSULTATION:  Subdural hematoma      HISTORY OF PRESENT ILLNESS:      The patient is a 80 y.o. female who was admitted for altered mental status and speech difficulty on 12/21/2021. She was found to be COVID positive and subsequent workup revealed right a basal ganglia ischemic infarct. She had no evidence of intracranial hemorrhage on her admission imaging. She was admitted to the hospital and transitioned from 81mg ASA to Eliquis due to significant intracranial carotid atherosclerosis. She became increasingly altered the evening of 12/22 and was ultimately found unresponsive in her room at 0300 on 12/23. A code stroke was called and she was taken for emergent head CT and this revealed a massive right-sided acute subdural hematoma with herniation and I have been asked to provide neurosurgical consultation. She has been transferred to the CCU and is currently obtunded but has not been intubated. She will not respond to any verbal stimuli and is posturing. 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 now feels like her Sphinctor muscle is very tight.     COLONOSCOPY  2005    CRANIAL LESION RESECTION  1981    blood clot    EYE SURGERY Left 02/02/2017    FEMUR FRACTURE SURGERY Left 4/25/2019    INTRA TROCAR SHORT TFN performed by Magdalene Trevizo MD at [address] Right 2011    [address] [facility] [address]. EAR SURGERY Right 1968    EARDRUM [address] [facility].  IN [facility]    OTHER SURGICAL HISTORY      TUBES TIED AT AGE 38    CO EGD TRANSORAL BIOPSY SINGLE/MULTIPLE N/A 3/7/2017    Dr Shannan Delgado-Gastritis   Nicolasa Naegeli GASTROINTESTINAL ENDOSCOPY  03/07/2017         Current Facility-Administered Medications:     hydrALAZINE (APRESOLINE) injection 10 mg, 10 mg, IntraVENous, Q4H PRN, Peewee De León MD    prothrombin complex concentrate (human) (KCENTRA) infusion 2,000 Units, 2,000 Units, IntraVENous, Once, Peewee De León MD    0.9 % sodium chloride infusion, 50 mL, IntraVENous, Once, Peewee De León MD    losartan (COZAAR) tablet 100 mg, 100 mg, Oral, Daily, Peewee De León MD, 100 mg at 12/22/21 1044    metoprolol tartrate (LOPRESSOR) tablet 100 mg, 100 mg, Oral, BID, Peewee De León MD, 100 mg at 12/22/21 2148    amLODIPine (NORVASC) tablet 10 mg, 10 mg, Oral, Daily, Peewee De León MD    doxazosin (CARDURA) tablet 2 mg, 2 mg, Oral, Daily, Peewee De León MD, 2 mg at 12/22/21 1044    apixaban (ELIQUIS) tablet 5 mg, 5 mg, Oral, BID, That{img} Hikes, DO, 5 mg at 12/22/21 2153    atorvastatin (LIPITOR) tablet 20 mg, 20 mg, Oral, Nightly, That{img} Hikes, DO, 20 mg at 12/22/21 2148    levothyroxine (SYNTHROID) tablet 50 mcg, 50 mcg, Oral, Daily, APRYL Guerrero - CNP    hydroCHLOROthiazide (HYDRODIURIL) tablet 25 mg, 25 mg, Oral, Daily, Josue Valentine MD    citalopram (CELEXA) tablet 20 mg, 20 mg, Oral, Daily, Peewee De León MD, 20 mg at 12/22/21 2148    oxyCODONE-acetaminophen (PERCOCET) 5-325 MG per tablet 1 tablet, 1 tablet, Oral, Q8H PRN, Peewee De León MD    haloperidol lactate (HALDOL) injection 1 mg, 1 mg, IntraMUSCular, Q4H PRN, Peewee De León MD, 1 mg at 12/22/21 2339    melatonin disintegrating tablet 5 mg, 5 mg, Oral, Nightly, Jos Sanchez MD    sodium chloride flush 0.9 % injection 5-40 mL, 5-40 mL, IntraVENous, 2 times per day, APRYL Wheeler CNP, 10 mL at 12/22/21 2150    sodium chloride flush 0.9 % injection 5-40 mL, 5-40 mL, IntraVENous, PRN, APRYL Wheeler CNP    0.9 % sodium chloride infusion, 25 mL, IntraVENous, PRN, APRYL Wheeler CNP    potassium chloride 10 mEq/100 mL IVPB (Peripheral Line), 10 mEq, IntraVENous, PRN, APRYL Wheeler CNP    magnesium sulfate 2000 mg in 50 mL IVPB premix, 2,000 mg, IntraVENous, PRN, APRYL Wheeler CNP    polyethylene glycol (GLYCOLAX) packet 17 g, 17 g, Oral, Daily PRN, APRYL Wheeler CNP    acetaminophen (TYLENOL) tablet 650 mg, 650 mg, Oral, Q6H PRN **OR** acetaminophen (TYLENOL) suppository 650 mg, 650 mg, Rectal, Q6H PRN, APRYL Wheeler CNP    ondansetron (ZOFRAN-ODT) disintegrating tablet 4 mg, 4 mg, Oral, Q8H PRN **OR** ondansetron (ZOFRAN) injection 4 mg, 4 mg, IntraVENous, Q6H PRN, APRYL Wheeler CNP    famotidine (PEPCID) injection 10 mg, 10 mg, IntraVENous, BID, APRYL Wheeler CNP, 10 mg at 12/22/21 2149    ipratropium (ATROVENT HFA) 17 MCG/ACT inhaler 2 puff, 2 puff, Inhalation, 4x daily, APRYL Wheeler CNP, 2 puff at 12/22/21 2150    budesonide-formoterol (SYMBICORT) 160-4.5 MCG/ACT inhaler 2 puff, 2 puff, Inhalation, BID, APRYL Wheeler CNP, 2 puff at 12/22/21 2150    Vitamin D (CHOLECALCIFEROL) tablet 2,000 Units, 2,000 Units, Oral, Daily, APRYL Wheeler - CNP, 2,000 Units at 12/22/21 1044    ascorbic acid (VITAMIN C) tablet 500 mg, 500 mg, Oral, Daily, Jay Kat APRALYSSA - CNP, 500 mg at 12/22/21 1044    zinc sulfate (ZINCATE) capsule 50 mg, 50 mg, Oral, Daily, Jay Newcastle APRALYSSA - CNP, 50 mg at 12/22/21 1044    dexamethasone (DECADRON) tablet 6 mg, 6 mg, Oral, Daily, Juliette Dueñas CrawfordAPRYL - CNP, 6 mg at 21 1044    Allergies:  Actonel  [risedronate sodium], Ativan [lorazepam], Clarithromycin, Clonidine, Fosamax  [alendronate sodium], Gabapentin, Lisinopril, Meloxicam, Nsaids, and Phenergan [promethazine]    Social History:   Social History     Tobacco Use   Smoking Status Former Smoker    Packs/day: 1.00    Years: 12.00    Pack years: 12.00    Quit date: 1960    Years since quittin.6   Smokeless Tobacco Never Used     Social History     Substance and Sexual Activity   Alcohol Use No         Family History:   Family History   Problem Relation Age of Onset    Diabetes Mother     Stroke Mother     Diabetes Sister     Stroke Sister     Diabetes Brother     Stroke Brother     Colon Cancer Neg Hx     Colon Polyps Neg Hx     Esophageal Cancer Neg Hx     Liver Cancer Neg Hx     Liver Disease Neg Hx     Stomach Cancer Neg Hx     Rectal Cancer Neg Hx        REVIEW OF SYSTEMS:  Review of Systems   Unable to perform ROS: Mental status change       PHYSICAL EXAM:    Vitals:    21 0500   BP: (!) 203/85   Pulse: 89   Resp: 17   Temp:    SpO2: 95%       Constitutional: Ill-appearing, unresponsive. Eyes - conjunctiva normal.  R pupil 5mm, L pupil 3mm, nonreactive bilaterally  Ear, nose,throat -Normal pinna and tragus, No scars, or lesions over external nose or ears, no obvious atrophy of tongue  Neck- symmetric, trachea midline, no jugular vein distension, no thyromegaly  Respiration- chest wall symmetric, shallow respirations  Musculoskeletal - no significant wasting of muscles noted, no bony deformities, symmetric bulk  Extremities- no clubbing, cyanosis or edema  Skin - warm, dry, and intact. No rash,erythema, or pallor. Psychiatric - unable to assess. NEUROLOGIC EXAM:    MENTAL STATUS: No response to verbal stimuli, no eye opening to voice or pain    CRANIAL NERVES: R pupil 5mm, L pupil 3mm, nonreactive bilaterally.     VISUAL FIELDS: Unable to assess    MOTOR:     Right Upper Extremity:    Extensor posturing to central and peripheral noxious stimuli    Left Upper Extremity:    Extensor posturing to central and peripheral noxious stimuli    Right Lower Extremity:    Triple flexion to central and peripheral noxious stimuli      Left Lower Extremity:    Triple flexion to central and peripheral noxious stimuli      SOMATOSENSORY:     Extensor posturing to central and peripheral noxious stimuli BUE  Triple flexion to central and peripheral noxious stimuli BLE            DATA:    Weight: 136 lb (61.7 kg), BP: (!) 203/85      Lab Results   Component Value Date    WBC 9.6 12/23/2021    HGB 11.1 (L) 12/23/2021    HCT 34.4 (L) 12/23/2021    MCV 89.4 12/23/2021     12/23/2021     Lab Results   Component Value Date     12/23/2021    K 4.7 12/23/2021     12/23/2021    CO2 18 (L) 12/23/2021    BUN 59 (H) 12/23/2021    CREATININE 2.6 (H) 12/23/2021    GLUCOSE 231 (H) 12/23/2021    CALCIUM 8.2 (L) 12/23/2021    PROT 6.4 (L) 12/21/2021    LABALBU 3.5 12/21/2021    BILITOT 0.7 12/21/2021    ALKPHOS 52 12/21/2021    AST 22 12/21/2021    ALT 13 12/21/2021    LABGLOM 18 (A) 12/23/2021    GFRAA 21 (L) 12/23/2021    GLOB 2.8 02/08/2017     Lab Results   Component Value Date    INR 1.56 (H) 12/23/2021    INR 1.14 12/21/2021    INR 1.05 04/24/2019    PROTIME 18.7 (H) 12/23/2021    PROTIME 14.8 (H) 12/21/2021    PROTIME 13.1 04/24/2019       No results found. IMAGING:    My interpretation of imaging studies:  Massive acute right subdural hematoma, ~2.5cm in thickness causing >2cm of midline shift.   There is evidence of both uncal and transtentorial herniation with cisternal effacement and trapping of the temporal horn of the left lateral ventricle            ASSESSMENT AND PLAN:  This is a 80 y.o. female who is admitted for a right basal ganglia ischemic infarct, incidentally COVID-positive, who developed a decline in mental status the evening of 12/22 to the point that she became completely unresponsive and a code stroke was called. A STAT head CT was performed that reveals a massive right hemispheric subdural hematoma and herniation. She is deeply comatose (no eye-opening, no verbal response, extensor posturing) with a GCS of 4. I explained the situation to her son and stated that this is a devastating intracranial hemorrhage. I explained that surgery was possible to evacuate the subdural hematoma, but given her current neurologic state I did not feel this would be likely to restore her to any meaningful quality of life. I discussed a possible transition to palliative/hospice care as an alternative. Her son agreed and she has been made DNR.         Una Scruggs MD

## 2021-12-23 NOTE — PROGRESS NOTES
Patient very confused this evening, bed alarm constantly alarming. Patient alert to self only and extremely unsteady on her feet. Attempted to reorient patient several times without success. Orders received at 2200 for ativan, but medication appeared to have caused the patient to become agitated. Notified Dr. Angeline Champagne and new orders received. Assisted patient back to bed several more times. Patient remains very unsteady on her feet. Bilateral wrists applied to help keep patient in bed and prevent harm to herself.

## 2021-12-23 NOTE — PROGRESS NOTES
The pts son signed the adm forms admitting the pt to hospice. The pt will remain in CCU due to covid positive status. A hospice nurse will follow to complete assessments. Emotional and sc support provided.

## 2021-12-24 NOTE — PROGRESS NOTES
Physician Progress Note      PATIENT:               Halima Us  CSN #:                  [id number]  :                       1940  ADMIT DATE:       2021 6:07 AM  DISCH DATE:        2021 10:30 AM  RESPONDING  PROVIDER #:        Tamie Carnes          QUERY TEXT:    Patient admitted with altered mental status. Noted documentation of \"MRI   with right basal ganglia stroke\" in cumulative hospital course of pnotes on   21 and \"TIA\" listed as principal problem in same progress notes. If possible, please document in progress notes and discharge summary if you   are evaluating and /or treating any of the following: The medical record reflects the following:  Risk Factors: cardiovascular disease, hypertension, previous stroke, carotid   stenosis  Clinical Indicators: presented to ED with a fall, altered mental status, and   slurred speech; MRI brain-acute right basal ganglia infarct, carotid us-less   than 50% stenosis of right internal carotid artery and left internal carotid   artery  Treatment: MRI brain, Vascular consult, antiplatelet therapy, Neuro consult    Thank you,  Sadia Rebolledo, CCDS  [phone]  Options provided:  -- Stroke confirmed and TIA ruled out  -- TIA confirmed and stroke ruled out  -- Other - I will add my own diagnosis  -- Disagree - Not applicable / Not valid  -- Disagree - Clinically unable to determine / Unknown  -- Refer to Clinical Documentation Reviewer    PROVIDER RESPONSE TEXT:    After study, stroke confirmed and TIA ruled out.     Query created by: Zenon Pena on 2021 10:39 AM      Electronically signed by:  Tamie Carnes 2021 7:39 PM

## (undated) DEVICE — DRAPE,U/ SHT,SPLIT,PLAS,STERIL: Brand: MEDLINE

## (undated) DEVICE — GLOVE SURG SZ 9 L12IN FNGR THK13MIL BRN LTX SYN POLYMER W

## (undated) DEVICE — INTENDED FOR TISSUE SEPARATION, AND OTHER PROCEDURES THAT REQUIRE A SHARP SURGICAL BLADE TO PUNCTURE OR CUT.: Brand: BARD-PARKER ® STAINLESS STEEL BLADES

## (undated) DEVICE — WRAP AROUND LENS-STERLIE

## (undated) DEVICE — C-ARMOR C-ARM EQUIPMENT COVERS CLEAR STERILE UNIVERSAL FIT 12 PER CASE: Brand: C-ARMOR

## (undated) DEVICE — I-KNIFE II CUTTING INSTRUMENT 3 MM: Brand: I-KNIFE

## (undated) DEVICE — SUT VIC 6/0 S24 12IN J552G

## (undated) DEVICE — SHEET,DRAPE,53X77,STERILE: Brand: MEDLINE

## (undated) DEVICE — RUBBER BANDS 2/PK: Brand: CARDINAL HEALTH

## (undated) DEVICE — Z DISCONTINUED USE 2429233 DRESSING FOAM W10XL10CM 5 LAYR SELF ADH VERSATILE SAFETAC

## (undated) DEVICE — BLD BEAVR MINI LAMELAR 60D SMTH BVL UP

## (undated) DEVICE — SHARP TIP OPHTHALMIC SPONGE: Brand: MICROSPONGE

## (undated) DEVICE — 6617 IOBAN II PATIENT ISOLATION DRAPE 5/BX,4BX/CS: Brand: STERI-DRAPE™ IOBAN™ 2

## (undated) DEVICE — SPNG GZ STRL 2S 4X4 12PLY

## (undated) DEVICE — SUTURE - NEEDLE TYPE C-3,BLACK MONOFILAMENT NYLON(BMN), SUTURE SIZE 10-0, SUTURE LENGTH 12,DOUBLE ARMED: Brand: A.C.S® (ALCON CLOSURE SYSTEM)

## (undated) DEVICE — SYR TB PRECISIONGLIDE 1CC 26G 3/8IN LF

## (undated) DEVICE — SOL BSS 30ML

## (undated) DEVICE — PK VITRECT 30

## (undated) DEVICE — SUT SILK 4/0 RB1 24IN K571H

## (undated) DEVICE — C-ARM: Brand: UNBRANDED

## (undated) DEVICE — SOLUTION IV IRRIG POUR BRL 0.9% SODIUM CHL 2F7124

## (undated) DEVICE — SURGICAL PROCEDURE PACK LOWER EXTREMITY LOURDES HOSP

## (undated) DEVICE — GLOVE SURG SZ 85 L12IN FNGR THK94MIL TRNSLUC YEL LTX

## (undated) DEVICE — SUTURE VCRL SZ 3-0 L27IN ABSRB UD L26MM SH 1/2 CIR J416H

## (undated) DEVICE — GUIDEWIRE ORTH L400MM DIA3.2MM FOR TFN

## (undated) DEVICE — CONN FLX BREATHE CIRCT

## (undated) DEVICE — BIT DRL L330MM DIA4.2MM CALIB 100MM 3 FLUT QUIK CPL

## (undated) DEVICE — GLV SURG BIOGEL M LTX PF 7 1/2

## (undated) DEVICE — DRAPE,EYE,BODY,INC PCH,STERILE: Brand: MEDLINE

## (undated) DEVICE — SUTURE VCRL SZ 2-0 L36IN ABSRB UD L36MM CT-1 1/2 CIR J945H